# Patient Record
Sex: FEMALE | Race: WHITE | Employment: UNEMPLOYED | URBAN - METROPOLITAN AREA
[De-identification: names, ages, dates, MRNs, and addresses within clinical notes are randomized per-mention and may not be internally consistent; named-entity substitution may affect disease eponyms.]

---

## 2017-08-18 PROBLEM — Z34.83 NORMAL PREGNANCY IN MULTIGRAVIDA IN THIRD TRIMESTER: Status: ACTIVE | Noted: 2017-08-18

## 2021-08-10 ENCOUNTER — HOSPITAL ENCOUNTER (EMERGENCY)
Age: 26
Discharge: OTHER INSTITUTION | End: 2021-08-11
Attending: STUDENT IN AN ORGANIZED HEALTH CARE EDUCATION/TRAINING PROGRAM
Payer: MEDICARE

## 2021-08-10 DIAGNOSIS — R45.851 SUICIDAL IDEATION: ICD-10-CM

## 2021-08-10 DIAGNOSIS — N39.0 URINARY TRACT INFECTION WITHOUT HEMATURIA, SITE UNSPECIFIED: Primary | ICD-10-CM

## 2021-08-10 LAB
ACETAMINOPHEN LEVEL: <5 UG/ML (ref 15–30)
ALBUMIN SERPL-MCNC: 4.5 GM/DL (ref 3.4–5)
ALCOHOL SCREEN SERUM: <0.01 %WT/VOL
ALP BLD-CCNC: 80 IU/L (ref 40–129)
ALT SERPL-CCNC: 17 U/L (ref 10–40)
AMPHETAMINES: NEGATIVE
ANION GAP SERPL CALCULATED.3IONS-SCNC: 9 MMOL/L (ref 4–16)
AST SERPL-CCNC: 12 IU/L (ref 15–37)
BACTERIA: NEGATIVE /HPF
BARBITURATE SCREEN URINE: NEGATIVE
BASOPHILS ABSOLUTE: 0.1 K/CU MM
BASOPHILS RELATIVE PERCENT: 0.5 % (ref 0–1)
BENZODIAZEPINE SCREEN, URINE: NEGATIVE
BILIRUB SERPL-MCNC: 0.2 MG/DL (ref 0–1)
BILIRUBIN URINE: NEGATIVE MG/DL
BLOOD, URINE: NEGATIVE
BUN BLDV-MCNC: 8 MG/DL (ref 6–23)
CALCIUM SERPL-MCNC: 9.1 MG/DL (ref 8.3–10.6)
CANNABINOID SCREEN URINE: NEGATIVE
CHLORIDE BLD-SCNC: 103 MMOL/L (ref 99–110)
CLARITY: ABNORMAL
CO2: 28 MMOL/L (ref 21–32)
COCAINE METABOLITE: NEGATIVE
COLOR: YELLOW
CREAT SERPL-MCNC: 0.6 MG/DL (ref 0.6–1.1)
DIFFERENTIAL TYPE: ABNORMAL
DOSE AMOUNT: ABNORMAL
DOSE AMOUNT: ABNORMAL
DOSE TIME: ABNORMAL
DOSE TIME: ABNORMAL
EOSINOPHILS ABSOLUTE: 0.3 K/CU MM
EOSINOPHILS RELATIVE PERCENT: 3.5 % (ref 0–3)
GFR AFRICAN AMERICAN: >60 ML/MIN/1.73M2
GFR NON-AFRICAN AMERICAN: >60 ML/MIN/1.73M2
GLUCOSE BLD-MCNC: 87 MG/DL (ref 70–99)
GLUCOSE, URINE: NEGATIVE MG/DL
HCG QUALITATIVE: NEGATIVE
HCT VFR BLD CALC: 41.6 % (ref 37–47)
HEMOGLOBIN: 13.3 GM/DL (ref 12.5–16)
IMMATURE NEUTROPHIL %: 0.2 % (ref 0–0.43)
KETONES, URINE: NEGATIVE MG/DL
LEUKOCYTE ESTERASE, URINE: ABNORMAL
LYMPHOCYTES ABSOLUTE: 2.3 K/CU MM
LYMPHOCYTES RELATIVE PERCENT: 24.9 % (ref 24–44)
MCH RBC QN AUTO: 31.1 PG (ref 27–31)
MCHC RBC AUTO-ENTMCNC: 32 % (ref 32–36)
MCV RBC AUTO: 97.2 FL (ref 78–100)
MONOCYTES ABSOLUTE: 0.7 K/CU MM
MONOCYTES RELATIVE PERCENT: 7.3 % (ref 0–4)
MUCUS: ABNORMAL HPF
NITRITE URINE, QUANTITATIVE: NEGATIVE
NUCLEATED RBC %: 0 %
OPIATES, URINE: NEGATIVE
OXYCODONE: NEGATIVE
PDW BLD-RTO: 13 % (ref 11.7–14.9)
PH, URINE: 7 (ref 5–8)
PHENCYCLIDINE, URINE: NEGATIVE
PLATELET # BLD: 256 K/CU MM (ref 140–440)
PMV BLD AUTO: 10.6 FL (ref 7.5–11.1)
POTASSIUM SERPL-SCNC: 4.2 MMOL/L (ref 3.5–5.1)
PROTEIN UA: NEGATIVE MG/DL
RBC # BLD: 4.28 M/CU MM (ref 4.2–5.4)
RBC URINE: 9 /HPF (ref 0–6)
SALICYLATE LEVEL: <0.3 MG/DL (ref 15–30)
SARS-COV-2, NAAT: NOT DETECTED
SEGMENTED NEUTROPHILS ABSOLUTE COUNT: 5.9 K/CU MM
SEGMENTED NEUTROPHILS RELATIVE PERCENT: 63.6 % (ref 36–66)
SODIUM BLD-SCNC: 140 MMOL/L (ref 135–145)
SOURCE: NORMAL
SPECIFIC GRAVITY UA: 1.02 (ref 1–1.03)
SQUAMOUS EPITHELIAL: 10 /HPF
TOTAL IMMATURE NEUTOROPHIL: 0.02 K/CU MM
TOTAL NUCLEATED RBC: 0 K/CU MM
TOTAL PROTEIN: 7.3 GM/DL (ref 6.4–8.2)
TRICHOMONAS: ABNORMAL /HPF
UROBILINOGEN, URINE: 2 MG/DL (ref 0.2–1)
WBC # BLD: 9.3 K/CU MM (ref 4–10.5)
WBC UA: 23 /HPF (ref 0–5)
YEAST: ABNORMAL /HPF

## 2021-08-10 PROCEDURE — 80053 COMPREHEN METABOLIC PANEL: CPT

## 2021-08-10 PROCEDURE — 6370000000 HC RX 637 (ALT 250 FOR IP): Performed by: STUDENT IN AN ORGANIZED HEALTH CARE EDUCATION/TRAINING PROGRAM

## 2021-08-10 PROCEDURE — 84703 CHORIONIC GONADOTROPIN ASSAY: CPT

## 2021-08-10 PROCEDURE — 99285 EMERGENCY DEPT VISIT HI MDM: CPT

## 2021-08-10 PROCEDURE — 84443 ASSAY THYROID STIM HORMONE: CPT

## 2021-08-10 PROCEDURE — 85025 COMPLETE CBC W/AUTO DIFF WBC: CPT

## 2021-08-10 PROCEDURE — 87635 SARS-COV-2 COVID-19 AMP PRB: CPT

## 2021-08-10 PROCEDURE — 81001 URINALYSIS AUTO W/SCOPE: CPT

## 2021-08-10 PROCEDURE — 83721 ASSAY OF BLOOD LIPOPROTEIN: CPT

## 2021-08-10 PROCEDURE — 80061 LIPID PANEL: CPT

## 2021-08-10 PROCEDURE — 80307 DRUG TEST PRSMV CHEM ANLYZR: CPT

## 2021-08-10 PROCEDURE — G0480 DRUG TEST DEF 1-7 CLASSES: HCPCS

## 2021-08-10 PROCEDURE — 6370000000 HC RX 637 (ALT 250 FOR IP): Performed by: PHYSICIAN ASSISTANT

## 2021-08-10 RX ORDER — NICOTINE 21 MG/24HR
1 PATCH, TRANSDERMAL 24 HOURS TRANSDERMAL DAILY
Status: DISCONTINUED | OUTPATIENT
Start: 2021-08-10 | End: 2021-08-11 | Stop reason: HOSPADM

## 2021-08-10 RX ORDER — HYDROXYZINE PAMOATE 25 MG/1
25 CAPSULE ORAL ONCE
Status: COMPLETED | OUTPATIENT
Start: 2021-08-10 | End: 2021-08-10

## 2021-08-10 RX ORDER — NITROFURANTOIN 25; 75 MG/1; MG/1
100 CAPSULE ORAL ONCE
Status: COMPLETED | OUTPATIENT
Start: 2021-08-10 | End: 2021-08-10

## 2021-08-10 RX ADMIN — NITROFURANTOIN MONOHYDRATE/MACROCRYSTALLINE 100 MG: 25; 75 CAPSULE ORAL at 17:00

## 2021-08-10 RX ADMIN — HYDROXYZINE PAMOATE 25 MG: 25 CAPSULE ORAL at 21:59

## 2021-08-10 NOTE — ED PROVIDER NOTES
Patient Identification  Roberta Mccray is a 31 y.o. female    Chief Complaint  Mental Health Problem (SI)      HPI  (History provided by patient)  This is a 31 y.o. female who was brought in by sister for chief complaint of suicidal ideation. Patient reports that she \"broke up promised to a friend\" and is very upset about this. Does not elaborate. States that she \"does not want to be here anymore and wishes to end it all\". She notes that last night with her sister she went outside and wrapped a rope around her neck and was unsuccessful at trying to hang herself. She denies loss of consciousness. She notes multiple suicide attempts in the past.  She is supposed to be on multiple psychiatric medications but has not been taking them. Also notes history of drug abuse but states that she has not used any drugs in some time. Did drink alcohol last night. Also notes some dysuria. She is concerned it may be related to her her herpes infection. REVIEW OF SYSTEMS    Constitutional:  Denies fever, chills  HENT:  Denies sore throat or ear pain   Eyes: Denies vision changes, eye pain  Cardiovascular:  Denies chest pain, syncope  Respiratory:  Denies shortness of breath, cough   GI:  Denies abdominal pain, nausea, vomiting  :  Denies discharge  Musculoskeletal:  Denies back pain, joint pain  Skin:  Denies rash, pruritis  Neurologic:  Denies headache, focal weakness, or sensory changes     See HPI and nursing notes for additional information     I have reviewed the following nursing documentation:  Allergies: Allergies   Allergen Reactions    Latex Anaphylaxis    Bee Venom Anaphylaxis    Pcn [Penicillins] Hives    Amoxil [Amoxicillin] Rash    Sulfa Antibiotics Rash       Past medical history:  has a past medical history of Asthma. Past surgical history:  has no past surgical history on file. Home medications:   Prior to Admission medications    Medication Sig Start Date End Date Taking? Authorizing Provider   ibuprofen (ADVIL;MOTRIN) 800 MG tablet Take 1 tablet by mouth every 8 hours as needed for Pain or Fever 8/21/17   Christian Duque DO   calcium carbonate (TUMS) 500 MG chewable tablet Take 1 tablet by mouth daily    Historical Provider, MD   Prenatal MV-Min-Fe Fum-FA-DHA (PRENATAL 1 PO) Take by mouth    Historical Provider, MD       Social history:  reports that she has been smoking cigarettes. She has been smoking about 0.50 packs per day. She has never used smokeless tobacco. She reports current drug use. Drug: Methamphetamines. She reports that she does not drink alcohol. Family history:    Family History   Problem Relation Age of Onset    Asthma Sister     Diabetes Maternal Grandmother     Heart Disease Maternal Grandmother          Exam  BP (!) 136/94   Pulse 112   Temp 98.4 °F (36.9 °C) (Oral)   Resp 16   Ht 5' 2\" (1.575 m)   Wt 144 lb 4 oz (65.4 kg)   SpO2 100%   BMI 26.38 kg/m²   Nursing note and vitals reviewed. Constitutional: Well developed, well nourished. No acute distress. HENT:      Head: Normocephalic and atraumatic. Ears: External ears normal.      Nose: Nose normal.     Mouth: Membrane mucosa moist and pink. No posterior oropharynx erythema or tonsillar edema  Eyes: Anicteric sclera. No discharge, PERRL  Neck: Supple. Trachea midline. Cardiovascular: RRR, no murmurs, rubs, or gallops, radial pulses 2+ bilaterally. Pulmonary/Chest: Effort normal. No respiratory distress. CTAB. No stridor. No wheezes. No rales. Abdominal: Soft. Nontender to palpation. No distension. No guarding, rebound tenderness, or evidence of ascites. : No CVA tenderness. Musculoskeletal: Moves all extremities. No gross deformity. Neurological: Alert and oriented to person, place, and time. Normal muscle tone. Skin: Warm and dry. No rash.   Psychiatric: Labile mood, pressured speech, actively suicidal.      Radiographs (if obtained):  [] The following radiograph was interpreted by myself in the absence of a radiologist:   [x] Radiologist's Report Reviewed:  No orders to display          Labs  Results for orders placed or performed during the hospital encounter of 08/10/21   COVID-19, Rapid    Specimen: Nasopharyngeal   Result Value Ref Range    Source THROAT     SARS-CoV-2, NAAT NOT DETECTED NOT DETECTED   CBC auto diff   Result Value Ref Range    WBC 9.3 4.0 - 10.5 K/CU MM    RBC 4.28 4.2 - 5.4 M/CU MM    Hemoglobin 13.3 12.5 - 16.0 GM/DL    Hematocrit 41.6 37 - 47 %    MCV 97.2 78 - 100 FL    MCH 31.1 (H) 27 - 31 PG    MCHC 32.0 32.0 - 36.0 %    RDW 13.0 11.7 - 14.9 %    Platelets 640 319 - 943 K/CU MM    MPV 10.6 7.5 - 11.1 FL    Differential Type AUTOMATED DIFFERENTIAL     Segs Relative 63.6 36 - 66 %    Lymphocytes % 24.9 24 - 44 %    Monocytes % 7.3 (H) 0 - 4 %    Eosinophils % 3.5 (H) 0 - 3 %    Basophils % 0.5 0 - 1 %    Segs Absolute 5.9 K/CU MM    Lymphocytes Absolute 2.3 K/CU MM    Monocytes Absolute 0.7 K/CU MM    Eosinophils Absolute 0.3 K/CU MM    Basophils Absolute 0.1 K/CU MM    Nucleated RBC % 0.0 %    Total Nucleated RBC 0.0 K/CU MM    Total Immature Neutrophil 0.02 K/CU MM    Immature Neutrophil % 0.2 0 - 0.43 %   CMP   Result Value Ref Range    Sodium 140 135 - 145 MMOL/L    Potassium 4.2 3.5 - 5.1 MMOL/L    Chloride 103 99 - 110 mMol/L    CO2 28 21 - 32 MMOL/L    BUN 8 6 - 23 MG/DL    CREATININE 0.6 0.6 - 1.1 MG/DL    Glucose 87 70 - 99 MG/DL    Calcium 9.1 8.3 - 10.6 MG/DL    Albumin 4.5 3.4 - 5.0 GM/DL    Total Protein 7.3 6.4 - 8.2 GM/DL    Total Bilirubin 0.2 0.0 - 1.0 MG/DL    ALT 17 10 - 40 U/L    AST 12 (L) 15 - 37 IU/L    Alkaline Phosphatase 80 40 - 129 IU/L    GFR Non-African American >60 >60 mL/min/1.73m2    GFR African American >60 >60 mL/min/1.73m2    Anion Gap 9 4 - 16   Salicylate   Result Value Ref Range    Salicylate Lvl <1.5 (L) 15 - 30 MG/DL    DOSE AMOUNT DOSE AMT.  GIVEN - UNKNOWN     DOSE TIME DOSE TIME GIVEN - UNKNOWN Acetaminophen Level   Result Value Ref Range    Acetaminophen Level <5.0 (L) 15 - 30 ug/ml    DOSE AMOUNT DOSE AMT. GIVEN - UNKNOWN     DOSE TIME DOSE TIME GIVEN - UNKNOWN    HCG Serum, Qualitative   Result Value Ref Range    hCG Qual NEGATIVE    Urinalysis (Lab)   Result Value Ref Range    Color, UA YELLOW YELLOW    Clarity, UA HAZY (A) CLEAR    Glucose, Urine NEGATIVE NEGATIVE MG/DL    Bilirubin Urine NEGATIVE NEGATIVE MG/DL    Ketones, Urine NEGATIVE NEGATIVE MG/DL    Specific Gravity, UA 1.016 1.001 - 1.035    Blood, Urine NEGATIVE NEGATIVE    pH, Urine 7.0 5.0 - 8.0    Protein, UA NEGATIVE NEGATIVE MG/DL    Urobilinogen, Urine 2.0 (H) 0.2 - 1.0 MG/DL    Nitrite Urine, Quantitative NEGATIVE NEGATIVE    Leukocyte Esterase, Urine LARGE (A) NEGATIVE    RBC, UA 9 (H) 0 - 6 /HPF    WBC, UA 23 (H) 0 - 5 /HPF    Bacteria, UA NEGATIVE NEGATIVE /HPF    Yeast, UA OCCASIONAL /HPF    Squam Epithel, UA 10 /HPF    Mucus, UA RARE (A) NEGATIVE HPF    Trichomonas, UA NONE SEEN NONE SEEN /HPF   Urine Drug Screen   Result Value Ref Range    Cannabinoid Scrn, Ur NEGATIVE NEGATIVE    Amphetamines NEGATIVE NEGATIVE    Cocaine Metabolite NEGATIVE NEGATIVE    Benzodiazepine Screen, Urine NEGATIVE NEGATIVE    Barbiturate Screen, Ur NEGATIVE NEGATIVE    Opiates, Urine NEGATIVE NEGATIVE    Phencyclidine, Urine NEGATIVE NEGATIVE    Oxycodone NEGATIVE NEGATIVE   Ethanol   Result Value Ref Range    Alcohol Scrn <0.01 <0.01 %WT/VOL         MDM  Patient presents for suicidal ideation. Also notes some dysuria. Considered medically cleared, started on Macrobid for UTI. Patient seen by mental health, plan is for placement given her active suicidal ideation. This patient was also seen and evaluated by Dr. Elizabeth Guthrie    Final Impression  1. Urinary tract infection without hematuria, site unspecified    2. Suicidal ideation        Blood pressure (!) 136/94, pulse 112, temperature 98.4 °F (36.9 °C), temperature source Oral, resp.  rate 16, height 5' 2\" (1.575 m), weight 144 lb 4 oz (65.4 kg), SpO2 100 %, unknown if currently breastfeeding. Patient was given scripts for the following medications. I counseled patient how to take these medications. New Prescriptions    No medications on file       This chart was generated using the [address] [place] [address] dictation system. I created this record but it may contain dictation errors given the limitations of this technology.        [address] [place], [place]-  08/10/21 0695

## 2021-08-10 NOTE — ED NOTES
The patient is talking to herself and explaining who \"Naldo\" is and how he is related to each person she knows.       Melody Younger  08/10/21 3802

## 2021-08-10 NOTE — ED NOTES
Patient is here for SI. Patient is calm at this time. Patient is changed into green gown and belongings have been taken. Patient has had blood drawn and urine taken. Patient has sitter at bedside and is waiting to be seen by Mental health RN. Will continue to monitor.       Lissette Gimenez RN  08/10/21 2390

## 2021-08-10 NOTE — ED NOTES
Pt states she would like to leave. This tech told the patient that she is pink slipped and cannot leave. The patient is agitated but is not attempting to get out of bed at this time.  The patient denies any needs     Monique Long  08/10/21 6996

## 2021-08-10 NOTE — ED NOTES
Patient is here for SI. Patient is calm at this time. Patient is changed into green gown and belongings have been taken. Patient has had blood drawn and urine taken. Patient has sitter at bedside and is waiting to be seen by Mental health RN. Will continue to monitor.       Mirlande Galo RN  08/10/21 1930

## 2021-08-11 VITALS
DIASTOLIC BLOOD PRESSURE: 90 MMHG | RESPIRATION RATE: 18 BRPM | HEIGHT: 62 IN | BODY MASS INDEX: 26.54 KG/M2 | HEART RATE: 94 BPM | OXYGEN SATURATION: 100 % | WEIGHT: 144.25 LBS | SYSTOLIC BLOOD PRESSURE: 112 MMHG | TEMPERATURE: 98.4 F

## 2021-08-11 NOTE — ED PROVIDER NOTES
10:52 PM EDT  Key Howierosia was checked out to me by Dr. Jennie Burnett for [facility] [address] placement . Please see his/her initial documentation for details of the patient's ED presentation, physical exam and completed studies. Covid is negative. CBC is within normal limits. CMP is within normal limits. Lites and Tylenol negative. Pregnancy negative. Urinalysis does show some white cells but does have squamous. Drug screen is negative. Alcohol level is negative. Patient has been medically cleared. Has been seen by psych. They are seeking inpatient placement. 1:17am-accepted at [facility] mental health, transport setup.         Michael Krishnan MD  08/11/21 5848

## 2021-08-11 NOTE — ED NOTES
Pt on [address] [place] [address] and transferred put with crew at this time. Belongings returned and given to [facility] crew.       Radha Doherty RN  08/11/21 6806

## 2021-08-11 NOTE — ED PROVIDER NOTES
10:54 PM EDT  Angelita Coley was checked out to me by Dr. Jaun Roger for [address] placement. Please see his/her initial documentation for details of the patient's ED presentation, physical exam and completed studies.     Patient accepted at [facility] and being transferred          Radhika Jones MD  08/11/21 2307

## 2021-08-11 NOTE — ED NOTES
..Provisional Diagnosis:     Suicidal with attempt  Hx of Bipolar per pt  Disturbed sleep pattern  Grief and loss issues    Psychosocial and Contextual Factors:       Per MACKENZIE Leiva PA-C  \"This is a 31 y.o. female who was brought in by sister for chief complaint of suicidal ideation. Patient reports that she \"broke up promised to a friend\" and is very upset about this. Does not elaborate. States that she \"does not want to be here anymore and wishes to end it all\". She notes that last night with her sister she went outside and wrapped a rope around her neck and was unsuccessful at trying to hang herself. She denies loss of consciousness. She notes multiple suicide attempts in the past.  She is supposed to be on multiple psychiatric medications but has not been taking them. Also notes history of drug abuse but states that she has not used any drugs in some time. Did drink alcohol last night. Also notes some dysuria. She is concerned it may be related to her her herpes infection. \"    Pt presents tearful, psychomotor agitation, pacing, difficult time with focus, depressed    Pt reported SI denies plan but noted that pt attempted by hanging prior to arrival to ED, pt stated she just wants to \"go to sleep and not wake up\", reports constant thoughts of death, pt stated she is haunted by guilt, reported that she has dealt with depression for years and was on medication for bipolar but has not been on medication for unknown amt of time, pt stated that she has made so many bad decisions in her life, stated that 2 years ago she and her cousin were living together and doing drugs, stated they had got into argument and later that night cousin  of overdose, stated she has never forgiven herself and feels \"guilty\", stated the last thing she said to her cousin was in a text arguing,     Pt denies HI intent or plan    Pt denies AVH    Pt denies issues with appetite    Pt stated poor sleep, stated she has racing thoughts,     Pt stated she has been clean from drug use for 2 months, stated the guilt of everything her choices have caused is overwhelming, stated she lost custody of all 3 of her children, stated she needs to get her life back together and that means she needs to get back on her medications, stated her mood cycles and if she doesn't get it under control she feels she will try to harm herself again    Pt demonstrates fair insight, poor judgement    Pt unable to assure safety of self, high risk to harm self    Discussed all above with MACKENZIE Leiva PA-C who recommends psychiatric care for observation, evaluation and safety    Will seek placement     C-SSRS Summary:      Patient:as above  Family: no hx shared  Agency: none      Present Suicidal Behavior:      Verbal: as above    Attempt: as above    Past Suicidal Behavior:     Verbal:hx of per pt    Attempt:hx of per pt      Self-Injurious/Self-Mutilation:unknown    Trauma Identified:  unknown      Protective Factors:    None identified at this time    Risk Factors:    Suicidal with attempt  Hx of Bipolar per pt  Disturbed sleep pattern  Grief and loss issues    Clinical Summary:    As above   Will seek placement     Electronically signed by Marianne Melendez RN on 8/10/2021 at 8:08 PM      Marianne Melendez RN  08/10/21 2029

## 2021-08-11 NOTE — ED NOTES
Spoke to [address] [place] [address] Dr GARCIA stated beds available, chart faxed for review      Lisa Abdalla RN  08/10/21 2036

## 2021-08-11 NOTE — ED PROVIDER NOTES
Emergency Department Encounter    Patient: Ana Jordan  MRN: [id number]  : 1995  Date of Evaluation: 8/10/2021  ED Provider:  Iona Hayes MD    Triage Chief Complaint:   Mental Health Problem (SI)    [place]:  Ana Jordan is a 31 y.o. female presenting with complaint of suicidal ideation. Patient states she has been having suicidal thoughts and both patient and sister at bedside believe patient would benefit from inpatient psychiatric care. To the PA patient states she \" does not want to be here anymore and wishes to end it all\". Per report patient briefly wrapped a cord around her neck saying she was going to hang herself. Patient states she did not hang herself from anything denies any pain over her neck. Patient states she has used drugs previously but has not used in several weeks. Patient drink alcohol last night. Patient also does describe some mild dysuria. ROS - see HPI, below listed is current ROS at time of my eval:  General:  No fevers, no chills, no weakness  Eyes:  No recent vison changes, no discharge  ENT:  No sore throat, no nasal congestion, no hearing changes  Cardiovascular:  No chest pain, no palpitations  Respiratory:  No shortness of breath, no cough, no wheezing  Gastrointestinal:  No pain, no nausea, no vomiting, no diarrhea  Musculoskeletal:  No muscle pain, no joint pain  Skin:  No rash, no pruritis, no easy bruising  Neurologic:  No speech problems, no headache, no extremity numbness, no extremity tingling, no extremity weakness  Psychiatric:  + depression/SI  Genitourinary:  + dysuria, no hematuria  Endocrine:  No unexpected weight gain, no unexpected weight loss  Extremities:  no edema, no pain    Past Medical History:   Diagnosis Date    Asthma      History reviewed. No pertinent surgical history.   Family History   Problem Relation Age of Onset    Asthma Sister     Diabetes Maternal Grandmother     Heart Disease Maternal Grandmother      Social History     Socioeconomic History    Marital status: Single     Spouse name: Not on file    Number of children: Not on file    Years of education: Not on file    Highest education level: Not on file   Occupational History    Not on file   Tobacco Use    Smoking status: Current Every Day Smoker     Packs/day: 0.50     Types: Cigarettes    Smokeless tobacco: Never Used   Substance and Sexual Activity    Alcohol use: No    Drug use: Yes     Types: Methamphetamines    Sexual activity: Yes     Partners: Male   Other Topics Concern    Not on file   Social History Narrative    Not on file     Social Determinants of Health     Financial Resource Strain:     Difficulty of Paying Living Expenses:    Food Insecurity:     Worried About Running Out of Food in the Last Year:     [address] in the Last Year:    Transportation Needs:     Lack of Transportation (Medical):      Lack of Transportation (Non-Medical):    Physical Activity:     Days of Exercise per Week:     Minutes of Exercise per Session:    Stress:     Feeling of Stress :    Social Connections:     Frequency of Communication with Friends and Family:     Frequency of Social Gatherings with Friends and Family:     Attends [employer] Services:     Active Member of Clubs or Organizations:     Attends Club or Organization Meetings:     Marital Status:    Intimate Partner Violence:     Fear of Current or Ex-Partner:     Emotionally Abused:     Physically Abused:     Sexually Abused:      Current Facility-Administered Medications   Medication Dose Route Frequency Provider Last Rate Last Admin    nicotine (NICODERM CQ) 21 MG/24HR 1 patch  1 patch Transdermal Daily Tamra Alonso MD   1 patch at 08/10/21 1957     Current Outpatient Medications   Medication Sig Dispense Refill    ibuprofen (ADVIL;MOTRIN) 800 MG tablet Take 1 tablet by mouth every 8 hours as needed for Pain or Fever 30 tablet 2    calcium carbonate (TUMS) 500 MG chewable 4.2 - 5.4 M/CU MM    Hemoglobin 13.3 12.5 - 16.0 GM/DL    Hematocrit 41.6 37 - 47 %    MCV 97.2 78 - 100 FL    MCH 31.1 (H) 27 - 31 PG    MCHC 32.0 32.0 - 36.0 %    RDW 13.0 11.7 - 14.9 %    Platelets 595 471 - 481 K/CU MM    MPV 10.6 7.5 - 11.1 FL    Differential Type AUTOMATED DIFFERENTIAL     Segs Relative 63.6 36 - 66 %    Lymphocytes % 24.9 24 - 44 %    Monocytes % 7.3 (H) 0 - 4 %    Eosinophils % 3.5 (H) 0 - 3 %    Basophils % 0.5 0 - 1 %    Segs Absolute 5.9 K/CU MM    Lymphocytes Absolute 2.3 K/CU MM    Monocytes Absolute 0.7 K/CU MM    Eosinophils Absolute 0.3 K/CU MM    Basophils Absolute 0.1 K/CU MM    Nucleated RBC % 0.0 %    Total Nucleated RBC 0.0 K/CU MM    Total Immature Neutrophil 0.02 K/CU MM    Immature Neutrophil % 0.2 0 - 0.43 %   CMP   Result Value Ref Range    Sodium 140 135 - 145 MMOL/L    Potassium 4.2 3.5 - 5.1 MMOL/L    Chloride 103 99 - 110 mMol/L    CO2 28 21 - 32 MMOL/L    BUN 8 6 - 23 MG/DL    CREATININE 0.6 0.6 - 1.1 MG/DL    Glucose 87 70 - 99 MG/DL    Calcium 9.1 8.3 - 10.6 MG/DL    Albumin 4.5 3.4 - 5.0 GM/DL    Total Protein 7.3 6.4 - 8.2 GM/DL    Total Bilirubin 0.2 0.0 - 1.0 MG/DL    ALT 17 10 - 40 U/L    AST 12 (L) 15 - 37 IU/L    Alkaline Phosphatase 80 40 - 129 IU/L    GFR Non-African American >60 >60 mL/min/1.73m2    GFR African American >60 >60 mL/min/1.73m2    Anion Gap 9 4 - 16   Salicylate   Result Value Ref Range    Salicylate Lvl <3.0 (L) 15 - 30 MG/DL    DOSE AMOUNT DOSE AMT. GIVEN - UNKNOWN     DOSE TIME DOSE TIME GIVEN - UNKNOWN    Acetaminophen Level   Result Value Ref Range    Acetaminophen Level <5.0 (L) 15 - 30 ug/ml    DOSE AMOUNT DOSE AMT.  GIVEN - UNKNOWN     DOSE TIME DOSE TIME GIVEN - UNKNOWN    HCG Serum, Qualitative   Result Value Ref Range    hCG Qual NEGATIVE    Urinalysis (Lab)   Result Value Ref Range    Color, UA YELLOW YELLOW    Clarity, UA HAZY (A) CLEAR    Glucose, Urine NEGATIVE NEGATIVE MG/DL    Bilirubin Urine NEGATIVE NEGATIVE MG/DL    Ketones, Urine NEGATIVE NEGATIVE MG/DL    Specific Gravity, UA 1.016 1.001 - 1.035    Blood, Urine NEGATIVE NEGATIVE    pH, Urine 7.0 5.0 - 8.0    Protein, UA NEGATIVE NEGATIVE MG/DL    Urobilinogen, Urine 2.0 (H) 0.2 - 1.0 MG/DL    Nitrite Urine, Quantitative NEGATIVE NEGATIVE    Leukocyte Esterase, Urine LARGE (A) NEGATIVE    RBC, UA 9 (H) 0 - 6 /HPF    WBC, UA 23 (H) 0 - 5 /HPF    Bacteria, UA NEGATIVE NEGATIVE /HPF    Yeast, UA OCCASIONAL /HPF    Squam Epithel, UA 10 /HPF    Mucus, UA RARE (A) NEGATIVE HPF    Trichomonas, UA NONE SEEN NONE SEEN /HPF   Urine Drug Screen   Result Value Ref Range    Cannabinoid Scrn, Ur NEGATIVE NEGATIVE    Amphetamines NEGATIVE NEGATIVE    Cocaine Metabolite NEGATIVE NEGATIVE    Benzodiazepine Screen, Urine NEGATIVE NEGATIVE    Barbiturate Screen, Ur NEGATIVE NEGATIVE    Opiates, Urine NEGATIVE NEGATIVE    Phencyclidine, Urine NEGATIVE NEGATIVE    Oxycodone NEGATIVE NEGATIVE   Ethanol   Result Value Ref Range    Alcohol Scrn <0.01 <0.01 %WT/VOL      Radiographs (if obtained):  Radiologist's Report Reviewed:  No results found. MDM:    50-year-old female presenting with suicidal ideation. History can be seen above. Vitals on presentation patient is mildly tachycardic at 112 but otherwise vitals are reassuring and patient afebrile satting well on room air. Physical exam is grossly unremarkable and patient states she did wrap a cord around her neck but states she did not hang herself or pull hard and there is no ligature marks, ecchymosis or pain with palpation along the neck. Overall other than depressed mood patient exam is reassuring. Psychiatric laboratory evaluation was obtained. CBC, CMP are reassuring. UDS is negative. UA shows elevated whites. With patient's dysuria will treat with Macrobid. Ethanol, salicylates, Tylenol levels were negative. Serum hCG is negative. Rapid Covid is negative. Patient is medically clear at this time.   Behavioral health saw patient believe patient would benefit from inpatient psychiatric evaluation and treatment. Currently pending placement. Patient was signed out to oncoming physician. Please see their note for final disposition. Clinical Impression:  1. Urinary tract infection without hematuria, site unspecified    2. Suicidal ideation          Comment: Please note this report has been produced using speech recognition software and may contain errors related to that system including errors in grammar, punctuation, and spelling, as well as words and phrases that may be inappropriate. Efforts were made to edit the dictations.         Ninoska Malone MD  08/11/21 0004

## 2021-08-11 NOTE — ED NOTES
Pt accepted, [facility] ETA 0500, nurse to nurse to [employer] RN updated with MAMTA Godwin, RN  08/11/21 8888

## 2021-08-12 LAB
CHOLESTEROL: 143 MG/DL
HDLC SERPL-MCNC: 38 MG/DL
LDL CHOLESTEROL DIRECT: 81 MG/DL
TRIGL SERPL-MCNC: 166 MG/DL
TSH HIGH SENSITIVITY: 0.73 UIU/ML (ref 0.27–4.2)

## 2021-08-17 ENCOUNTER — HOSPITAL ENCOUNTER (OUTPATIENT)
Age: 26
Setting detail: SPECIMEN
Discharge: HOME | End: 2021-08-17

## 2021-08-17 LAB
DOSE AMOUNT: NORMAL
DOSE TIME: NORMAL
VALPROIC ACID LEVEL: 66.2 UG/ML (ref 50–100)

## 2021-08-17 PROCEDURE — 80164 ASSAY DIPROPYLACETIC ACD TOT: CPT

## 2021-08-17 PROCEDURE — 36415 COLL VENOUS BLD VENIPUNCTURE: CPT

## 2023-02-03 ENCOUNTER — HOSPITAL ENCOUNTER (INPATIENT)
Age: 28
LOS: 13 days | Discharge: HOME | DRG: 871 | End: 2023-02-17
Attending: EMERGENCY MEDICINE | Admitting: INTERNAL MEDICINE
Payer: COMMERCIAL

## 2023-02-03 ENCOUNTER — APPOINTMENT (OUTPATIENT)
Dept: CT IMAGING | Age: 28
DRG: 871 | End: 2023-02-03
Payer: COMMERCIAL

## 2023-02-03 ENCOUNTER — APPOINTMENT (OUTPATIENT)
Dept: GENERAL RADIOLOGY | Age: 28
DRG: 871 | End: 2023-02-03
Payer: COMMERCIAL

## 2023-02-03 DIAGNOSIS — A41.9 SEPTICEMIA (HCC): ICD-10-CM

## 2023-02-03 DIAGNOSIS — N10 ACUTE PYELONEPHRITIS: Primary | ICD-10-CM

## 2023-02-03 DIAGNOSIS — I50.21 ACUTE HFREF (HEART FAILURE WITH REDUCED EJECTION FRACTION) (HCC): ICD-10-CM

## 2023-02-03 DIAGNOSIS — J90 BILATERAL PLEURAL EFFUSION: ICD-10-CM

## 2023-02-03 LAB
A/G RATIO: 1.3 (ref 1.1–2.2)
ALBUMIN SERPL-MCNC: 3.8 G/DL (ref 3.4–5)
ALP BLD-CCNC: 128 U/L (ref 40–129)
ALT SERPL-CCNC: 107 U/L (ref 10–40)
AMPHETAMINE SCREEN, URINE: POSITIVE
ANION GAP SERPL CALCULATED.3IONS-SCNC: 12 MMOL/L (ref 3–16)
AST SERPL-CCNC: 55 U/L (ref 15–37)
BACTERIA: ABNORMAL /HPF
BARBITURATE SCREEN URINE: ABNORMAL
BASOPHILS ABSOLUTE: 0.1 K/UL (ref 0–0.2)
BASOPHILS RELATIVE PERCENT: 0.6 %
BENZODIAZEPINE SCREEN, URINE: ABNORMAL
BILIRUB SERPL-MCNC: 0.9 MG/DL (ref 0–1)
BILIRUBIN URINE: NEGATIVE
BLOOD, URINE: ABNORMAL
BUN BLDV-MCNC: 15 MG/DL (ref 7–20)
CALCIUM SERPL-MCNC: 9 MG/DL (ref 8.3–10.6)
CANNABINOID SCREEN URINE: ABNORMAL
CHLORIDE BLD-SCNC: 102 MMOL/L (ref 99–110)
CLARITY: ABNORMAL
CO2: 24 MMOL/L (ref 21–32)
COCAINE METABOLITE SCREEN URINE: ABNORMAL
COLOR: YELLOW
CREAT SERPL-MCNC: 0.6 MG/DL (ref 0.6–1.1)
EOSINOPHILS ABSOLUTE: 0.1 K/UL (ref 0–0.6)
EOSINOPHILS RELATIVE PERCENT: 0.5 %
EPITHELIAL CELLS, UA: ABNORMAL /HPF (ref 0–5)
FENTANYL SCREEN, URINE: ABNORMAL
GFR SERPL CREATININE-BSD FRML MDRD: >60 ML/MIN/{1.73_M2}
GLUCOSE BLD-MCNC: 107 MG/DL (ref 70–99)
GLUCOSE URINE: NEGATIVE MG/DL
HCG(URINE) PREGNANCY TEST: NEGATIVE
HCT VFR BLD CALC: 36.4 % (ref 36–48)
HEMOGLOBIN: 11.7 G/DL (ref 12–16)
HYALINE CASTS: ABNORMAL /LPF (ref 0–2)
KETONES, URINE: NEGATIVE MG/DL
LACTIC ACID: 3 MMOL/L (ref 0.4–2)
LACTIC ACID: 3.6 MMOL/L (ref 0.4–2)
LEUKOCYTE ESTERASE, URINE: ABNORMAL
LIPASE: 54 U/L (ref 13–60)
LYMPHOCYTES ABSOLUTE: 4.1 K/UL (ref 1–5.1)
LYMPHOCYTES RELATIVE PERCENT: 34.3 %
Lab: ABNORMAL
MCH RBC QN AUTO: 26.4 PG (ref 26–34)
MCHC RBC AUTO-ENTMCNC: 32.1 G/DL (ref 31–36)
MCV RBC AUTO: 82.4 FL (ref 80–100)
METHADONE SCREEN, URINE: ABNORMAL
MICROSCOPIC EXAMINATION: YES
MONOCYTES ABSOLUTE: 0.8 K/UL (ref 0–1.3)
MONOCYTES RELATIVE PERCENT: 6.6 %
MUCUS: ABNORMAL /LPF
NEUTROPHILS ABSOLUTE: 7 K/UL (ref 1.7–7.7)
NEUTROPHILS RELATIVE PERCENT: 58 %
NITRITE, URINE: POSITIVE
OPIATE SCREEN URINE: ABNORMAL
OXYCODONE URINE: ABNORMAL
PDW BLD-RTO: 17.7 % (ref 12.4–15.4)
PH UA: 5
PH UA: 5 (ref 5–8)
PHENCYCLIDINE SCREEN URINE: ABNORMAL
PLATELET # BLD: 373 K/UL (ref 135–450)
PMV BLD AUTO: 8.7 FL (ref 5–10.5)
POTASSIUM SERPL-SCNC: 4.8 MMOL/L (ref 3.5–5.1)
PROTEIN UA: 100 MG/DL
RAPID INFLUENZA  B AGN: NEGATIVE
RAPID INFLUENZA A AGN: NEGATIVE
RBC # BLD: 4.41 M/UL (ref 4–5.2)
RBC UA: ABNORMAL /HPF (ref 0–4)
SARS-COV-2, NAAT: NOT DETECTED
SODIUM BLD-SCNC: 138 MMOL/L (ref 136–145)
SPECIFIC GRAVITY UA: >=1.03 (ref 1–1.03)
TOTAL PROTEIN: 6.7 G/DL (ref 6.4–8.2)
URINE REFLEX TO CULTURE: YES
URINE TYPE: ABNORMAL
UROBILINOGEN, URINE: 1 E.U./DL
WBC # BLD: 12.1 K/UL (ref 4–11)
WBC UA: ABNORMAL /HPF (ref 0–5)

## 2023-02-03 PROCEDURE — 84703 CHORIONIC GONADOTROPIN ASSAY: CPT

## 2023-02-03 PROCEDURE — 85025 COMPLETE CBC W/AUTO DIFF WBC: CPT

## 2023-02-03 PROCEDURE — 80074 ACUTE HEPATITIS PANEL: CPT

## 2023-02-03 PROCEDURE — 80053 COMPREHEN METABOLIC PANEL: CPT

## 2023-02-03 PROCEDURE — 87635 SARS-COV-2 COVID-19 AMP PRB: CPT

## 2023-02-03 PROCEDURE — 96365 THER/PROPH/DIAG IV INF INIT: CPT

## 2023-02-03 PROCEDURE — 87341 HEP B SURFACE AG NEUTRLZJ IA: CPT

## 2023-02-03 PROCEDURE — 87804 INFLUENZA ASSAY W/OPTIC: CPT

## 2023-02-03 PROCEDURE — 6360000002 HC RX W HCPCS: Performed by: EMERGENCY MEDICINE

## 2023-02-03 PROCEDURE — 96375 TX/PRO/DX INJ NEW DRUG ADDON: CPT

## 2023-02-03 PROCEDURE — 81001 URINALYSIS AUTO W/SCOPE: CPT

## 2023-02-03 PROCEDURE — 80307 DRUG TEST PRSMV CHEM ANLYZR: CPT

## 2023-02-03 PROCEDURE — 87086 URINE CULTURE/COLONY COUNT: CPT

## 2023-02-03 PROCEDURE — 87077 CULTURE AEROBIC IDENTIFY: CPT

## 2023-02-03 PROCEDURE — 87040 BLOOD CULTURE FOR BACTERIA: CPT

## 2023-02-03 PROCEDURE — 83690 ASSAY OF LIPASE: CPT

## 2023-02-03 PROCEDURE — 2580000003 HC RX 258: Performed by: EMERGENCY MEDICINE

## 2023-02-03 PROCEDURE — 83605 ASSAY OF LACTIC ACID: CPT

## 2023-02-03 PROCEDURE — 71046 X-RAY EXAM CHEST 2 VIEWS: CPT

## 2023-02-03 PROCEDURE — 96361 HYDRATE IV INFUSION ADD-ON: CPT

## 2023-02-03 PROCEDURE — 74176 CT ABD & PELVIS W/O CONTRAST: CPT

## 2023-02-03 PROCEDURE — 99285 EMERGENCY DEPT VISIT HI MDM: CPT

## 2023-02-03 RX ORDER — ONDANSETRON 2 MG/ML
4 INJECTION INTRAMUSCULAR; INTRAVENOUS ONCE
Status: COMPLETED | OUTPATIENT
Start: 2023-02-03 | End: 2023-02-03

## 2023-02-03 RX ORDER — SODIUM CHLORIDE 9 MG/ML
1000 INJECTION, SOLUTION INTRAVENOUS CONTINUOUS
Status: DISCONTINUED | OUTPATIENT
Start: 2023-02-04 | End: 2023-02-04

## 2023-02-03 RX ORDER — 0.9 % SODIUM CHLORIDE 0.9 %
1000 INTRAVENOUS SOLUTION INTRAVENOUS ONCE
Status: COMPLETED | OUTPATIENT
Start: 2023-02-03 | End: 2023-02-03

## 2023-02-03 RX ORDER — HYDROCODONE BITARTRATE AND ACETAMINOPHEN 5; 325 MG/1; MG/1
1 TABLET ORAL ONCE
Status: COMPLETED | OUTPATIENT
Start: 2023-02-04 | End: 2023-02-04

## 2023-02-03 RX ORDER — KETOROLAC TROMETHAMINE 30 MG/ML
30 INJECTION, SOLUTION INTRAMUSCULAR; INTRAVENOUS ONCE
Status: COMPLETED | OUTPATIENT
Start: 2023-02-03 | End: 2023-02-03

## 2023-02-03 RX ADMIN — SODIUM CHLORIDE 1000 ML: 9 INJECTION, SOLUTION INTRAVENOUS at 20:08

## 2023-02-03 RX ADMIN — CEFTRIAXONE 1000 MG: 1 INJECTION, POWDER, FOR SOLUTION INTRAMUSCULAR; INTRAVENOUS at 22:52

## 2023-02-03 RX ADMIN — KETOROLAC TROMETHAMINE 30 MG: 30 INJECTION, SOLUTION INTRAMUSCULAR; INTRAVENOUS at 20:09

## 2023-02-03 RX ADMIN — ONDANSETRON 4 MG: 2 INJECTION INTRAMUSCULAR; INTRAVENOUS at 20:09

## 2023-02-03 RX ADMIN — SODIUM CHLORIDE 1000 ML: 9 INJECTION, SOLUTION INTRAVENOUS at 21:52

## 2023-02-03 ASSESSMENT — PAIN SCALES - GENERAL
PAINLEVEL_OUTOF10: 5
PAINLEVEL_OUTOF10: 5

## 2023-02-03 ASSESSMENT — PAIN DESCRIPTION - LOCATION
LOCATION: ABDOMEN;BACK
LOCATION: ABDOMEN;BACK

## 2023-02-03 ASSESSMENT — PAIN - FUNCTIONAL ASSESSMENT: PAIN_FUNCTIONAL_ASSESSMENT: 0-10

## 2023-02-03 ASSESSMENT — PAIN DESCRIPTION - DESCRIPTORS: DESCRIPTORS: SHOOTING;STABBING

## 2023-02-03 NOTE — ED PROVIDER NOTES
[facility] EMERGENCY DEPT VISIT      Patient Identification  Karen Taylor is a 28 y.o. female. Chief Complaint   Abdominal Pain and Back Pain      History of Present Illness:    History was obtained from patient. This is a  28 y.o. female who presents ambulatory  to the ED with complaints of one week h/o chills, sinus congestion, cough, shortness of breath, abdominal pain and low back pain. Patient developed nausea and vomiting today. No diarrhea. She denies any chest pain. She is not sure if she has been running fevers. She has been living in a tent outside for the last month. She denies dysuria, frequency, urgency, hematuria. No headache or neck stiffness. She has taken no medications for symptoms. She denies IV drug use. . she has been taking no medications for her symptoms.      Past Medical History:   Diagnosis Date    Asthma     Bipolar 1 disorder ([address]     Hypertension        Past Surgical History:   Procedure Laterality Date     SECTION           Current Facility-Administered Medications:     0.9 % sodium chloride infusion, 1,000 mL, IntraVENous, Continuous, Rocio Joe MD    Current Outpatient Medications:     ibuprofen (ADVIL;MOTRIN) 800 MG tablet, Take 1 tablet by mouth every 8 hours as needed for Pain or Fever, Disp: 30 tablet, Rfl: 2    calcium carbonate (TUMS) 500 MG chewable tablet, Take 1 tablet by mouth daily, Disp: , Rfl:     Prenatal MV-Min-Fe Fum-FA-DHA (PRENATAL 1 PO), Take by mouth, Disp: , Rfl:     Allergies   Allergen Reactions    Latex Anaphylaxis    Bee Venom Anaphylaxis    Pcn [Penicillins] Hives    Amoxil [Amoxicillin] Rash    Sulfa Antibiotics Rash       Social History     Socioeconomic History    Marital status: Single     Spouse name: Not on file    Number of children: Not on file    Years of education: Not on file    Highest education level: Not on file   Occupational History    Not on file   Tobacco Use    Smoking status: Every Day     Packs/day: 0.50 Types: Cigarettes    Smokeless tobacco: Never   Substance and Sexual Activity    Alcohol use: No    Drug use: Yes     Types: Methamphetamines (Crystal Meth)    Sexual activity: Yes     Partners: Male   Other Topics Concern    Not on file   Social History Narrative    Not on file     Social Determinants of Health     Financial Resource Strain: Not on file   Food Insecurity: Not on file   Transportation Needs: Not on file   Physical Activity: Not on file   Stress: Not on file   Social Connections: Not on file   Intimate Partner Violence: Not on file   Housing Stability: Not on file       Nursing Notes Reviewed        PHYSICAL EXAM:  GENERAL APPEARANCE: Johanna Curry is in no acute respiratory distress. Awake and alert. VITAL SIGNS:   ED Triage Vitals [02/03/23 1847]   Enc Vitals Group      BP (!) 144/110      Heart Rate (!) 117      Resp 20      Temp 97.2 °F (36.2 °C)      Temp Source Axillary      SpO2 100 %      Weight 130 lb (59 kg)      Height 5' 3\" (1.6 m)      Head Circumference       Peak Flow       Pain Score       Pain Loc       Pain Edu? Excl. in [address]? HEAD: Normocephalic, atraumatic. EYES:  Extraocular muscles are intact. Conjunctivas are pink. Negative scleral icterus. ENT:  Mucous membranes are moist.  Pharynx without erythema or exudates. NECK: Nontender and supple. CHEST: Clear to auscultation bilaterally. No rales, rhonchi, or wheezing. HEART:  tachycardic rate and rhythm. No murmurs. Strong and equal pulses in the upper and lower extremities. ABDOMEN: Soft,  nondistended, positive bowel sounds. abdomen is diffusely tender. No guarding. bilateral flank tenderness  MUSCULOSKELETAL:  Active range of motion of the upper and lower extremities. No edema. NEUROLOGICAL: Awake, alert and oriented x 3. Power intact in the upper and lower extremities. DERMATOLOGIC: No petechiae, rashes, or ecchymoses.       ED COURSE AND MEDICAL DECISION MAKING:      Radiology:  All plain films have been evaluated by myself. They may have been overread by radiologist as noted in chart. Other radiologic studies (i.e. CT, MRI, ultrasounds, etc ) have been interpreted by radiologist.     CT ABDOMEN PELVIS WO CONTRAST Additional Contrast? None   Final Result      1. Small to moderate right pleural effusion and small left pleural effusion with subsegmental atelectasis. No visible pneumonia. 2. Mild mesenteric and retroperitoneal edema, trace ascites and periportal edema. Hepatocellular disease is a possibility. Correlate with liver function tests. XR CHEST (2 VW)   Final Result      No evidence for acute cardiopulmonary disease. CT ABDOMEN PELVIS WO CONTRAST Additional Contrast? None    Result Date: 2/3/2023  EXAM: CT ABDOMEN AND PELVIS WITHOUT CONTRAST INDICATION: Flank pain COMPARISON: None. TECHNIQUE: Axial CT imaging obtained from lung bases through pelvis without contrast. Axial images and multiplanar reformatted images are provided for review. CT radiation dose optimization techniques (automated exposure control, and use of iterative reconstruction techniques, or adjustment of the mA and/or kV according to patient size) were used to limit patient radiation dose. FINDINGS: LUNG BASES: Small to moderate right and small left pleural effusions with basilar dependent atelectasis LIVER: Periportal edema, but otherwise normal liver density. GALLBLADDER AND BILIARY TREE: Gallbladder is contracted. No intra- or extrahepatic biliary dilatation. PANCREAS: Normal. SPLEEN: Normal. ADRENAL GLANDS: Normal. KIDNEYS AND URETERS: No hydronephrosis. Symmetrical normal enhancement. URINARY BLADDER: Normal. REPRODUCTIVE ORGANS: Right adnexal cyst 3.7 cm BOWEL: Normal diameter, nonobstructed. Appendix without thickening or enlargement. LYMPH NODES: There is mild edema involving mesenteric and retroperitoneal fat. There is only trace ascites. No loculated fluid collection or pneumoperitoneum. PERITONEUM/RETROPERITONEUM: No ascites or free air. VESSELS: Aorta is normal diameter ABDOMINAL WALL: Small umbilical hernia contains fat. There is mild subcutaneous edema of the abdominal wall BONES: No destructive process     1. Small to moderate right pleural effusion and small left pleural effusion with subsegmental atelectasis. No visible pneumonia. 2. Mild mesenteric and retroperitoneal edema, trace ascites and periportal edema. Hepatocellular disease is a possibility. Correlate with liver function tests. XR CHEST (2 VW)    Result Date: 2/3/2023  EXAM: CHEST 2 VIEWS X-RAY INDICATION: cough COMPARISON: None FINDINGS: HEART / MEDIASTINUM: Cardiomegaly LUNGS/PLEURA: No dense focal consolidation, pulmonary venous congestion, pleural effusion or pneumothorax. BONES / SOFT TISSUES: No acute abnormality. OTHER: None. No evidence for acute cardiopulmonary disease.       Labs:  Results for orders placed or performed during the hospital encounter of 02/03/23   Rapid influenza A/B antigens    Specimen: Nasopharyngeal   Result Value Ref Range    Rapid Influenza A Ag Negative Negative    Rapid Influenza B Ag Negative Negative   COVID-19, Rapid    Specimen: Nasopharyngeal Swab   Result Value Ref Range    SARS-CoV-2, NAAT Not Detected Not Detected   CBC with Auto Differential   Result Value Ref Range    WBC 12.1 (H) 4.0 - 11.0 K/uL    RBC 4.41 4.00 - 5.20 M/uL    Hemoglobin 11.7 (L) 12.0 - 16.0 g/dL    Hematocrit 36.4 36.0 - 48.0 %    MCV 82.4 80.0 - 100.0 fL    MCH 26.4 26.0 - 34.0 pg    MCHC 32.1 31.0 - 36.0 g/dL    RDW 17.7 (H) 12.4 - 15.4 %    Platelets 924 069 - 987 K/uL    MPV 8.7 5.0 - 10.5 fL    Neutrophils % 58.0 %    Lymphocytes % 34.3 %    Monocytes % 6.6 %    Eosinophils % 0.5 %    Basophils % 0.6 %    Neutrophils Absolute 7.0 1.7 - 7.7 K/uL    Lymphocytes Absolute 4.1 1.0 - 5.1 K/uL    Monocytes Absolute 0.8 0.0 - 1.3 K/uL    Eosinophils Absolute 0.1 0.0 - 0.6 K/uL    Basophils Absolute 0.1 0.0 - 0.2 K/uL Comprehensive Metabolic Panel   Result Value Ref Range    Sodium 138 136 - 145 mmol/L    Potassium 4.8 3.5 - 5.1 mmol/L    Chloride 102 99 - 110 mmol/L    CO2 24 21 - 32 mmol/L    Anion Gap 12 3 - 16    Glucose 107 (H) 70 - 99 mg/dL    BUN 15 7 - 20 mg/dL    Creatinine 0.6 0.6 - 1.1 mg/dL    Est, Glom Filt Rate >60 >60    Calcium 9.0 8.3 - 10.6 mg/dL    Total Protein 6.7 6.4 - 8.2 g/dL    Albumin 3.8 3.4 - 5.0 g/dL    Albumin/Globulin Ratio 1.3 1.1 - 2.2    Total Bilirubin 0.9 0.0 - 1.0 mg/dL    Alkaline Phosphatase 128 40 - 129 U/L     (H) 10 - 40 U/L    AST 55 (H) 15 - 37 U/L   Urinalysis with Reflex to Culture    Specimen: Urine   Result Value Ref Range    Color, UA Yellow Straw/Yellow    Clarity, UA SL CLOUDY (A) Clear    Glucose, Ur Negative Negative mg/dL    Bilirubin Urine Negative Negative    Ketones, Urine Negative Negative mg/dL    Specific Gravity, UA >=1.030 1.005 - 1.030    Blood, Urine LARGE (A) Negative    pH, UA 5.0 5.0 - 8.0    Protein,  (A) Negative mg/dL    Urobilinogen, Urine 1.0 <2.0 E.U./dL    Nitrite, Urine POSITIVE (A) Negative    Leukocyte Esterase, Urine TRACE (A) Negative    Microscopic Examination YES     Urine Type NotGiven     Urine Reflex to Culture Yes    Pregnancy, Urine   Result Value Ref Range    HCG(Urine) Pregnancy Test Negative Detects HCG level >20 MIU/mL   Lipase   Result Value Ref Range    Lipase 54.0 13.0 - 60.0 U/L   Lactic Acid   Result Value Ref Range    Lactic Acid 3.6 (H) 0.4 - 2.0 mmol/L   Microscopic Urinalysis   Result Value Ref Range    Hyaline Casts, UA 11-20 (A) 0 - 2 /LPF    Mucus, UA 1+ (A) None Seen /LPF    WBC, UA 21-50 (A) 0 - 5 /HPF    RBC, UA 11-20 (A) 0 - 4 /HPF    Epithelial Cells, UA 2-5 0 - 5 /HPF    Bacteria, UA 4+ (A) None Seen /HPF   Lactic Acid   Result Value Ref Range    Lactic Acid 3.0 (H) 0.4 - 2.0 mmol/L   Urine Drug Screen   Result Value Ref Range    Amphetamine Screen, Urine POSITIVE (A) Negative <1000ng/mL    Barbiturate Screen, Ur Neg Negative <200 ng/mL    Benzodiazepine Screen, Urine Neg Negative <200 ng/mL    Cannabinoid Scrn, Ur Neg Negative <50 ng/mL    Cocaine Metabolite Screen, Urine Neg Negative <300 ng/mL    Opiate Scrn, Ur Neg Negative <300 ng/mL    PCP Screen, Urine Neg Negative <25 ng/mL    Methadone Screen, Urine Neg Negative <300 ng/mL    Oxycodone Urine Neg Negative <100 ng/ml    FENTANYL SCREEN, URINE Neg Negative <5 ng/mL    pH, UA 5.0     Drug Screen Comment: see below        Treatment in the department:  Patient received the following while in the ED:  Medications   0.9 % sodium chloride infusion (has no administration in time range)   0.9 % sodium chloride bolus (0 mLs IntraVENous Stopped 2/3/23 2152)   ketorolac (TORADOL) injection 30 mg (30 mg IntraVENous Given 2/3/23 2009)   ondansetron (ZOFRAN) injection 4 mg (4 mg IntraVENous Given 2/3/23 2009)   0.9 % sodium chloride bolus (0 mLs IntraVENous Stopped 2/3/23 2341)   cefTRIAXone (ROCEPHIN) 1,000 mg in sodium chloride 0.9 % 50 mL IVPB (mini-bag) (0 mg IntraVENous Stopped 2/3/23 2341)   HYDROcodone-acetaminophen (NORCO) 5-325 MG per tablet 1 tablet (1 tablet Oral Given 2/4/23 0108)       Repeat evaluation  shows patient to be persistent tachycardic but feeling better. Medical decision making and differential diagnosis:  Social determinants affecting care: homeless  Chronic medical conditions affecting care: none    Presents emergency department with subjective fevers for 1 week associated with upper respiratory symptoms, shortness of breath, abdominal pain, back pain and finally vomiting. Patient was afebrile on arrival to the emergency department however was tachycardic but not hypotensive. She had no bronchospasm on exam and no hypoxia satting 100% on room air. Her abdominal exam was benign with diffuse generalized tenderness no localized tenderness or peritoneal signs. Patient had an IV established and she was given IV fluids.   She was treated with Toradol and Zofran for pain and nausea followed by hydrocodone tablet. Patient had mild leukocytosis. No ALYSON or significant electrolyte disturbance. Urinalysis was positive for infection with positive nitrate, leukocyte Estrace, pyuria and bacteriuria. She awas given IV rocephin for this. COVID and flu test were negative. Lactic acid was elevated but again never hypotensive. She was given 30ml/kg IVF bolus and repeat lactic after 1500ml was still high but improving. Additional 250ml/hr IVFs given. Chest x-ray was clear. She was noted to have some elevated LFTs on her labs which is new for her. CT imaging of her abdomen was done to look for hydronephrosis from a stone or potentially gallbladder causes of her LFT elevation and abdominal pain. Patient had no hydronephrosis and no ureteral stones. No CT evidence of cholecystitis although there was some periportal edema which could be suggestive of liver disease. She was noted to have moderate right pleural effusion and small left pleural effusion which was not seen on chest x-ray imaging today. The etiology of this is unclear. There was no definite pneumonia seen. I spoke with Dr. Surjit Lauren. We thoroughly discussed the history, physical exam, laboratory and imaging studies, as well as, emergency department course. Based upon that discussion, we've decided to admit Carol Maza for further observation and evaluation of Barbie Carroll's UTI/urosepsis with pleural effusions. As I have deemed necessary from their history, physical, and studies, I have considered and evaluated Carol Maza for the following diagnoses: covid, influenza, pneumonia, sepsis, pyelonephritis, cholecystitis, pancreatitis         Clinical Impression:  1. Acute pyelonephritis    2. Bilateral pleural effusion    3. Septicemia [facility])        Dispo:  Patient will be admitted at this time. Patient was informed of this decision and agrees with plan.  I have discussed lab and xray findings with patient and they understand. Questions were answered to the best of my ability. Followup:  No follow-up provider specified. Discharge vitals:  Blood pressure (!) 130/93, pulse (!) 114, temperature 97.2 °F (36.2 °C), temperature source Axillary, resp. rate 16, height 5' 3\" (1.6 m), weight 130 lb (59 kg), SpO2 100 %, unknown if currently breastfeeding. Prescriptions given:   New Prescriptions    No medications on file         This chart was created using dragon voice recognition software.        Giselle Ayon MD  02/04/23 0706

## 2023-02-04 ENCOUNTER — APPOINTMENT (OUTPATIENT)
Dept: GENERAL RADIOLOGY | Age: 28
DRG: 871 | End: 2023-02-04
Payer: COMMERCIAL

## 2023-02-04 PROBLEM — N10 ACUTE PYELONEPHRITIS: Status: ACTIVE | Noted: 2023-02-04

## 2023-02-04 LAB
HAV IGM SER IA-ACNC: ABNORMAL
HEPATITIS B CORE IGM ANTIBODY: ABNORMAL
HEPATITIS B SURFACE ANTIGEN INTERPRETATION: REACTIVE
HEPATITIS C ANTIBODY INTERPRETATION: REACTIVE

## 2023-02-04 PROCEDURE — 2580000003 HC RX 258: Performed by: EMERGENCY MEDICINE

## 2023-02-04 PROCEDURE — 2580000003 HC RX 258: Performed by: INTERNAL MEDICINE

## 2023-02-04 PROCEDURE — 6370000000 HC RX 637 (ALT 250 FOR IP): Performed by: INTERNAL MEDICINE

## 2023-02-04 PROCEDURE — 1200000000 HC SEMI PRIVATE

## 2023-02-04 PROCEDURE — 6370000000 HC RX 637 (ALT 250 FOR IP): Performed by: EMERGENCY MEDICINE

## 2023-02-04 PROCEDURE — 6360000002 HC RX W HCPCS: Performed by: INTERNAL MEDICINE

## 2023-02-04 RX ORDER — ONDANSETRON 2 MG/ML
4 INJECTION INTRAMUSCULAR; INTRAVENOUS EVERY 6 HOURS PRN
Status: DISCONTINUED | OUTPATIENT
Start: 2023-02-04 | End: 2023-02-17 | Stop reason: HOSPADM

## 2023-02-04 RX ORDER — MAGNESIUM SULFATE IN WATER 40 MG/ML
2000 INJECTION, SOLUTION INTRAVENOUS PRN
Status: DISCONTINUED | OUTPATIENT
Start: 2023-02-04 | End: 2023-02-17 | Stop reason: HOSPADM

## 2023-02-04 RX ORDER — PROMETHAZINE HYDROCHLORIDE 25 MG/1
12.5 TABLET ORAL EVERY 6 HOURS PRN
Status: DISCONTINUED | OUTPATIENT
Start: 2023-02-04 | End: 2023-02-17 | Stop reason: HOSPADM

## 2023-02-04 RX ORDER — BUSPIRONE HYDROCHLORIDE 10 MG/1
10 TABLET ORAL DAILY
Status: ON HOLD | COMMUNITY
Start: 2021-06-04 | End: 2023-02-10 | Stop reason: CLARIF

## 2023-02-04 RX ORDER — SODIUM CHLORIDE 0.9 % (FLUSH) 0.9 %
10 SYRINGE (ML) INJECTION EVERY 12 HOURS SCHEDULED
Status: DISCONTINUED | OUTPATIENT
Start: 2023-02-04 | End: 2023-02-17 | Stop reason: HOSPADM

## 2023-02-04 RX ORDER — ENOXAPARIN SODIUM 100 MG/ML
40 INJECTION SUBCUTANEOUS DAILY
Status: DISCONTINUED | OUTPATIENT
Start: 2023-02-04 | End: 2023-02-08

## 2023-02-04 RX ORDER — ACETAMINOPHEN 650 MG/1
650 SUPPOSITORY RECTAL EVERY 6 HOURS PRN
Status: DISCONTINUED | OUTPATIENT
Start: 2023-02-04 | End: 2023-02-08

## 2023-02-04 RX ORDER — NICOTINE 21 MG/24HR
1 PATCH, TRANSDERMAL 24 HOURS TRANSDERMAL DAILY
Status: DISCONTINUED | OUTPATIENT
Start: 2023-02-04 | End: 2023-02-17 | Stop reason: HOSPADM

## 2023-02-04 RX ORDER — POTASSIUM CHLORIDE 7.45 MG/ML
10 INJECTION INTRAVENOUS PRN
Status: DISCONTINUED | OUTPATIENT
Start: 2023-02-04 | End: 2023-02-17 | Stop reason: HOSPADM

## 2023-02-04 RX ORDER — SODIUM CHLORIDE 9 MG/ML
INJECTION, SOLUTION INTRAVENOUS CONTINUOUS
Status: DISCONTINUED | OUTPATIENT
Start: 2023-02-04 | End: 2023-02-08

## 2023-02-04 RX ORDER — SODIUM CHLORIDE 0.9 % (FLUSH) 0.9 %
10 SYRINGE (ML) INJECTION PRN
Status: DISCONTINUED | OUTPATIENT
Start: 2023-02-04 | End: 2023-02-17 | Stop reason: HOSPADM

## 2023-02-04 RX ORDER — OXYCODONE HYDROCHLORIDE AND ACETAMINOPHEN 5; 325 MG/1; MG/1
2 TABLET ORAL EVERY 4 HOURS PRN
Status: DISCONTINUED | OUTPATIENT
Start: 2023-02-04 | End: 2023-02-08

## 2023-02-04 RX ORDER — SODIUM CHLORIDE 9 MG/ML
INJECTION, SOLUTION INTRAVENOUS PRN
Status: DISCONTINUED | OUTPATIENT
Start: 2023-02-04 | End: 2023-02-17 | Stop reason: HOSPADM

## 2023-02-04 RX ORDER — ACETAMINOPHEN 325 MG/1
650 TABLET ORAL EVERY 6 HOURS PRN
Status: DISCONTINUED | OUTPATIENT
Start: 2023-02-04 | End: 2023-02-08

## 2023-02-04 RX ORDER — ALBUTEROL SULFATE 90 UG/1
AEROSOL, METERED RESPIRATORY (INHALATION)
Status: ON HOLD | COMMUNITY
Start: 2020-02-13 | End: 2023-02-10 | Stop reason: CLARIF

## 2023-02-04 RX ORDER — OXYCODONE HYDROCHLORIDE 5 MG/1
10 TABLET ORAL EVERY 4 HOURS PRN
Status: DISCONTINUED | OUTPATIENT
Start: 2023-02-04 | End: 2023-02-04

## 2023-02-04 RX ORDER — NIFEDIPINE 90 MG/1
90 TABLET, EXTENDED RELEASE ORAL DAILY
Status: ON HOLD | COMMUNITY
Start: 2022-10-01 | End: 2023-02-10 | Stop reason: CLARIF

## 2023-02-04 RX ORDER — LORATADINE 10 MG/1
10 TABLET ORAL DAILY
Status: ON HOLD | COMMUNITY
Start: 2022-01-10 | End: 2023-02-10 | Stop reason: CLARIF

## 2023-02-04 RX ORDER — OXYCODONE HYDROCHLORIDE AND ACETAMINOPHEN 5; 325 MG/1; MG/1
1 TABLET ORAL EVERY 4 HOURS PRN
Status: DISCONTINUED | OUTPATIENT
Start: 2023-02-04 | End: 2023-02-08

## 2023-02-04 RX ORDER — OXYCODONE HYDROCHLORIDE 5 MG/1
5 TABLET ORAL EVERY 4 HOURS PRN
Status: DISCONTINUED | OUTPATIENT
Start: 2023-02-04 | End: 2023-02-04

## 2023-02-04 RX ORDER — LABETALOL 200 MG/1
200 TABLET, FILM COATED ORAL DAILY
Status: ON HOLD | COMMUNITY
Start: 2022-10-01 | End: 2023-02-10 | Stop reason: CLARIF

## 2023-02-04 RX ADMIN — SODIUM CHLORIDE 1000 ML: 9 INJECTION, SOLUTION INTRAVENOUS at 05:33

## 2023-02-04 RX ADMIN — SODIUM CHLORIDE: 9 INJECTION, SOLUTION INTRAVENOUS at 09:48

## 2023-02-04 RX ADMIN — SODIUM CHLORIDE, PRESERVATIVE FREE 10 ML: 5 INJECTION INTRAVENOUS at 08:24

## 2023-02-04 RX ADMIN — ACETAMINOPHEN 650 MG: 325 TABLET ORAL at 08:23

## 2023-02-04 RX ADMIN — CEFTRIAXONE 1000 MG: 1 INJECTION, POWDER, FOR SOLUTION INTRAMUSCULAR; INTRAVENOUS at 16:44

## 2023-02-04 RX ADMIN — HYDROCODONE BITARTRATE AND ACETAMINOPHEN 1 TABLET: 5; 325 TABLET ORAL at 01:08

## 2023-02-04 RX ADMIN — OXYCODONE AND ACETAMINOPHEN 1 TABLET: 5; 325 TABLET ORAL at 20:15

## 2023-02-04 RX ADMIN — OXYCODONE AND ACETAMINOPHEN 2 TABLET: 5; 325 TABLET ORAL at 12:13

## 2023-02-04 ASSESSMENT — PAIN SCALES - GENERAL
PAINLEVEL_OUTOF10: 0
PAINLEVEL_OUTOF10: 6
PAINLEVEL_OUTOF10: 7
PAINLEVEL_OUTOF10: 5
PAINLEVEL_OUTOF10: 5
PAINLEVEL_OUTOF10: 9

## 2023-02-04 ASSESSMENT — PAIN DESCRIPTION - LOCATION
LOCATION: ABDOMEN
LOCATION: ABDOMEN
LOCATION: HEAD

## 2023-02-04 ASSESSMENT — PAIN DESCRIPTION - DESCRIPTORS
DESCRIPTORS: ACHING
DESCRIPTORS: ACHING;DISCOMFORT
DESCRIPTORS: DISCOMFORT

## 2023-02-04 ASSESSMENT — PAIN SCALES - WONG BAKER
WONGBAKER_NUMERICALRESPONSE: 0
WONGBAKER_NUMERICALRESPONSE: 0

## 2023-02-04 NOTE — PROGRESS NOTES
Pt. Is currently refusing blood draws, Stating that the only way she would like her blood drawn is through her IV. This is unable to be done as the IV is not newly placed.      Electronically signed by Cici Hill RN on 2/4/2023 at 5:25 AM

## 2023-02-04 NOTE — ED NOTES
One set of blood cultures where drawn pt refused to have second set done     Juan Shoemaker [facility]  02/03/23 3876

## 2023-02-04 NOTE — H&P
Hospital Medicine History & Physical      PCP: No primary care provider on file. Date of Admission: 2/3/2023    Date of Service: Pt seen/examined on 23 and Admitted to Inpatient with expected LOS greater than two midnights due to medical therapy. Chief Complaint:  Abdominal pain and back pain     History Of Present Illness:      Coleen Lira is a 28 y.o. female with past medical history as below, including hx of HCV, asthma, bipolar disorder, HTN who presents with abdominal pain and back pain as well as nausea and vomiting. Endorsed fevers, cough, and shortness of breath in ER also. Currently patient complains only of not being able to get enough sleep. Her pain is much less. No further nausea or vomiting. She is not concerned for pregnancy and she just got her period. She has not followed up for her hepatitis diagnosis. ED: Leukocytosis 12.1, HR elevated 110s, lactic acid 3.6. Lipase 54. Electrolytes ok. Mild elevation transaminases. UA indicative of infection. CT scan of the abdomen was notable for small to  moderate R pleural effusion and small left pleural effusion, mild mesenteric and retroperitoneal edema, trace ascites and periportal edema. Past Medical History:          Diagnosis Date    Asthma     Bipolar 1 disorder ([facility] [address]     Hypertension        Past Surgical History:          Procedure Laterality Date     SECTION         Medications Prior to Admission:      Prior to Admission medications    Medication Sig Start Date End Date Taking?  Authorizing Provider   lurasidone (LATUDA) 40 MG TABS tablet Take 40 mg by mouth daily (with breakfast) 22  Yes Historical Provider, MD   loratadine (CLARITIN) 10 MG tablet Take 10 mg by mouth daily 1/10/22  Yes Historical Provider, MD   busPIRone (BUSPAR) 10 MG tablet Take 10 mg by mouth daily 21  Yes Historical Provider, MD   albuterol sulfate HFA (PROVENTIL;VENTOLIN;PROAIR) 108 (90 Base) MCG/ACT inhaler 2 puffs q4h prn 2/13/20  Yes Historical Provider, MD   labetalol (NORMODYNE) 200 MG tablet Take 200 mg by mouth daily 10/1/22  Yes Historical Provider, MD   NIFEdipine (PROCARDIA XL) 90 MG extended release tablet Take 90 mg by mouth daily 10/1/22  Yes Historical Provider, MD   VORTIoxetine (TRINTELLIX) 10 MG TABS tablet Take 10 mg by mouth daily 1/11/22  Yes Historical Provider, MD   ibuprofen (ADVIL;MOTRIN) 800 MG tablet Take 1 tablet by mouth every 8 hours as needed for Pain or Fever 8/21/17   Mark Jauregui,    calcium carbonate (TUMS) 500 MG chewable tablet Take 1 tablet by mouth daily    Historical Provider, MD   Prenatal MV-Min-Fe Fum-FA-DHA (PRENATAL 1 PO) Take by mouth  Patient not taking: Reported on 2/4/2023    Historical Provider, MD       Allergies:  Latex, Bee venom, Pcn [penicillins], Amoxil [amoxicillin], and Sulfa antibiotics    Social History:      The patient currently lives in private residence. TOBACCO:   reports that she has been smoking cigarettes. She has been smoking an average of .5 packs per day. She has never used smokeless tobacco.  ETOH:   reports no history of alcohol use. Family History:      Reviewed in detail and positive as follows:        Problem Relation Age of Onset    Asthma Sister     Diabetes Maternal Grandmother     Heart Disease Maternal Grandmother        REVIEW OF SYSTEMS:   Pertinent positives as noted in the HPI. All other systems reviewed and negative. PHYSICAL EXAM:    BP (!) 136/105   Pulse (!) 106   Temp 97.4 °F (36.3 °C) (Oral)   Resp 18   Ht 5' 3\" (1.6 m)   Wt 153 lb (69.4 kg)   SpO2 98%   BMI 27.10 kg/m²     General appearance: No apparent distress, appears stated age and cooperative. HEENT: Normal cephalic, atraumatic without obvious deformity. Pupils equal, round, and reactive to light. Extra ocular muscles intact. Conjunctivae/corneas clear. Neck: Supple, with full range of motion. No jugular venous distention. Trachea midline.   Respiratory:  Normal respiratory effort. Clear to auscultation, bilaterally without Rales/Wheezes/Rhonchi. Cardiovascular: Regular rate and rhythm with normal S1/S2 without murmurs, rubs or gallops. Abdomen: Soft, non-tender, non-distended with normal bowel sounds. Musculoskelatal: No clubbing, cyanosis or edema bilaterally. Full range of motion without deformity. Skin: Skin color, texture, turgor normal.  No rashes or lesions. Neurologic:  Neurovascularly intact without any focal sensory/motor deficits. Cranial nerves: II-XII intact, grossly non-focal.  Psychiatric: Alert and oriented, thought content appropriate, normal insight    Labs:     Recent Labs     02/03/23 1949   WBC 12.1*   HGB 11.7*   HCT 36.4        Recent Labs     02/03/23 1949      K 4.8      CO2 24   BUN 15   CREATININE 0.6   CALCIUM 9.0     Recent Labs     02/03/23 1949   AST 55*   *   BILITOT 0.9   ALKPHOS 128     No results for input(s): INR in the last 72 hours. No results for input(s): Sowmya Height in the last 72 hours. Urinalysis:      Lab Results   Component Value Date/Time    NITRU POSITIVE 02/03/2023 07:49 PM    WBCUA 21-50 02/03/2023 07:49 PM    BACTERIA 4+ 02/03/2023 07:49 PM    RBCUA 11-20 02/03/2023 07:49 PM    RBCUA 9 08/10/2021 01:51 PM    BLOODU LARGE 02/03/2023 07:49 PM    SPECGRAV >=1.030 02/03/2023 07:49 PM    GLUCOSEU Negative 02/03/2023 07:49 PM       Studies:  CT ABDOMEN PELVIS WO CONTRAST Additional Contrast? None   Final Result      1. Small to moderate right pleural effusion and small left pleural effusion with subsegmental atelectasis. No visible pneumonia. 2. Mild mesenteric and retroperitoneal edema, trace ascites and periportal edema. Hepatocellular disease is a possibility. Correlate with liver function tests. XR CHEST (2 VW)   Final Result      No evidence for acute cardiopulmonary disease.                 ASSESSMENT:    Active Hospital Problems    Diagnosis Date Noted    Acute pyelonephritis [N10] 02/04/2023     Priority: Medium       PLAN:    Abdominal pain, nausea and vomiting due to suspected UTI, with back pain suspected pyelonephritis  Pregnancy test negative  Blood Cx pending however patient refused to allow phlebotomy to complete collection  will follow up available Cx data  Follow-up urine cx  IV fluids  Advance diet as tolerated  Continue Rocephin for UTI    Sm to moderate b/l pleural effusions  CT concering for hepatocellular disease  Abnormal LFTs  History of hepatitis C  Acute hepatitis panel is pending    Amphetamine abuse  Trend LFTs    DVT Prophylaxis: Lovenox  Diet: ADULT DIET; Regular; 3 carb choices (45 gm/meal); Low Fat/Low Chol/High Fiber/2 gm Na;  Low Sodium (2 gm)  Code Status: Full Code    PT/OT Eval Status: Not ordered    Dispo - Inpatient pending Cx data, ability to take PO    Davina Rivas DO

## 2023-02-04 NOTE — PROGRESS NOTES
Pt is reporting pain in her lower right leg, stating she \"fell down some stairs\". MD made aware.     Electronically signed by Terry Brink RN on 2/4/2023 at 5:26 AM

## 2023-02-04 NOTE — PROGRESS NOTES
4 Eyes Admission Assessment     I agree as the admission nurse that 2 RN's have performed a thorough Head to Toe Skin Assessment on the patient. ALL assessment sites listed below have been assessed on admission. Areas assessed by both nurses:   [x]   Head, Face, and Ears   [x]   Shoulders, Back, and Chest  [x]   Arms, Elbows, and Hands   [x]   Coccyx, Sacrum, and Ischium  [x]   Legs, Feet, and Heels    - small scattered bruises noted on patients left and right arm.  -Right foot appears to be swollen, pt stating that she fell down stairs, MD notified        Does the Patient have Skin Breakdown?   No         Kevin Prevention initiated:  No   Wound Care Orders initiated:  No      [facility] nurse consulted for Pressure Injury (Stage 3,4, Unstageable, DTI, NWPT, and Complex wounds) or Kevin score 18 or lower:  No      Nurse 1 eSignature: Electronically signed by Farhad George RN on 2/4/23 at 4:58 AM EST    **SHARE this note so that the co-signing nurse is able to place an eSignature**    Nurse 2 eSignature: Electronically signed by Garcia Garcia RN on 2/4/23 at 5:52 AM EST

## 2023-02-04 NOTE — PLAN OF CARE
Problem: Discharge Planning  Goal: Discharge to home or other facility with appropriate resources  Outcome: Progressing  Flowsheets  Taken 2/4/2023 0549  Discharge to home or other facility with appropriate resources:   Identify barriers to discharge with patient and caregiver   Arrange for needed discharge resources and transportation as appropriate   Identify discharge learning needs (meds, wound care, etc)  Taken 2/4/2023 0457  Discharge to home or other facility with appropriate resources: Identify barriers to discharge with patient and caregiver     Problem: Safety - Adult  Goal: Free from fall injury  Outcome: Progressing  Flowsheets (Taken 2/4/2023 0549)  Free From Fall Injury: Instruct family/caregiver on patient safety     Problem: ABCDS Injury Assessment  Goal: Absence of physical injury  Outcome: Progressing  Flowsheets (Taken 2/4/2023 0549)  Absence of Physical Injury: Implement safety measures based on patient assessment

## 2023-02-04 NOTE — PROGRESS NOTES
Pt is refusing Right leg/ankle Xray's this morning. MD Made aware.   Electronically signed by Harper Naik RN on 2/4/2023 at 6:48 AM

## 2023-02-05 LAB
ALBUMIN SERPL-MCNC: 3.2 G/DL (ref 3.4–5)
ALP BLD-CCNC: 113 U/L (ref 40–129)
ALT SERPL-CCNC: 147 U/L (ref 10–40)
ANION GAP SERPL CALCULATED.3IONS-SCNC: 9 MMOL/L (ref 3–16)
AST SERPL-CCNC: 130 U/L (ref 15–37)
BASOPHILS ABSOLUTE: 0.1 K/UL (ref 0–0.2)
BASOPHILS RELATIVE PERCENT: 0.9 %
BILIRUB SERPL-MCNC: 0.7 MG/DL (ref 0–1)
BILIRUBIN DIRECT: <0.2 MG/DL (ref 0–0.3)
BILIRUBIN, INDIRECT: ABNORMAL MG/DL (ref 0–1)
BUN BLDV-MCNC: 23 MG/DL (ref 7–20)
CALCIUM SERPL-MCNC: 8.3 MG/DL (ref 8.3–10.6)
CHLORIDE BLD-SCNC: 104 MMOL/L (ref 99–110)
CO2: 19 MMOL/L (ref 21–32)
CREAT SERPL-MCNC: 0.8 MG/DL (ref 0.6–1.1)
EOSINOPHILS ABSOLUTE: 0.1 K/UL (ref 0–0.6)
EOSINOPHILS RELATIVE PERCENT: 0.7 %
GFR SERPL CREATININE-BSD FRML MDRD: >60 ML/MIN/{1.73_M2}
GLUCOSE BLD-MCNC: 124 MG/DL (ref 70–99)
HCG QUALITATIVE: NEGATIVE
HCT VFR BLD CALC: 32.8 % (ref 36–48)
HEMOGLOBIN: 10.5 G/DL (ref 12–16)
LYMPHOCYTES ABSOLUTE: 3.3 K/UL (ref 1–5.1)
LYMPHOCYTES RELATIVE PERCENT: 35.2 %
MCH RBC QN AUTO: 26.9 PG (ref 26–34)
MCHC RBC AUTO-ENTMCNC: 32.1 G/DL (ref 31–36)
MCV RBC AUTO: 83.9 FL (ref 80–100)
MONOCYTES ABSOLUTE: 0.8 K/UL (ref 0–1.3)
MONOCYTES RELATIVE PERCENT: 8.3 %
NEUTROPHILS ABSOLUTE: 5.2 K/UL (ref 1.7–7.7)
NEUTROPHILS RELATIVE PERCENT: 54.9 %
ORGANISM: ABNORMAL
PDW BLD-RTO: 17.6 % (ref 12.4–15.4)
PLATELET # BLD: 344 K/UL (ref 135–450)
PMV BLD AUTO: 9 FL (ref 5–10.5)
POTASSIUM REFLEX MAGNESIUM: 5.2 MMOL/L (ref 3.5–5.1)
RBC # BLD: 3.91 M/UL (ref 4–5.2)
SODIUM BLD-SCNC: 132 MMOL/L (ref 136–145)
TOTAL CK: 52 U/L (ref 26–192)
TOTAL PROTEIN: 5.3 G/DL (ref 6.4–8.2)
URINE CULTURE, ROUTINE: ABNORMAL
URINE CULTURE, ROUTINE: ABNORMAL
WBC # BLD: 9.4 K/UL (ref 4–11)

## 2023-02-05 PROCEDURE — 2580000003 HC RX 258: Performed by: INTERNAL MEDICINE

## 2023-02-05 PROCEDURE — 80076 HEPATIC FUNCTION PANEL: CPT

## 2023-02-05 PROCEDURE — 6360000002 HC RX W HCPCS: Performed by: INTERNAL MEDICINE

## 2023-02-05 PROCEDURE — 6370000000 HC RX 637 (ALT 250 FOR IP): Performed by: INTERNAL MEDICINE

## 2023-02-05 PROCEDURE — 82550 ASSAY OF CK (CPK): CPT

## 2023-02-05 PROCEDURE — 85025 COMPLETE CBC W/AUTO DIFF WBC: CPT

## 2023-02-05 PROCEDURE — 1200000000 HC SEMI PRIVATE

## 2023-02-05 PROCEDURE — 80048 BASIC METABOLIC PNL TOTAL CA: CPT

## 2023-02-05 PROCEDURE — 84703 CHORIONIC GONADOTROPIN ASSAY: CPT

## 2023-02-05 RX ORDER — POLYETHYLENE GLYCOL 3350 17 G/17G
17 POWDER, FOR SOLUTION ORAL DAILY
Status: DISCONTINUED | OUTPATIENT
Start: 2023-02-05 | End: 2023-02-17 | Stop reason: HOSPADM

## 2023-02-05 RX ORDER — LORAZEPAM 2 MG/ML
1 INJECTION INTRAMUSCULAR EVERY 6 HOURS PRN
Status: DISCONTINUED | OUTPATIENT
Start: 2023-02-05 | End: 2023-02-17 | Stop reason: HOSPADM

## 2023-02-05 RX ORDER — HYDROXYZINE HYDROCHLORIDE 10 MG/1
10 TABLET, FILM COATED ORAL 3 TIMES DAILY PRN
Status: DISCONTINUED | OUTPATIENT
Start: 2023-02-05 | End: 2023-02-17 | Stop reason: HOSPADM

## 2023-02-05 RX ADMIN — LORAZEPAM 1 MG: 2 INJECTION INTRAMUSCULAR; INTRAVENOUS at 11:23

## 2023-02-05 RX ADMIN — SODIUM CHLORIDE, PRESERVATIVE FREE 10 ML: 5 INJECTION INTRAVENOUS at 20:15

## 2023-02-05 RX ADMIN — ONDANSETRON 4 MG: 2 INJECTION INTRAMUSCULAR; INTRAVENOUS at 18:08

## 2023-02-05 RX ADMIN — OXYCODONE AND ACETAMINOPHEN 2 TABLET: 5; 325 TABLET ORAL at 07:56

## 2023-02-05 RX ADMIN — CEFTRIAXONE 1000 MG: 1 INJECTION, POWDER, FOR SOLUTION INTRAMUSCULAR; INTRAVENOUS at 16:33

## 2023-02-05 RX ADMIN — LORAZEPAM 1 MG: 2 INJECTION INTRAMUSCULAR; INTRAVENOUS at 21:53

## 2023-02-05 RX ADMIN — SODIUM CHLORIDE: 9 INJECTION, SOLUTION INTRAVENOUS at 01:05

## 2023-02-05 RX ADMIN — SODIUM CHLORIDE: 9 INJECTION, SOLUTION INTRAVENOUS at 11:24

## 2023-02-05 RX ADMIN — SODIUM CHLORIDE: 9 INJECTION, SOLUTION INTRAVENOUS at 20:21

## 2023-02-05 RX ADMIN — ONDANSETRON 4 MG: 2 INJECTION INTRAMUSCULAR; INTRAVENOUS at 20:15

## 2023-02-05 RX ADMIN — POLYETHYLENE GLYCOL 3350 17 G: 17 POWDER, FOR SOLUTION ORAL at 20:14

## 2023-02-05 ASSESSMENT — PAIN DESCRIPTION - ORIENTATION: ORIENTATION: MID;RIGHT;LEFT

## 2023-02-05 ASSESSMENT — PAIN DESCRIPTION - PAIN TYPE: TYPE: ACUTE PAIN

## 2023-02-05 ASSESSMENT — PAIN SCALES - GENERAL
PAINLEVEL_OUTOF10: 2
PAINLEVEL_OUTOF10: 7
PAINLEVEL_OUTOF10: 0
PAINLEVEL_OUTOF10: 0

## 2023-02-05 ASSESSMENT — PAIN DESCRIPTION - DESCRIPTORS: DESCRIPTORS: ACHING

## 2023-02-05 ASSESSMENT — PAIN DESCRIPTION - LOCATION: LOCATION: FLANK;ABDOMEN

## 2023-02-05 ASSESSMENT — PAIN DESCRIPTION - ONSET: ONSET: AWAKENED FROM SLEEP

## 2023-02-05 ASSESSMENT — PAIN DESCRIPTION - FREQUENCY: FREQUENCY: CONTINUOUS

## 2023-02-05 ASSESSMENT — PAIN - FUNCTIONAL ASSESSMENT: PAIN_FUNCTIONAL_ASSESSMENT: ACTIVITIES ARE NOT PREVENTED

## 2023-02-05 NOTE — PROGRESS NOTES
Hospitalist Progress Note      PCP: No primary care provider on file. Date of Admission: 2/3/2023    Chief Complaint: Abdominal/back pain    Subjective:     Feels better, had some nausea and itching. Agitated earlier this am. Denies alcohol abuse. Not agreeable to labs earlier today. Medications:  Reviewed    Infusion Medications    sodium chloride      sodium chloride 100 mL/hr at 02/05/23 1124     Scheduled Medications    sodium chloride flush  10 mL IntraVENous 2 times per day    enoxaparin  40 mg SubCUTAneous Daily    nicotine  1 patch TransDERmal Daily    cefTRIAXone (ROCEPHIN) IV  1,000 mg IntraVENous Q24H     PRN Meds: LORazepam, hydrOXYzine HCl, sodium chloride flush, sodium chloride, potassium chloride, magnesium sulfate, promethazine **OR** ondansetron, acetaminophen **OR** acetaminophen, oxyCODONE-acetaminophen **OR** oxyCODONE-acetaminophen      Intake/Output Summary (Last 24 hours) at 2/5/2023 1722  Last data filed at 2/5/2023 1500  Gross per 24 hour   Intake 600 ml   Output 0 ml   Net 600 ml       Exam:    BP (!) 129/92   Pulse (!) 106   Temp 98.4 °F (36.9 °C) (Oral)   Resp 20   Ht 5' 3\" (1.6 m)   Wt 157 lb 3 oz (71.3 kg)   SpO2 96%   BMI 27.84 kg/m²     General appearance: No apparent distress, appears stated age and cooperative. HEENT: Pupils equal, round, and reactive to light. Conjunctivae/corneas clear. Neck: Supple, with full range of motion. No jugular venous distention. Trachea midline. Respiratory:  Normal respiratory effort. Clear to auscultation, bilaterally without Rales/Wheezes/Rhonchi. Cardiovascular: Regular rate and rhythm with normal S1/S2 without murmurs, rubs or gallops. Abdomen: Soft, non-tender, non-distended with normal bowel sounds. Musculoskelatal: No clubbing, cyanosis or edema bilaterally. Skin: Skin color, texture, turgor normal.  No rashes or lesions.   Neurologic:  Cranial nerves: II-XII intact, grossly non-focal.  Psychiatric: Alert and oriented, thought content appropriate, normal insight    Labs:   Recent Labs     02/03/23 1949 02/05/23  1242   WBC 12.1* 9.4   HGB 11.7* 10.5*   HCT 36.4 32.8*    344     Recent Labs     02/03/23 1949 02/05/23  1242    132*   K 4.8 5.2*    104   CO2 24 19*   BUN 15 23*   CREATININE 0.6 0.8   CALCIUM 9.0 8.3     Recent Labs     02/03/23 1949   AST 55*   *   BILITOT 0.9   ALKPHOS 128     No results for input(s): INR in the last 72 hours. Recent Labs     02/05/23  1242   CKTOTAL 52       Studies:  CT ABDOMEN PELVIS WO CONTRAST Additional Contrast? None   Final Result      1. Small to moderate right pleural effusion and small left pleural effusion with subsegmental atelectasis. No visible pneumonia. 2. Mild mesenteric and retroperitoneal edema, trace ascites and periportal edema. Hepatocellular disease is a possibility. Correlate with liver function tests. XR CHEST (2 VW)   Final Result      No evidence for acute cardiopulmonary disease. Assessment/Plan:    Active Hospital Problems    Diagnosis Date Noted    Acute pyelonephritis [N10] 02/04/2023     Priority: Medium       PLAN:     Abdominal pain, nausea and vomiting due to suspected UTI, with back pain suspected pyelonephritis  Pregnancy test negative  Blood Cx pending however patient refused to allow phlebotomy to complete collection  will follow up available Cx data  Follow-up urine cx  IV fluids  Advance diet as tolerated  Continue Rocephin for UTI     Sm to moderate b/l pleural effusions  CT concering for hepatocellular disease  Abnormal LFTs  History of hepatitis C previously on treatment however relapsed drug abuse  Acute hepatitis panel in process     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

## 2023-02-05 NOTE — PLAN OF CARE
Problem: Discharge Planning  Goal: Discharge to home or other facility with appropriate resources  Outcome: Progressing     Problem: Safety - Adult  Goal: Free from fall injury  Outcome: Progressing     Problem: ABCDS Injury Assessment  Goal: Absence of physical injury  Outcome: Progressing     Problem: Pain  Goal: Verbalizes/displays adequate comfort level or baseline comfort level  2/4/2023 2011 by Alea Garcia RN  Outcome: Progressing  2/4/2023 0833 by Rosangela Pepe RN  Outcome: Progressing

## 2023-02-05 NOTE — PLAN OF CARE
Problem: Discharge Planning  Goal: Discharge to home or other facility with appropriate resources  Outcome: Progressing  Flowsheets (Taken 2/5/2023 1034)  Discharge to home or other facility with appropriate resources:   Identify barriers to discharge with patient and caregiver   Arrange for needed discharge resources and transportation as appropriate   Identify discharge learning needs (meds, wound care, etc)  Note: Pt has elevated HR and blood pressures. Pt also reporting significant pain this am.  Provider notified. NS running continuous at 100 mL/hr. Pt refusing labs this am.  Provider aware. Problem: Safety - Adult  Goal: Free from fall injury  Outcome: Progressing  Flowsheets (Taken 2/5/2023 1034)  Free From Fall Injury: Instruct family/caregiver on patient safety  Note:  Pt is a Fall Risk. See Raimundo Carrionon Fall Risk Score. Pt bed in low position and side rails up. Call light and belongings in reach. Pt encouraged to call for assistance. Will continue with hourly rounds for PO intake, pain needs, toileting, and repositioning as needed. Problem: Pain  Goal: Verbalizes/displays adequate comfort level or baseline comfort level  Outcome: Progressing  Flowsheets (Taken 2/5/2023 1034)  Verbalizes/displays adequate comfort level or baseline comfort level:   Encourage patient to monitor pain and request assistance   Assess pain using appropriate pain scale   Administer analgesics based on type and severity of pain and evaluate response   Implement non-pharmacological measures as appropriate and evaluate response  Note: Pt able to utilize numeric pain scale. Repositioning and environmental changes utilized to help manage pain.   Pt reports some relief with pharm and nonpharm interventions but not to goal.

## 2023-02-06 LAB — HEPATITIS B SURFACE ANTIGEN CONFIRMATION: POSITIVE

## 2023-02-06 PROCEDURE — 2580000003 HC RX 258: Performed by: INTERNAL MEDICINE

## 2023-02-06 PROCEDURE — 6370000000 HC RX 637 (ALT 250 FOR IP): Performed by: INTERNAL MEDICINE

## 2023-02-06 PROCEDURE — 6360000002 HC RX W HCPCS: Performed by: INTERNAL MEDICINE

## 2023-02-06 PROCEDURE — 1200000000 HC SEMI PRIVATE

## 2023-02-06 RX ADMIN — SODIUM CHLORIDE: 9 INJECTION, SOLUTION INTRAVENOUS at 10:50

## 2023-02-06 RX ADMIN — SODIUM CHLORIDE, PRESERVATIVE FREE 10 ML: 5 INJECTION INTRAVENOUS at 08:18

## 2023-02-06 RX ADMIN — OXYCODONE AND ACETAMINOPHEN 2 TABLET: 5; 325 TABLET ORAL at 01:57

## 2023-02-06 RX ADMIN — CEFTRIAXONE 1000 MG: 1 INJECTION, POWDER, FOR SOLUTION INTRAMUSCULAR; INTRAVENOUS at 16:11

## 2023-02-06 RX ADMIN — ONDANSETRON 4 MG: 2 INJECTION INTRAMUSCULAR; INTRAVENOUS at 19:58

## 2023-02-06 RX ADMIN — SODIUM CHLORIDE, PRESERVATIVE FREE 10 ML: 5 INJECTION INTRAVENOUS at 19:58

## 2023-02-06 RX ADMIN — ACETAMINOPHEN 650 MG: 325 TABLET ORAL at 17:28

## 2023-02-06 RX ADMIN — OXYCODONE AND ACETAMINOPHEN 1 TABLET: 5; 325 TABLET ORAL at 22:30

## 2023-02-06 ASSESSMENT — PAIN DESCRIPTION - ORIENTATION: ORIENTATION: MID

## 2023-02-06 ASSESSMENT — PAIN SCALES - WONG BAKER: WONGBAKER_NUMERICALRESPONSE: 0

## 2023-02-06 ASSESSMENT — PAIN SCALES - GENERAL
PAINLEVEL_OUTOF10: 0
PAINLEVEL_OUTOF10: 5
PAINLEVEL_OUTOF10: 10
PAINLEVEL_OUTOF10: 3
PAINLEVEL_OUTOF10: 0
PAINLEVEL_OUTOF10: 3
PAINLEVEL_OUTOF10: 0

## 2023-02-06 ASSESSMENT — PAIN DESCRIPTION - ONSET: ONSET: ON-GOING

## 2023-02-06 ASSESSMENT — PAIN DESCRIPTION - LOCATION
LOCATION: HEAD;BACK
LOCATION: BACK

## 2023-02-06 ASSESSMENT — PAIN DESCRIPTION - FREQUENCY: FREQUENCY: INTERMITTENT

## 2023-02-06 ASSESSMENT — PAIN DESCRIPTION - DESCRIPTORS: DESCRIPTORS: ACHING

## 2023-02-06 ASSESSMENT — PAIN - FUNCTIONAL ASSESSMENT: PAIN_FUNCTIONAL_ASSESSMENT: ACTIVITIES ARE NOT PREVENTED

## 2023-02-06 ASSESSMENT — PAIN DESCRIPTION - PAIN TYPE: TYPE: ACUTE PAIN

## 2023-02-06 NOTE — PROGRESS NOTES
Hospitalist Progress Note      PCP: No primary care provider on file. Date of Admission: 2/3/2023    Chief Complaint: Abdominal pain, UTI      Subjective: Chart reviewed. Patient admitted with sepsis from UTI, drug abuse and she is homeless. This morning she is agitated and refusing blood draw. She reports a history of bipolar disorder and bulimia nervosa. Medications:  Reviewed    Infusion Medications    sodium chloride      sodium chloride 100 mL/hr at 02/06/23 1050     Scheduled Medications    polyethylene glycol  17 g Oral Daily    sodium chloride flush  10 mL IntraVENous 2 times per day    enoxaparin  40 mg SubCUTAneous Daily    nicotine  1 patch TransDERmal Daily    cefTRIAXone (ROCEPHIN) IV  1,000 mg IntraVENous Q24H     PRN Meds: LORazepam, hydrOXYzine HCl, sodium chloride flush, sodium chloride, potassium chloride, magnesium sulfate, promethazine **OR** ondansetron, acetaminophen **OR** acetaminophen, oxyCODONE-acetaminophen **OR** oxyCODONE-acetaminophen      Intake/Output Summary (Last 24 hours) at 2/6/2023 1301  Last data filed at 2/6/2023 1014  Gross per 24 hour   Intake 360 ml   Output 0 ml   Net 360 ml       Physical Exam Performed:    BP (!) 145/110   Pulse (!) 110   Temp 98 °F (36.7 °C) (Oral)   Resp 18   Ht 5' 3\" (1.6 m)   Wt 157 lb 3 oz (71.3 kg)   SpO2 96%   BMI 27.84 kg/m²     General appearance: Agitated  HEENT: Pupils equal, round, and reactive to light. Conjunctivae/corneas clear. Neck: Supple, with full range of motion. No jugular venous distention. Trachea midline. Respiratory:  Normal respiratory effort. Clear to auscultation, bilaterally without Rales/Wheezes/Rhonchi. Cardiovascular: Regular rate and rhythm with normal S1/S2 without murmurs, rubs or gallops. Abdomen: Soft, non-tender, non-distended with normal bowel sounds. Musculoskeletal: No clubbing, cyanosis or edema bilaterally. Full range of motion without deformity.   Skin: Skin color, texture, turgor normal.  No rashes or lesions. Neurologic: Awake, oriented x3 and moves all 4 extremities  Capillary Refill: Brisk, 3 seconds, normal   Peripheral Pulses: +2 palpable, equal bilaterally       Labs:   Recent Labs     02/03/23 1949 02/05/23  1242   WBC 12.1* 9.4   HGB 11.7* 10.5*   HCT 36.4 32.8*    344     Recent Labs     02/03/23 1949 02/05/23  1242    132*   K 4.8 5.2*    104   CO2 24 19*   BUN 15 23*   CREATININE 0.6 0.8   CALCIUM 9.0 8.3     Recent Labs     02/03/23 1949 02/05/23  1242   AST 55* 130*   * 147*   BILIDIR  --  <0.2   BILITOT 0.9 0.7   ALKPHOS 128 113     No results for input(s): INR in the last 72 hours. Recent Labs     02/05/23  1242   CKTOTAL 52       Urinalysis:      Lab Results   Component Value Date/Time    NITRU POSITIVE 02/03/2023 07:49 PM    WBCUA 21-50 02/03/2023 07:49 PM    BACTERIA 4+ 02/03/2023 07:49 PM    RBCUA 11-20 02/03/2023 07:49 PM    RBCUA 9 08/10/2021 01:51 PM    BLOODU LARGE 02/03/2023 07:49 PM    SPECGRAV >=1.030 02/03/2023 07:49 PM    GLUCOSEU Negative 02/03/2023 07:49 PM       Radiology:  CT ABDOMEN PELVIS WO CONTRAST Additional Contrast? None   Final Result      1. Small to moderate right pleural effusion and small left pleural effusion with subsegmental atelectasis. No visible pneumonia. 2. Mild mesenteric and retroperitoneal edema, trace ascites and periportal edema. Hepatocellular disease is a possibility. Correlate with liver function tests. XR CHEST (2 VW)   Final Result      No evidence for acute cardiopulmonary disease. IP CONSULT TO SOCIAL WORK  IP CONSULT TO PSYCHOLOGY    Assessment/Plan:    Active Hospital Problems    Diagnosis     Acute pyelonephritis [N10]      Priority: Medium     1. Sepsis due to UTI, present on admission  -Urine culture grew mixed cruzito.  -Continue IV antibiotic  -Continue IV fluid  -Blood culture negative thus far.     2.  Amphetamine abuse  -She is likely withdrawing from amphetamine with agitation, elevated heart rate and blood pressure  -Continue to monitor    3. Active viral hepatitis with concern for chronic liver disease  -Hepatitis C is positive. She has been treated in the past.  -Hepatitis B surface antigen is also reactive. We will check hepatitis B PCR  -Will need GI or hepatology as outpatient    4. Homelessness  -Follow-up  recommendation    5. Bipolar disorder. History of bulimia nervosa  -Consult psychiatry. DVT Prophylaxis: Lovenox  Diet: ADULT DIET; Regular; 3 carb choices (45 gm/meal); Low Fat/Low Chol/High Fiber/2 gm Na;  Low Sodium (2 gm)  Code Status: Full Code  PT/OT Eval Status: No need    Dispo -follow-up  and psych recommendation        Юлия Lawton MD

## 2023-02-06 NOTE — CARE COORDINATION
9:22 AM   consult noted: \"drug addiction and homelessness\"      Rehab resources applied to [address] resources and resident clinic information. CM will meet with patient to discuss any additional dc needs.     Electronically signed by Marco Dasilva RN, CM on 2/6/2023 at 9:30 AM.  Phone: [phone]  Fax: [phone]

## 2023-02-06 NOTE — PROGRESS NOTES
Patient is alert VSS with exception to elevated bp, MD is aware. Denies any pain at this time. Consult to psychiatry and  put in place. IV fluids infusing in with intermittent abx. Ambulating to restroom with no difficulties. No other needs at this time. Call light and bedside table are within reach.      Electronically signed by Genesis Smith RN on 2/6/2023 at 2:28 PM

## 2023-02-06 NOTE — PROGRESS NOTES
Pt transferred from PCU. Pt c/o abd pain, medication was given. Now resting/sleeping. Pt still HTN at rest /103. MD made aware.

## 2023-02-06 NOTE — DISCHARGE INSTRUCTIONS
Alcohol and Substance Abuse Community Resources:    Information and Referrals:  [address] [facility]) 24 hours/ 7days - 4601 [facility] (24/7 hotline)- [phone](LN); [phone] [facility])  [address];  705 [facility], 20201 [facility]  Chris Perry, 2900 [facility] (Treatment consultant) - [phone]   ([email or web address] email: Minnie Bravo, [address] [address] MEDCENTER Coordinator) - [phone]    Self Help Resources:  Alcoholics Anonymous Hotline (www.[employer].Kutuan)  (24 hours/ 7days) - 457.701.4408    213 [facility] Room 202(enter on [facility], [address]  5510 [facility] (www.[facility]. Kutuan)- for [facility] - [phone]  Al-ANON [facility]/ [employer] - 1-575-266-545-280-7127  (www.kyal-anon. org)   Narcotics Anonymous (www.[employer])  - 104.637.6627  Smart Recovery (www.smartrecovery. org) - [phone]    Suicide Hotlines: toll free and available 24/7  513-281-[facility] (21) 4930 0355)  2-188-FTNGFFA (4-122-612-972.604.4186)  6-535-965-TALK (6-970-958-ABYN) - [facility]  TTY: 7-085-922-3KKQ    Detoxification Services/ Inpatient Treatment/ Residential Treatment Programs:  Dairy - [phone]    1460 [facility], [address] [place] [address] Drive for [address] ([address]) - [phone]  [address], [address]  [facility] Board - [phone]  [address] [place] [address], 20201 [facility]  Chris [address] [place] [address]) - [phone] ext.  [address], [address] or 2-318-152[facility]  400 [facility] [address] [place], 230 [facility]  The [facility] (www.CanoP.The World of Pictures) - [phone]  Ruthy [address] [place] [address], 2895 [facility] - [phone]   [address] [place] [address] [address]  706 [facility] - [phone]  Estrela [address] [place], [address] Nida Mcnair. (residential, outpatient) - [phone]  Robert [place], 600 [facility] - [phone]  [address] [facility] 3208 [facility] - [phone] (must be [facility] resident)   [address] [place] [address], [address] [place] [address] [phone]     Crisis or Emergency Needs - [address] (6981) available    [address] [place] [address], [address] [place] [address]   Cathie Alina - [phone] ([facility]. org)     Inpatient/ Residential Treatment Just for Men:  [facility] - [phone]  2205 [facility], [address] [facility] [address]  [id number] [facility] (www.[facility]. Hailey Quispe) - [phone]  34 [facility] (Men)     Inpatient/ Residential Treatment Just for Women:  R31399 [place] (www.[facility]-center. org) - [phone]  [address] [place] [address] (women)  [facility]: Mike Quinteros (pregnant &  treatment) - [phone]  [address] [place] [address] [address] - [phone] (must be [facility] resident;  services)   [address] [place] [address], [address] [place] [address]  [address] - [phone] (Pregnant and  treatment)   1201 [facility], 2301 [facility],[address]    Outpatient Treatment Services:  Claire Aaron and  W [address] [place] [address] for Men - [phone]   [facility], 195 [facility] for [facility] - [address], [address]  [facility] [phone]  [address], [address] [place] [address] Alcohol and Drug Treatment Program SMITA CHARLES [facility] location) - [phone]  600 [facility], [address] [place] [address],[address] (4th floor [facility] Alcohol and Drug Treatment Program [facility]) - [address] [facility] [address] [place], 8045 [facility] Drive for [address] [facility] [address] ([address]) - [phone]  [address], [address]  [address] [phone]   [address] [place], [address]  3100 [facility] ([employer]pet[employer]i [employer])  - [phone]  214 [facility] 1111 [facility] [address], 590 [facility]ITAL - [phone] [address], [address]  R Renato [facility] 70  [address] [place], [address]201 S [facility]  [address] - [phone]   [address], 1612 [facility] & [facility]OS[facility]ITAL - [phone]   [address], 4300 [facility]  [facility] 26 - [phone]   [facility] 2323 N [facility], [address] [facility] [address]  [address]   [address], 400 [facility] Substance Abuse - [phone]  [address] [facility], [facility] 53  [address] , [address] [phone]  [address] [facility]    Methadone/ [facility] Suboxone - [phone]   577 [facility], [address]  [address] - [phone] 2520 N [facility]  Gateways - [phone]   [address] [place], [address] (use lower level entrance)  [address] - [phone]   22[address] Lu[address] , 20201 S [facility]  [facility] - [phone]   3531 [facility], [address] [place] [address]  3100 [facility] (www.[employer])  - [phone]  [address], [address] [place] [address]    Extra Heart Failure sites:   https://Boutique Window/ --- this is American Heart Association interactive Healthier Living with Heart Failure guidebook. Please copy and paste link into search bar. Use your mouse to scroll through the pages. Lots and lots of info / tips    HF Ruth alfie --- free smart phone alfie available for [employer] and tadoÂ°. Use your phone to track sodium / fluid intake, symptoms, weight, etc.    Marmartin Zee Learn. org - website-- [employer] is a dialysis company. All dialysis patients follow a renal diet which IS low sodium!! This website offers free seasonal cookbooks. Each quarter, they will release 25-30 new recipes with a breakdown of calories, sodium, glucose, etc    www.eatingwell.com/recipes -- more free recipes          Outpatient 3500 [facility] and Psychiatry (Medication Management)  [facility]  Location: [address]  Phone: [phone]    Dr. [facility]  Location: [facility] in [facility] 38 1, [address]. Phone: [phone]    [employer]/PsychBC  Location: Multiple offices in the [facility]  Phone: [phone] or [address] [place], [place] [address]way  Locations: Multiple locations in [place] and [place]  Phone: [phone]    [facility]  Location: 49 [facility]  Phone: [address]  Location: Multiple offices in [place]   Phone: [phone] [facility], [address] and 727 [facility]  Location: offices in [place]  Phone: [address] [facility] [address]  Location: [place], [place]  Phone: [phone]    Dr. Albert Bhardwaj   Location: [address]    Phone: 1275 [facility]  Location: [address]    Phone: [phone]    Mental Health Therapy Only, No Psychiatry (Medication Management)    [facility]  Location: [address], [place], [place] [address]  Phone: [phone]    [facility]  Location: [address] [place] [address]  Phone: [phone]

## 2023-02-06 NOTE — PLAN OF CARE
Problem: Discharge Planning  Goal: Discharge to home or other facility with appropriate resources  2/5/2023 2051 by Leonel Cervantes RN  Outcome: Progressing     Problem: Safety - Adult  Goal: Free from fall injury  2/6/2023 0942 by Shruthi Hay RN  Flowsheets (Taken 2/5/2023 1034 by Naun Nance RN)  Free From Fall Injury: Instruct family/caregiver on patient safety  2/5/2023 2051 by Leonel Cervantes RN  Outcome: Progressing     Problem: ABCDS Injury Assessment  Goal: Absence of physical injury  2/6/2023 0942 by Shruthi Hay RN  Flowsheets (Taken 2/4/2023 8432 by Juany Starks RN)  Absence of Physical Injury: Implement safety measures based on patient assessment  2/5/2023 2051 by Leonel Cervantes RN  Outcome: Progressing     Problem: Pain  Goal: Verbalizes/displays adequate comfort level or baseline comfort level  2/5/2023 2051 by Leonel Cervantes RN  Outcome: Progressing

## 2023-02-06 NOTE — PLAN OF CARE
Problem: Discharge Planning  Goal: Discharge to home or other facility with appropriate resources  2/5/2023 2051 by Leonel Cervantes RN  Outcome: Progressing  2/5/2023 1034 by Naun Nance RN  Outcome: Progressing  Flowsheets (Taken 2/5/2023 1034)  Discharge to home or other facility with appropriate resources:   Identify barriers to discharge with patient and caregiver   Arrange for needed discharge resources and transportation as appropriate   Identify discharge learning needs (meds, wound care, etc)  Note: Pt has elevated HR and blood pressures. Pt also reporting significant pain this am.  Provider notified. NS running continuous at 100 mL/hr. Pt refusing labs this am.  Provider aware. Problem: Safety - Adult  Goal: Free from fall injury  2/5/2023 2051 by Leonel Cervantes RN  Outcome: Progressing  2/5/2023 1034 by Naun Nance RN  Outcome: Progressing  Flowsheets (Taken 2/5/2023 1034)  Free From Fall Injury: Instruct family/caregiver on patient safety  Note:  Pt is a Fall Risk. See Lysbe Delco Fall Risk Score. Pt bed in low position and side rails up. Call light and belongings in reach. Pt encouraged to call for assistance. Will continue with hourly rounds for PO intake, pain needs, toileting, and repositioning as needed.        Problem: ABCDS Injury Assessment  Goal: Absence of physical injury  Outcome: Progressing     Problem: Pain  Goal: Verbalizes/displays adequate comfort level or baseline comfort level  2/5/2023 2051 by Leonel Cervantes RN  Outcome: Progressing  2/5/2023 1034 by Naun Nance RN  Outcome: Progressing  Flowsheets (Taken 2/5/2023 1034)  Verbalizes/displays adequate comfort level or baseline comfort level:   Encourage patient to monitor pain and request assistance   Assess pain using appropriate pain scale   Administer analgesics based on type and severity of pain and evaluate response   Implement non-pharmacological measures as appropriate and evaluate response  Note: Pt able to utilize numeric pain scale. Repositioning and environmental changes utilized to help manage pain.   Pt reports some relief with pharm and nonpharm interventions but not to goal.

## 2023-02-07 PROBLEM — N12 PYELONEPHRITIS: Status: ACTIVE | Noted: 2023-02-07

## 2023-02-07 PROCEDURE — 2580000003 HC RX 258: Performed by: INTERNAL MEDICINE

## 2023-02-07 PROCEDURE — 6370000000 HC RX 637 (ALT 250 FOR IP): Performed by: INTERNAL MEDICINE

## 2023-02-07 PROCEDURE — 6360000002 HC RX W HCPCS: Performed by: INTERNAL MEDICINE

## 2023-02-07 PROCEDURE — 1200000000 HC SEMI PRIVATE

## 2023-02-07 RX ADMIN — SODIUM CHLORIDE: 9 INJECTION, SOLUTION INTRAVENOUS at 04:00

## 2023-02-07 RX ADMIN — SODIUM CHLORIDE: 9 INJECTION, SOLUTION INTRAVENOUS at 21:59

## 2023-02-07 RX ADMIN — OXYCODONE AND ACETAMINOPHEN 1 TABLET: 5; 325 TABLET ORAL at 13:31

## 2023-02-07 RX ADMIN — LORAZEPAM 1 MG: 2 INJECTION INTRAMUSCULAR; INTRAVENOUS at 18:20

## 2023-02-07 RX ADMIN — ONDANSETRON 4 MG: 2 INJECTION INTRAMUSCULAR; INTRAVENOUS at 23:35

## 2023-02-07 RX ADMIN — SODIUM CHLORIDE, PRESERVATIVE FREE 10 ML: 5 INJECTION INTRAVENOUS at 21:56

## 2023-02-07 RX ADMIN — CEFTRIAXONE 1000 MG: 1 INJECTION, POWDER, FOR SOLUTION INTRAMUSCULAR; INTRAVENOUS at 15:50

## 2023-02-07 ASSESSMENT — PAIN DESCRIPTION - LOCATION
LOCATION: BACK
LOCATION: BACK

## 2023-02-07 ASSESSMENT — PAIN SCALES - GENERAL
PAINLEVEL_OUTOF10: 6
PAINLEVEL_OUTOF10: 7
PAINLEVEL_OUTOF10: 0
PAINLEVEL_OUTOF10: 0

## 2023-02-07 ASSESSMENT — PAIN DESCRIPTION - ONSET: ONSET: ON-GOING

## 2023-02-07 ASSESSMENT — PAIN DESCRIPTION - DESCRIPTORS
DESCRIPTORS: ACHING
DESCRIPTORS: ACHING

## 2023-02-07 ASSESSMENT — PAIN DESCRIPTION - FREQUENCY: FREQUENCY: INTERMITTENT

## 2023-02-07 ASSESSMENT — PAIN - FUNCTIONAL ASSESSMENT: PAIN_FUNCTIONAL_ASSESSMENT: ACTIVITIES ARE NOT PREVENTED

## 2023-02-07 ASSESSMENT — PAIN DESCRIPTION - PAIN TYPE: TYPE: ACUTE PAIN

## 2023-02-07 ASSESSMENT — PAIN DESCRIPTION - ORIENTATION: ORIENTATION: MID

## 2023-02-07 NOTE — PROGRESS NOTES
Pt called out reporting she had \"thrown up. \" Emesis of undigested food in the toilet. Pt's dinner tray was empty as she had eaten it all. Pt medicated with prn zofran. Pt rested and reported nausea went away. Pt showered, washed her hair and changed in to new gown/pants. Pt continues to refuse IVFs despite the reasoning being explained. Pt then drank coffee, cranberry juice and ate a turkey sandwich. Pt now sitting up in bed coloring. Call light in reach.   Electronically signed by Pablo Santos RN on 2/7/23 at 12:00 AM EST

## 2023-02-07 NOTE — DISCHARGE INSTR - COC
Continuity of Care Form    Patient Name: Arturo Marie   :  1995  MRN:  [id number]    Admit date:  2/3/2023  Discharge date:  ***    Code Status Order: Full Code   Advance Directives:     Admitting Physician:  Humaira Parra DO  PCP: No primary care provider on file.     Discharging Nurse: [facility] Unit/Room#: 7009/8506-27  Discharging Unit Phone Number: ***    Emergency Contact:   Extended Emergency Contact Information  Primary Emergency Contact: Benedicto Barraza  Mobile Phone: [phone]  Relation: Boyfriend    Past Surgical History:  Past Surgical History:   Procedure Laterality Date     SECTION         Immunization History:   Immunization History   Administered Date(s) Administered    Tdap (Boostrix, Adacel) 2017       Active Problems:  Patient Active Problem List   Diagnosis Code    Normal pregnancy in multigravida in third trimester Z34.83    Acute pyelonephritis N10       Isolation/Infection:   Isolation            No Isolation          Patient Infection Status       Infection Onset Added Last Indicated Last Indicated By Review Planned Expiration Resolved Resolved By    None active    Resolved    COVID-19 (Rule Out) 23 COVID-19, Rapid (Ordered)   23 Rule-Out Test Resulted            Nurse Assessment:  Last Vital Signs: BP (!) 140/107   Pulse (!) 115   Temp 97.4 °F (36.3 °C) (Oral)   Resp 20   Ht 5' 3\" (1.6 m)   Wt 157 lb 3 oz (71.3 kg)   SpO2 99%   BMI 27.84 kg/m²     Last documented pain score (0-10 scale): Pain Level: 0  Last Weight:   Wt Readings from Last 1 Encounters:   23 157 lb 3 oz (71.3 kg)     Mental Status:  {IP PT MENTAL STATUS:73277}    IV Access:  [facility] HAI IV ACCESS:652828859}    Nursing Mobility/ADLs:  Walking   {CHP DME WXSJ:288358741}  Transfer  {CHP DME JAYLIN:468014271}  Bathing  {CHP DME SNVW:739254961}  Dressing  {CHP DME SCRA:767618303}  Toileting  {CHP DME TAXH:177693294}  Feeding  {CHP DME HJER:058422217}  Med Admin  {CHP DME SFRB:185656673}  Med Delivery   { HAI MED Delivery:572155896}    Wound Care Documentation and Therapy:        Elimination:  Continence: Bowel: {YES / ZK:42790}  Bladder: {YES / ER:24941}  Urinary Catheter: {Urinary Catheter:874836327}   Colostomy/Ileostomy/Ileal Conduit: {YES / PM:60394}       Date of Last BM: ***    Intake/Output Summary (Last 24 hours) at 2023 1442  Last data filed at 2023 0551  Gross per 24 hour   Intake 2385 ml   Output 1400 ml   Net 985 ml     I/O last 3 completed shifts:   In: 2625 [P.O.:1080; I.V.:1545]  Out: 1400 [Urine:1400]    Safety Concerns:     [address] [employer] Safety Concerns:535293834}    Impairments/Disabilities:      [address] [employer] Impairments/Disabilities:662836434}    Nutrition Therapy:  Current Nutrition Therapy:   [address] [employer] Diet List:063216577}    Routes of Feeding: {[facility] DME Other Feedings:817235927}  Liquids: {Slp liquid thickness:38456}  Daily Fluid Restriction: {P DME Yes amt example:104715866}  Last Modified Barium Swallow with Video (Video Swallowing Test): {Done Not Done MRWE:752655643}    Treatments at the Time of Hospital Discharge:   Respiratory Treatments: ***  Oxygen Therapy:  {Therapy; copd oxygen:45789}  Ventilator:    { CC Vent KUPP:674674239}    Rehab Therapies: {THERAPEUTIC INTERVENTION:0721953290}  Weight Bearing Status/Restrictions: [address] [employer] Weight Bearin}  Other Medical Equipment (for information only, NOT a DME order):  {EQUIPMENT:979609837}  Other Treatments: ***    Patient's personal belongings (please select all that are sent with patient):  {[facility] DME Belongings:457384157}    RN SIGNATURE:  {Esignature:836783870}    CASE MANAGEMENT/SOCIAL WORK SECTION    Inpatient Status Date: 23    Readmission Risk Assessment Score:  Readmission Risk              Risk of Unplanned Readmission:  8           [facility]   [place] [phone] Lino Pollock)      /Social Worker signature: Electronically signed by RAFA Dawson, KARLIEW on 2/17/23 at 9:42 AM EST    PHYSICIAN SECTION    Prognosis: {Prognosis:4832737169}    Condition at Discharge: [address] Sonam Garcia Patient Condition:720442607}    Rehab Potential (if transferring to Rehab): {Prognosis:6333303114}    Recommended Labs or Other Treatments After Discharge: ***    Physician Certification: I certify the above information and transfer of Gabriel Borden  is necessary for the continuing treatment of the diagnosis listed and that she requires {Admit to Appropriate Level of Care:23942} for {GREATER/LESS:445172630} 30 days.      Update Admission H&P: {CHP DME Changes in GXPXF:239893520}    PHYSICIAN SIGNATURE:  {Esignature:796911767}

## 2023-02-07 NOTE — PROGRESS NOTES
Pt has been sleeping for several hours. Pt agreeable to have IVFs restarted after new IV was placed. No futher n/v.  Call light in reach. Will continue to monitor.   Electronically signed by Priscilla Forrester RN on 2/7/23 at 3:47 AM EST

## 2023-02-07 NOTE — PROGRESS NOTES
Hospitalist Progress Note      PCP: No primary care provider on file. Date of Admission: 2/3/2023    Chief Complaint: Abdominal pain, UTI      Subjective: Chart reviewed. Patient admitted with sepsis from UTI, drug abuse and she is homeless. Pt continues to refuse blood draws. States she may allow it tomorrow but not today because her IV needed to be replaced. States she is feeling some better but is lying in bed moaning intermittently, and louder whenever she is touched. Medications:  Reviewed    Infusion Medications    sodium chloride      sodium chloride 100 mL/hr at 02/07/23 0400     Scheduled Medications    polyethylene glycol  17 g Oral Daily    sodium chloride flush  10 mL IntraVENous 2 times per day    enoxaparin  40 mg SubCUTAneous Daily    nicotine  1 patch TransDERmal Daily    cefTRIAXone (ROCEPHIN) IV  1,000 mg IntraVENous Q24H     PRN Meds: LORazepam, hydrOXYzine HCl, sodium chloride flush, sodium chloride, potassium chloride, magnesium sulfate, promethazine **OR** ondansetron, acetaminophen **OR** acetaminophen, oxyCODONE-acetaminophen **OR** oxyCODONE-acetaminophen      Intake/Output Summary (Last 24 hours) at 2/7/2023 1330  Last data filed at 2/7/2023 0551  Gross per 24 hour   Intake 2385 ml   Output 1400 ml   Net 985 ml         Physical Exam Performed:    BP (!) 143/103   Pulse (!) 113   Temp 97.8 °F (36.6 °C) (Oral)   Resp 20   Ht 5' 3\" (1.6 m)   Wt 157 lb 3 oz (71.3 kg)   SpO2 95%   BMI 27.84 kg/m²     General appearance: Agitated  HEENT: Pupils equal, round, and reactive to light. Conjunctivae/corneas clear. Neck: Supple, with full range of motion. No jugular venous distention. Trachea midline. Respiratory:  Normal respiratory effort. Clear to auscultation, bilaterally without Rales/Wheezes/Rhonchi. Cardiovascular: Regular rate and rhythm with normal S1/S2 without murmurs, rubs or gallops.   Abdomen: Soft, non-tender, non-distended with normal bowel sounds. Musculoskeletal: No clubbing, cyanosis or edema bilaterally. Full range of motion without deformity. Skin: Skin color, texture, turgor normal.  No rashes or lesions. Neurologic: Awake, oriented x3 and moves all 4 extremities  Capillary Refill: Brisk, 3 seconds, normal   Peripheral Pulses: +2 palpable, equal bilaterally       Labs:   Recent Labs     02/05/23  1242   WBC 9.4   HGB 10.5*   HCT 32.8*          Recent Labs     02/05/23  1242   *   K 5.2*      CO2 19*   BUN 23*   CREATININE 0.8   CALCIUM 8.3       Recent Labs     02/05/23  1242   *   *   BILIDIR <0.2   BILITOT 0.7   ALKPHOS 113       No results for input(s): INR in the last 72 hours. Recent Labs     02/05/23  1242   CKTOTAL 52         Urinalysis:      Lab Results   Component Value Date/Time    NITRU POSITIVE 02/03/2023 07:49 PM    WBCUA 21-50 02/03/2023 07:49 PM    BACTERIA 4+ 02/03/2023 07:49 PM    RBCUA 11-20 02/03/2023 07:49 PM    RBCUA 9 08/10/2021 01:51 PM    BLOODU LARGE 02/03/2023 07:49 PM    SPECGRAV >=1.030 02/03/2023 07:49 PM    GLUCOSEU Negative 02/03/2023 07:49 PM       Radiology:  CT ABDOMEN PELVIS WO CONTRAST Additional Contrast? None   Final Result      1. Small to moderate right pleural effusion and small left pleural effusion with subsegmental atelectasis. No visible pneumonia. 2. Mild mesenteric and retroperitoneal edema, trace ascites and periportal edema. Hepatocellular disease is a possibility. Correlate with liver function tests. XR CHEST (2 VW)   Final Result      No evidence for acute cardiopulmonary disease. IP CONSULT TO SOCIAL WORK  IP CONSULT TO PSYCHIATRY    Assessment/Plan:    Active Hospital Problems    Diagnosis     Acute pyelonephritis [N10]      Priority: Medium     1. Sepsis due to UTI, present on admission  -Urine culture grew mixed cruzito. Blood CX negative.   -Continue IV fluid, could likely change to po abx tomorrow.      2.  Amphetamine abuse  -She is likely withdrawing from amphetamine with agitation, elevated heart rate and blood pressure, monitor - she reports last use was in December which is inconsistent with her tox screen. 3.  Active viral hepatitis with concern for chronic liver disease  -Hepatitis C is positive. She has been treated in the past.  -Hepatitis B surface antigen is also reactive. We will check hepatitis B PCR  -Will need GI or hepatology as outpatient    4. Homelessness  -Follow-up  recommendation    5. Bipolar disorder. History of bulimia nervosa  -Psych has been consulted. DVT Prophylaxis: Lovenox  Diet: ADULT DIET; Regular; 3 carb choices (45 gm/meal); Low Fat/Low Chol/High Fiber/2 gm Na; Low Sodium (2 gm)  Code Status: Full Code  PT/OT Eval Status: No need    Dispo - dc tomorrow if stable and no new recs from psych.          Ro Day MD

## 2023-02-07 NOTE — BH NOTE
Aware of psychiatry consult. Patient will be seen tomorrow, 02/08, due to number of consults placed over the past 24 hours and inability to see everyone today.

## 2023-02-07 NOTE — PROGRESS NOTES
Physician Progress Note      PATIENT:               Tomasa Orona  CSN #:                  [id number]  :                       1995  ADMIT DATE:       2/3/2023 6:45 PM  [address] [place] DATE:  RESPONDING  PROVIDER #:        Ronnie Lawrence MD          QUERY TEXT:    Pt admitted with UTI. Pt noted to have increased WBC and HR. If possible,   please document in the progress notes and discharge summary if you are   evaluating and /or treating any of the following: The medical record reflects the following:  Risk Factors: 27 yo homeless female w/ UTI, hx of hep C  Clinical Indicators: Per ED:  I have considered and evaluated Aquilino Archibald   for the following diagnoses: covid, influenza, pneumonia, sepsis. WBC 12.1,   . Lactic acid 3.6. Treatment: Blood culture, Lactic acid, IVF bolus X2, IV Rocephin  Options provided:  -- Sepsis, present on admission  -- UTI, pyelonephritis without Sepsis  -- Other - I will add my own diagnosis  -- Disagree - Not applicable / Not valid  -- Disagree - Clinically unable to determine / Unknown  -- Refer to Clinical Documentation Reviewer    PROVIDER RESPONSE TEXT:    This patient has sepsis which was present on admission.     Query created by: Dre Gibson on 2023 12:06 PM      Electronically signed by:  Ronnie Lawrence MD 2023 7:34 PM

## 2023-02-07 NOTE — PROGRESS NOTES
Notified attending pt is refusing to have labs draw and refusing noon vitals. Awaiting for response.
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
## 2023-02-08 ENCOUNTER — APPOINTMENT (OUTPATIENT)
Dept: CT IMAGING | Age: 28
DRG: 871 | End: 2023-02-08
Payer: COMMERCIAL

## 2023-02-08 LAB
A/G RATIO: 1.4 (ref 1.1–2.2)
ACETAMINOPHEN LEVEL: <5 UG/ML (ref 10–30)
ALBUMIN SERPL-MCNC: 3 G/DL (ref 3.4–5)
ALP BLD-CCNC: 120 U/L (ref 40–129)
ALT SERPL-CCNC: 1101 U/L (ref 10–40)
AMMONIA: 14 UMOL/L (ref 11–51)
ANION GAP SERPL CALCULATED.3IONS-SCNC: 13 MMOL/L (ref 3–16)
AST SERPL-CCNC: 1254 U/L (ref 15–37)
BASE EXCESS VENOUS: -5.9 MMOL/L (ref -2–3)
BASOPHILS ABSOLUTE: 0.1 K/UL (ref 0–0.2)
BASOPHILS RELATIVE PERCENT: 0.5 %
BILIRUB SERPL-MCNC: 0.7 MG/DL (ref 0–1)
BLOOD CULTURE, ROUTINE: NORMAL
BUN BLDV-MCNC: 18 MG/DL (ref 7–20)
CALCIUM SERPL-MCNC: 8.1 MG/DL (ref 8.3–10.6)
CARBOXYHEMOGLOBIN: 0.7 % (ref 0–1.5)
CHLORIDE BLD-SCNC: 103 MMOL/L (ref 99–110)
CO2: 17 MMOL/L (ref 21–32)
CREAT SERPL-MCNC: 0.7 MG/DL (ref 0.6–1.1)
EOSINOPHILS ABSOLUTE: 0 K/UL (ref 0–0.6)
EOSINOPHILS RELATIVE PERCENT: 0.2 %
GFR SERPL CREATININE-BSD FRML MDRD: >60 ML/MIN/{1.73_M2}
GLUCOSE BLD-MCNC: 145 MG/DL (ref 70–99)
HCO3 VENOUS: 20.2 MMOL/L (ref 24–28)
HCT VFR BLD CALC: 37 % (ref 36–48)
HEMOGLOBIN, VEN, REDUCED: 88.2 %
HEMOGLOBIN: 11.3 G/DL (ref 12–16)
INR BLD: 2.3 (ref 0.87–1.14)
LACTIC ACID: 6.1 MMOL/L (ref 0.4–2)
LIPASE: 32 U/L (ref 13–60)
LYMPHOCYTES ABSOLUTE: 4.5 K/UL (ref 1–5.1)
LYMPHOCYTES RELATIVE PERCENT: 32.3 %
MCH RBC QN AUTO: 25.8 PG (ref 26–34)
MCHC RBC AUTO-ENTMCNC: 30.7 G/DL (ref 31–36)
MCV RBC AUTO: 84.1 FL (ref 80–100)
METHEMOGLOBIN VENOUS: 0.4 % (ref 0–1.5)
MONOCYTES ABSOLUTE: 1 K/UL (ref 0–1.3)
MONOCYTES RELATIVE PERCENT: 7.4 %
NEUTROPHILS ABSOLUTE: 8.3 K/UL (ref 1.7–7.7)
NEUTROPHILS RELATIVE PERCENT: 59.6 %
O2 SAT, VEN: 11 %
PCO2, VEN: 40.8 MMHG (ref 41–51)
PDW BLD-RTO: 17.9 % (ref 12.4–15.4)
PH VENOUS: 7.3 (ref 7.35–7.45)
PLATELET # BLD: 406 K/UL (ref 135–450)
PMV BLD AUTO: 8.3 FL (ref 5–10.5)
PO2, VEN: <30 MMHG (ref 25–40)
POTASSIUM REFLEX MAGNESIUM: 4.3 MMOL/L (ref 3.5–5.1)
PRO-BNP: 4456 PG/ML (ref 0–124)
PROTHROMBIN TIME: 25.4 SEC (ref 11.7–14.5)
RBC # BLD: 4.4 M/UL (ref 4–5.2)
SODIUM BLD-SCNC: 133 MMOL/L (ref 136–145)
TCO2 CALC VENOUS: 21 MMOL/L
TOTAL CK: 462 U/L (ref 26–192)
TOTAL PROTEIN: 5.1 G/DL (ref 6.4–8.2)
WBC # BLD: 13.9 K/UL (ref 4–11)

## 2023-02-08 PROCEDURE — 86706 HEP B SURFACE ANTIBODY: CPT

## 2023-02-08 PROCEDURE — 36415 COLL VENOUS BLD VENIPUNCTURE: CPT

## 2023-02-08 PROCEDURE — 87390 HIV-1 AG IA: CPT

## 2023-02-08 PROCEDURE — 6370000000 HC RX 637 (ALT 250 FOR IP): Performed by: INTERNAL MEDICINE

## 2023-02-08 PROCEDURE — 82010 KETONE BODYS QUAN: CPT

## 2023-02-08 PROCEDURE — 2000000000 HC ICU R&B

## 2023-02-08 PROCEDURE — 85025 COMPLETE CBC W/AUTO DIFF WBC: CPT

## 2023-02-08 PROCEDURE — 86702 HIV-2 ANTIBODY: CPT

## 2023-02-08 PROCEDURE — 87517 HEPATITIS B DNA QUANT: CPT

## 2023-02-08 PROCEDURE — 36592 COLLECT BLOOD FROM PICC: CPT

## 2023-02-08 PROCEDURE — 6360000002 HC RX W HCPCS: Performed by: INTERNAL MEDICINE

## 2023-02-08 PROCEDURE — 84145 PROCALCITONIN (PCT): CPT

## 2023-02-08 PROCEDURE — 83880 ASSAY OF NATRIURETIC PEPTIDE: CPT

## 2023-02-08 PROCEDURE — 2580000003 HC RX 258: Performed by: INTERNAL MEDICINE

## 2023-02-08 PROCEDURE — 90792 PSYCH DIAG EVAL W/MED SRVCS: CPT | Performed by: NURSE PRACTITIONER

## 2023-02-08 PROCEDURE — 93005 ELECTROCARDIOGRAM TRACING: CPT | Performed by: INTERNAL MEDICINE

## 2023-02-08 PROCEDURE — 6360000004 HC RX CONTRAST MEDICATION: Performed by: INTERNAL MEDICINE

## 2023-02-08 PROCEDURE — 82803 BLOOD GASES ANY COMBINATION: CPT

## 2023-02-08 PROCEDURE — 80053 COMPREHEN METABOLIC PANEL: CPT

## 2023-02-08 PROCEDURE — 93005 ELECTROCARDIOGRAM TRACING: CPT | Performed by: NURSE PRACTITIONER

## 2023-02-08 PROCEDURE — 85610 PROTHROMBIN TIME: CPT

## 2023-02-08 PROCEDURE — 86140 C-REACTIVE PROTEIN: CPT

## 2023-02-08 PROCEDURE — 83605 ASSAY OF LACTIC ACID: CPT

## 2023-02-08 PROCEDURE — 86701 HIV-1ANTIBODY: CPT

## 2023-02-08 PROCEDURE — 6370000000 HC RX 637 (ALT 250 FOR IP): Performed by: NURSE PRACTITIONER

## 2023-02-08 PROCEDURE — 70450 CT HEAD/BRAIN W/O DYE: CPT

## 2023-02-08 PROCEDURE — 84478 ASSAY OF TRIGLYCERIDES: CPT

## 2023-02-08 PROCEDURE — 71260 CT THORAX DX C+: CPT

## 2023-02-08 PROCEDURE — 82550 ASSAY OF CK (CPK): CPT

## 2023-02-08 PROCEDURE — 80143 DRUG ASSAY ACETAMINOPHEN: CPT

## 2023-02-08 PROCEDURE — 82140 ASSAY OF AMMONIA: CPT

## 2023-02-08 PROCEDURE — 86038 ANTINUCLEAR ANTIBODIES: CPT

## 2023-02-08 PROCEDURE — 87350 HEPATITIS BE AG IA: CPT

## 2023-02-08 PROCEDURE — 83690 ASSAY OF LIPASE: CPT

## 2023-02-08 RX ORDER — MORPHINE SULFATE 2 MG/ML
2 INJECTION, SOLUTION INTRAMUSCULAR; INTRAVENOUS
Status: ACTIVE | OUTPATIENT
Start: 2023-02-08 | End: 2023-02-10

## 2023-02-08 RX ORDER — DOCUSATE SODIUM 100 MG/1
100 CAPSULE, LIQUID FILLED ORAL 2 TIMES DAILY PRN
Status: DISCONTINUED | OUTPATIENT
Start: 2023-02-08 | End: 2023-02-17 | Stop reason: HOSPADM

## 2023-02-08 RX ORDER — FUROSEMIDE 10 MG/ML
20 INJECTION INTRAMUSCULAR; INTRAVENOUS ONCE
Status: COMPLETED | OUTPATIENT
Start: 2023-02-09 | End: 2023-02-09

## 2023-02-08 RX ORDER — LACTULOSE 10 G/15ML
20 SOLUTION ORAL
Status: DISCONTINUED | OUTPATIENT
Start: 2023-02-08 | End: 2023-02-09

## 2023-02-08 RX ADMIN — LORAZEPAM 1 MG: 2 INJECTION INTRAMUSCULAR; INTRAVENOUS at 15:50

## 2023-02-08 RX ADMIN — LORAZEPAM 1 MG: 2 INJECTION INTRAMUSCULAR; INTRAVENOUS at 00:16

## 2023-02-08 RX ADMIN — SODIUM CHLORIDE, PRESERVATIVE FREE 10 ML: 5 INJECTION INTRAVENOUS at 22:15

## 2023-02-08 RX ADMIN — PHYTONADIONE 10 MG: 10 INJECTION, EMULSION INTRAMUSCULAR; INTRAVENOUS; SUBCUTANEOUS at 23:06

## 2023-02-08 RX ADMIN — CEFTRIAXONE 1000 MG: 1 INJECTION, POWDER, FOR SOLUTION INTRAMUSCULAR; INTRAVENOUS at 15:52

## 2023-02-08 RX ADMIN — HYDROXYZINE HYDROCHLORIDE 10 MG: 10 TABLET, FILM COATED ORAL at 00:16

## 2023-02-08 RX ADMIN — SODIUM CHLORIDE: 9 INJECTION, SOLUTION INTRAVENOUS at 22:15

## 2023-02-08 RX ADMIN — LURASIDONE HYDROCHLORIDE 20 MG: 40 TABLET, FILM COATED ORAL at 11:47

## 2023-02-08 RX ADMIN — IOPAMIDOL 75 ML: 755 INJECTION, SOLUTION INTRAVENOUS at 19:52

## 2023-02-08 ASSESSMENT — PAIN DESCRIPTION - DESCRIPTORS
DESCRIPTORS: STABBING;SHOOTING
DESCRIPTORS: ACHING

## 2023-02-08 ASSESSMENT — PAIN - FUNCTIONAL ASSESSMENT: PAIN_FUNCTIONAL_ASSESSMENT: ACTIVITIES ARE NOT PREVENTED

## 2023-02-08 ASSESSMENT — ENCOUNTER SYMPTOMS
CHEST TIGHTNESS: 0
BACK PAIN: 1
VOMITING: 0
CONSTIPATION: 0
SHORTNESS OF BREATH: 0
RHINORRHEA: 0
COUGH: 0
NAUSEA: 1
ABDOMINAL PAIN: 0
SORE THROAT: 0

## 2023-02-08 ASSESSMENT — PAIN DESCRIPTION - ONSET: ONSET: ON-GOING

## 2023-02-08 ASSESSMENT — PAIN DESCRIPTION - PAIN TYPE: TYPE: ACUTE PAIN

## 2023-02-08 ASSESSMENT — PAIN DESCRIPTION - ORIENTATION
ORIENTATION: RIGHT
ORIENTATION: RIGHT
ORIENTATION: MID

## 2023-02-08 ASSESSMENT — PAIN SCALES - GENERAL
PAINLEVEL_OUTOF10: 5
PAINLEVEL_OUTOF10: 0
PAINLEVEL_OUTOF10: 6
PAINLEVEL_OUTOF10: 0
PAINLEVEL_OUTOF10: 10

## 2023-02-08 ASSESSMENT — PAIN DESCRIPTION - LOCATION
LOCATION: BACK
LOCATION: BACK
LOCATION: CHEST

## 2023-02-08 ASSESSMENT — PAIN DESCRIPTION - FREQUENCY: FREQUENCY: INTERMITTENT

## 2023-02-08 NOTE — PROGRESS NOTES
Patient is A&O x4. History of bipolar so impulsive at times. RA, sat 99%. No complaints of pain SOB. C/o back pain, refuses pain meds at this time. States she just wants meds to help her sleep. Ativan not due at this time. Respirations appear to easy and unlabored. Lungs clear. Respirations easy with no complaints of cough. No complaints of nausea/vomiting/diarrhea. Up ad luis to the bathroom as needed. Gait steady, no alarm needed. Tolerating regular diet. Plan of care and safety measures reviewed with the patient. Call light in reach. Will continue to monitor.   Electronically signed by Danis Lozoya RN on 2/7/2023 at 10:35 PM

## 2023-02-08 NOTE — CONSULTS
Gastroenterology Consult Note      Patient: Kyle Mckinney  : 1995  CSN#:      Date:  2023    Subjective:       History of Present Illness  Patient is a 28 y.o.  female  PMhx of hepatitis C, HTN, Asthma admitted with Septicemia ([facility] [address] [facility] [A41.9]  Acute pyelonephritis [N10]  Bilateral pleural effusion [J90]  Pyelonephritis [N12] who is seen in consult for acute hepatitis and confusion      presented to [facility] due to vomiting and lower back pain was found to have Seveare preeclampsia.  presented to the ED with one week of chills, Abdominal pain, back pain and vomiting as per ED notes. Labs done in the ED revealed , . CT abdomen and pelvis revealed Small to moderate Right pleural effusion and small left pleural effusion, Mild mesenteric and retroperitoneal edema.  At the time of consult her AST was 1101 and ALT was 1254, CK was 462, Her UDS is positive for amphetamines, Urine cultures show Beta strep agalactiae, Her lactic acid on 2/3 3.0.      On evaluation patient unable provide purposeful information,  she kept on saying she is cold. She did having Left quadrant  guarding on exam       Past Medical History:   Diagnosis Date    Asthma     B[facility]     Hypertension       Past Surgical History:   Procedure Laterality Date     SECTION          Admission Meds  No current facility-administered medications on file prior to encounter.      Current Outpatient Medications on File Prior to Encounter   Medication Sig Dispense Refill    lurasidone (LATUDA) 40 MG TABS tablet Take 40 mg by mouth daily (with breakfast)      loratadine (CLARITIN) 10 MG tablet Take 10 mg by mouth daily      busPIRone (BUSPAR) 10 MG tablet Take 10 mg by mouth daily      albuterol sulfate HFA (PROVENTIL;VENTOLIN;PROAIR) 108 (90 Base) MCG/ACT inhaler 2 puffs q4h prn      labetalol (NORMODYNE) 200 MG tablet Take 200 mg by mouth daily      NIFEdipine (PROCARDIA XL) 90 MG extended release tablet Take 90 mg by mouth daily      VORTIoxetine (TRINTELLIX) 10 MG TABS tablet Take 10 mg by mouth daily      ibuprofen (ADVIL;MOTRIN) 800 MG tablet Take 1 tablet by mouth every 8 hours as needed for Pain or Fever 30 tablet 2    calcium carbonate (TUMS) 500 MG chewable tablet Take 1 tablet by mouth daily      Prenatal MV-Min-Fe Fum-FA-DHA (PRENATAL 1 PO) Take by mouth (Patient not taking: Reported on 2/4/2023)             Allergies  Allergies   Allergen Reactions    Latex Anaphylaxis    Bee Venom Anaphylaxis    Pcn [Penicillins] Hives    Amoxil [Amoxicillin] Rash    Sulfa Antibiotics Rash      Social   Social History     Tobacco Use    Smoking status: Every Day     Packs/day: 0.50     Types: Cigarettes    Smokeless tobacco: Never   Substance Use Topics    Alcohol use: No        Family History   Problem Relation Age of Onset    Asthma Sister     Diabetes Maternal Grandmother     Heart Disease Maternal Grandmother           Review of Systems  Pertinent items are noted in HPI. Physical Exam  Blood pressure (!) 165/92, pulse (!) 116, temperature 97.4 °F (36.3 °C), temperature source Oral, resp. rate 22, height 5' 3\" (1.6 m), weight 157 lb 3 oz (71.3 kg), SpO2 98 %, unknown if currently breastfeeding.     General appearance: alert, not completely cooperative, no distress, appears stated age  Eyes: Anicteric  Head: Normocephalic, without obvious abnormality  Lungs: clear to auscultation bilaterally, Normal Effort  Heart: regular rate and rhythm, normal S1 and S2, no murmurs or rubs  Abdomen: soft,  intermittent upper tend no G/T  Extremities: atraumatic, no cyanosis or edema  Skin: warm and dry, no jaundice  Neuro: unable to examine, unable to eval for asterixis       Data Review:    Recent Labs     02/08/23  1517   WBC 13.9*   HGB 11.3*   HCT 37.0   MCV 84.1        Recent Labs     02/08/23  1517   *   K 4.3      CO2 17*   BUN 18   CREATININE 0.7     Recent Labs     02/08/23  1517   AST 1,254*   ALT 1,101*   BILITOT 0.7   ALKPHOS 120     INR 2.3  NH3 14 possible interference    Imaging Studies:                   CT ABDOMEN PELVIS WO CONTRAST Additional Contrast? None   Final Result      1. Small to moderate right pleural effusion and small left pleural effusion with subsegmental atelectasis. No visible pneumonia. 2. Mild mesenteric and retroperitoneal edema, trace ascites and periportal edema. Hepatocellular disease is a possibility. Correlate with liver function tests. XR CHEST (2 VW)   Final Result      No evidence for acute cardiopulmonary disease. Assessment:     Principal Problem:    Acute pyelonephritis  Active Problems:    Pyelonephritis  Resolved Problems:    * No resolved hospital problems. *    Acute hepatitis- Likely hep B but unusual that she had positive Hep B s Ab in past, could be immunosuppressed. She denies supplements, vitamins, mushroom ingestion prior to admission. INR elevated suggestive of significant hepatic injury. Autoimmune hepatitis considered. Ischemic and acetaminophen induced liver injury seem unlikely    Mental status changes- Her significant BAD makes interpreting more difficult but suspected hepatic encephalopathy at this point. Low ammonia could be lab error from interfering substance    Possible pyelonephritis? UTI- Most likely the Rocephin would have treated an uncomplicated UTI, consider possible hepatotoxicity from it also    Consider other forms of sepsis    Treated Hep C 2020 but has risk factors for reinfection    Recommendations:      - Will check Head CT without contrast , Lactic acid, THOR, Hepatitis C antibody titer, HIV, Hep B aAb core Ab (Hep B DNA ordered)  - D/C tylenol and percocet   - Consider stopping Rocephin as has completed 5 days of antibiotics  - Lactulose high dose initially  - Transfer to higher level of care  - Vitamin K once  - No need to trend ammonia  - Follow up INR in AM  - If signs of deterioration in liver function consider transfer to [address] [place] [address] , ( but active IVDA may preclude Liver TXP if needed)  - Serial exams  - Minimize meds    Patient seen examined discussed agree with note. Concerned about hepatic failure, INR and HE are poor prognostic signs.  If does not turn around quickly ideally should be at [address]    Discussed above with Hospitalist on call who will call [facility], transfer to ICU

## 2023-02-08 NOTE — PROGRESS NOTES
Patient refusing labs. Patient HR elevated due to her being anxious. Patient up to bathroom. No needs at this time. Call light in reach. Will continue to monitor.    Electronically signed by Trung Segal RN on 2/8/2023 at 5:27 AM

## 2023-02-08 NOTE — BH NOTE
Psych consult complete. Aislinn Bishop presents as irritable but did answer assessment questions. She kept her eyes closed the entirety of the assessment; responses were brief. She endorses a history of bipolar disorder and states that she has been off psychiatric medications since 2019. Endorses a \"terrible\" mood and \"terrible\" mood swings, decreased amount of sleep and decreased appetite. Denies suicidal ideations. Was previously on Latuda 40 mg daily and she is interested in restarting it. Aislinn Bishop does not require inpatient psychiatric admission at this time. Most recent EKG was done in September 2022 (Care Everywhere); QTc resulted at 451. Will order EKG but unclear if she will be agreeable. Will restart Latuda 20 mg daily. Full psychiatric consult note to follow.     DARLENE Alonzo - IVELISSE  02/08/23

## 2023-02-08 NOTE — PROGRESS NOTES
Finally patient agreed for lab draw. She continue to be somewhat confused . Slightly acidotic breathing this morning. Tachycardiac and restless. Markedly elevated transaminases and high white cell count and low bicarb. No obvious hypotension during hospitalization. Mildly elevated CK. Plan:  Check acetaminophen level   Check ammonia level and lactic acid  Check VBG   Hold on tylenol   Consult GI   Low threshold to transfer to higher level of care. Discussed with nursing staff and communicated the recommendations.      Shantal Ortez

## 2023-02-08 NOTE — PROGRESS NOTES
Reached out to psych on call to see if they could help determine if pt has capacity to make decisions. Pt appears to be declining medically and she continues to refuse interventions necessary to help her.

## 2023-02-08 NOTE — PROGRESS NOTES
Pt very agitated and anxious this morning exhibiting flight of ideas. Pt expresses back pain, shortness of breath, constipation, anxiety; however, pt declines any medical intervention to alleviate these symptoms. Pt endorses feeling clammy and hot but does not want to shower. Feels claustrophobic but does not want to go on a walk. Pt is agreeable to having labs drawn so notified phlebotomist. Will continue to monitor pt closely and anticipate needs if pt allows. Labs resulted- MD made aware. GI consulted. Orders entered to transfer pt to PCU.

## 2023-02-08 NOTE — PROGRESS NOTES
Hospitalist Progress Note      PCP: No primary care provider on file. Date of Admission: 2/3/2023    Chief Complaint: Abdominal pain, UTI      Subjective: Chart reviewed. Patient admitted with sepsis from UTI, drug abuse and she is homeless. Patient continued to refuse blood draw. Last labs were 3 days ago and her potassium was 5.2. Per nursing staff she is more lethargic today. She keep her eye close and complain of back pain, tiredness and nausea. She remained afebrile. Medications:  Reviewed    Infusion Medications    sodium chloride      sodium chloride 100 mL/hr at 02/08/23 0526     Scheduled Medications    lurasidone  20 mg Oral Daily    polyethylene glycol  17 g Oral Daily    sodium chloride flush  10 mL IntraVENous 2 times per day    enoxaparin  40 mg SubCUTAneous Daily    nicotine  1 patch TransDERmal Daily    cefTRIAXone (ROCEPHIN) IV  1,000 mg IntraVENous Q24H     PRN Meds: LORazepam, hydrOXYzine HCl, sodium chloride flush, sodium chloride, potassium chloride, magnesium sulfate, promethazine **OR** ondansetron, acetaminophen **OR** acetaminophen, oxyCODONE-acetaminophen **OR** oxyCODONE-acetaminophen      Intake/Output Summary (Last 24 hours) at 2/8/2023 1126  Last data filed at 2/8/2023 1014  Gross per 24 hour   Intake 3053.21 ml   Output --   Net 3053.21 ml         Physical Exam Performed:    BP (!) 132/99   Pulse (!) 104   Temp 98.3 °F (36.8 °C) (Oral)   Resp 28   Ht 5' 3\" (1.6 m)   Wt 157 lb 3 oz (71.3 kg)   SpO2 99%   BMI 27.84 kg/m²     General appearance: Agitated  HEENT: Pupils equal, round, and reactive to light. Conjunctivae/corneas clear. Neck: Supple, with full range of motion. No jugular venous distention. Trachea midline. Respiratory:  Normal respiratory effort. Clear to auscultation, bilaterally without Rales/Wheezes/Rhonchi. Cardiovascular: Regular rate and rhythm with normal S1/S2 without murmurs, rubs or gallops.   Abdomen: Soft, non-tender, non-distended with normal bowel sounds. Musculoskeletal: No clubbing, cyanosis or edema bilaterally. Full range of motion without deformity. Skin: Skin color, texture, turgor normal.  No rashes or lesions. Neurologic: Awake and moves all 4 extremities  Capillary Refill: Brisk, 3 seconds, normal   Peripheral Pulses: +2 palpable, equal bilaterally       Labs:   Recent Labs     02/05/23  1242   WBC 9.4   HGB 10.5*   HCT 32.8*          Recent Labs     02/05/23  1242   *   K 5.2*      CO2 19*   BUN 23*   CREATININE 0.8   CALCIUM 8.3       Recent Labs     02/05/23  1242   *   *   BILIDIR <0.2   BILITOT 0.7   ALKPHOS 113       No results for input(s): INR in the last 72 hours. Recent Labs     02/05/23  1242   CKTOTAL 52         Urinalysis:      Lab Results   Component Value Date/Time    NITRU POSITIVE 02/03/2023 07:49 PM    WBCUA 21-50 02/03/2023 07:49 PM    BACTERIA 4+ 02/03/2023 07:49 PM    RBCUA 11-20 02/03/2023 07:49 PM    RBCUA 9 08/10/2021 01:51 PM    BLOODU LARGE 02/03/2023 07:49 PM    SPECGRAV >=1.030 02/03/2023 07:49 PM    GLUCOSEU Negative 02/03/2023 07:49 PM       Radiology:  CT ABDOMEN PELVIS WO CONTRAST Additional Contrast? None   Final Result      1. Small to moderate right pleural effusion and small left pleural effusion with subsegmental atelectasis. No visible pneumonia. 2. Mild mesenteric and retroperitoneal edema, trace ascites and periportal edema. Hepatocellular disease is a possibility. Correlate with liver function tests. XR CHEST (2 VW)   Final Result      No evidence for acute cardiopulmonary disease. IP CONSULT TO SOCIAL WORK  IP CONSULT TO PSYCHIATRY    Assessment/Plan:    Active Hospital Problems    Diagnosis     Pyelonephritis [N12]      Priority: Medium    Acute pyelonephritis [N10]      Priority: Medium     1. Sepsis due to UTI, present on admission  -Urine culture grew mixed cruzito.  Blood CX negative.   -Discontinue IV fluid and change from IV to oral antibiotic to complete total 7 to 10 days. Talked to her labs draw but she still refusing. 2.  Amphetamine abuse  -She is likely withdrawing from amphetamine with agitation, elevated heart rate and blood pressure, monitor - she reports last use was in December which is inconsistent with her tox screen. She intermittently gets lethargic with unclear etiology. 3.  Active viral hepatitis with concern for chronic liver disease  -Hepatitis C is positive. She has been treated in the past.  -Hepatitis B surface antigen is also reactive. Follow-up hepatitis B PCR  -Will need GI or hepatology as outpatient    4. Homelessness  -Follow-up  recommendation    5. Bipolar disorder. History of bulimia nervosa  -Psych has been consulted. DVT Prophylaxis: Lovenox  Diet: ADULT DIET; Regular; 3 carb choices (45 gm/meal); Low Fat/Low Chol/High Fiber/2 gm Na; Low Sodium (2 gm)  Code Status: Full Code  PT/OT Eval Status: No need    Dispo -follow-up psych and  recommendation. She is homeless and might need to go to shelter if do not need inpatient psych.         Stef Moreno MD

## 2023-02-08 NOTE — PROGRESS NOTES
Meredith Ernesto 28 y.o.  female  - hx of Hep C s/p rx @ [facility] in 2019, bipolar disorder, hx of methamphetamine use, presented to the hospital on 02/03/23  abdominal pain and back pain as well as nausea and vomiting. In the ED noted to be tachycardiac, LA 3.6, UA concern for infection, AST 55, . CT abdomen pelvis showed bilateral effusion right greater than left, mesenteric and retroperitoneal edema trace edema ascites, periportal edema  Admitted for sepsis due to concern for UTI  During hospitalization continued to be tachycardiac, in bed moaning, Urine culture grew mixed cruzito. Blood CX negative. Tachycardia was thought to be due to amphetamine w/d, she denied current IVDU, UDS was +ve which she reported as she was taking addreall from off the street    With noted confusion, persistent tachycardia she got repeat labs done which showed sodium 133 bicarb of 17 lactic at 6.1 significant elevation in transaminitis with 1101 ALT 1254 AST, INR 2.3  With her Abdominal tenderness and guarding on exam, CT Chest abd pelvis was done which showed concerning finding    Subcutaneous edema present in the neck, superior mediastinum anasarca concerning for third spacing, large layering right-sided pleural effusion, small layering left-sided pleural effusion, bilateral atelectasis. Venous collaterals were noted SVC, left innominate vein left subclavian vein and left axillary veins were noted to be patent  Cardiac chambers including left ventricular right ventricle right atrium enlargement concerning for cardiomyopathy  Free fluid seen in the paracolic gutters, Morison pouch  Liver attenuation is decreased, gallbladder wall edema present no bowel wall thickening was noted    Severe transaminitis with ALT & AST greater than 1000, on admission  AST 55 concerning for fulminant hepatic failure.   Hep B surface antigen is positive, she has a history of surface antibody positive but concerned that with her immunosuppressed state in the setting of drug use she could have acquired a fulminant hep B infection. Per chart review and per patient report she has been treated for hep C in 2019 at [facility]. Further acute hepatic failure  GI was consulted, Hepatitis panel was sent, further hepatitis labs were ordered. INR is 2.3 suggests worsening of the liver synthetic function, MELD is 19  Trend INR  Watch liver function test daily  ? ??  Passive congestion leading to this  Discussed with Dr. Gris Blackburn who recommended that best care for this patient would be a liver transplant facility    Biventricular and right atrial enlargement, heart appears globular on CT imaging as well as with moderate pericardial effusion bilateral pleural effusions.   Patient is tachycardic but blood pressure stable  We did a formal echo tomorrow morning  I briefly discussed with Dr. Georgina Josue  I will stop IV fluids for now  Monitor hemodynamics closely in the intensive care unit  With history of IV drug use also need to look for endocarditis  Blood cultures on admission were negative  Repeat blood cultures ordered hopefully patient will allow us to get this done    Lactic acidosis likely due to decreased clearance  Monitor lactic acid levels    Mental health problem/bipolar disorder  We will hold Latuda for now    Metabolic acidosis likely lactic acidosis, continue close monitoring, patient is tachypneic likely due to respiratory compensation  She is high risk and may need mechanical ventilation support overnight if she tires down    Concern for UTI sepsis on admission, she received 4 days of Rocephin, will hold further antibiotics, urine culture did results were not significant    Pt has a high probability of imminent or life-threatening deterioration requiring close monitoring, and highly complex decision-making and/or interventions of high intensity to assess, manipulate, and support his critical organ systems to prevent the his inevitable decline which would occur if left untreated. A total critical care time 160+ minutes spent, this includes but not limited to examining patient, collaborating with other physicians, monitoring vital signs, telemetry, and clinical response to IV medications; documentation time, review and interpretation of laboratory and radiological data, review of nursing notes and old record review. This time excludes any time that may have been spent performing procedures.      Rory Eli MD, [facility], [facility]  Hospitalist  Attending Physician

## 2023-02-09 PROBLEM — F31.10 BIPOLAR AFFECTIVE DISORDER, CURRENT EPISODE MANIC (HCC): Status: ACTIVE | Noted: 2023-02-09

## 2023-02-09 PROBLEM — I10 PRIMARY HYPERTENSION: Status: ACTIVE | Noted: 2023-02-09

## 2023-02-09 PROBLEM — I50.21 ACUTE SYSTOLIC CONGESTIVE HEART FAILURE (HCC): Status: ACTIVE | Noted: 2023-02-09

## 2023-02-09 PROBLEM — B16.9 ACUTE HEPATITIS B: Status: ACTIVE | Noted: 2023-02-09

## 2023-02-09 LAB
A/G RATIO: 1.2 (ref 1.1–2.2)
ALBUMIN SERPL-MCNC: 2.2 G/DL (ref 3.4–5)
ALBUMIN SERPL-MCNC: 2.6 G/DL (ref 3.4–5)
ALP BLD-CCNC: 119 U/L (ref 40–129)
ALT SERPL-CCNC: 1043 U/L (ref 10–40)
ANION GAP SERPL CALCULATED.3IONS-SCNC: 12 MMOL/L (ref 3–16)
ANION GAP SERPL CALCULATED.3IONS-SCNC: 14 MMOL/L (ref 3–16)
ANTI-NUCLEAR ANTIBODY (ANA): NEGATIVE
AST SERPL-CCNC: 989 U/L (ref 15–37)
ATYPICAL LYMPHOCYTE RELATIVE PERCENT: 1 % (ref 0–6)
BANDED NEUTROPHILS RELATIVE PERCENT: 2 % (ref 0–7)
BASOPHILS ABSOLUTE: 0 K/UL (ref 0–0.2)
BASOPHILS RELATIVE PERCENT: 0 %
BETA-HYDROXYBUTYRATE: 0.2 MMOL/L (ref 0–0.27)
BILIRUB SERPL-MCNC: 0.8 MG/DL (ref 0–1)
BUN BLDV-MCNC: 17 MG/DL (ref 7–20)
BUN BLDV-MCNC: 17 MG/DL (ref 7–20)
C-REACTIVE PROTEIN: 17.7 MG/L (ref 0–5.1)
CALCIUM SERPL-MCNC: 6.7 MG/DL (ref 8.3–10.6)
CALCIUM SERPL-MCNC: 7.9 MG/DL (ref 8.3–10.6)
CHLORIDE BLD-SCNC: 101 MMOL/L (ref 99–110)
CHLORIDE BLD-SCNC: 107 MMOL/L (ref 99–110)
CO2: 16 MMOL/L (ref 21–32)
CO2: 17 MMOL/L (ref 21–32)
CREAT SERPL-MCNC: 0.5 MG/DL (ref 0.6–1.1)
CREAT SERPL-MCNC: 0.6 MG/DL (ref 0.6–1.1)
EKG ATRIAL RATE: 116 BPM
EKG ATRIAL RATE: 127 BPM
EKG DIAGNOSIS: NORMAL
EKG DIAGNOSIS: NORMAL
EKG P AXIS: 52 DEGREES
EKG P AXIS: 71 DEGREES
EKG P-R INTERVAL: 114 MS
EKG P-R INTERVAL: 120 MS
EKG Q-T INTERVAL: 288 MS
EKG Q-T INTERVAL: 306 MS
EKG QRS DURATION: 78 MS
EKG QRS DURATION: 82 MS
EKG QTC CALCULATION (BAZETT): 400 MS
EKG QTC CALCULATION (BAZETT): 444 MS
EKG R AXIS: 111 DEGREES
EKG R AXIS: 112 DEGREES
EKG T AXIS: -20 DEGREES
EKG T AXIS: 35 DEGREES
EKG VENTRICULAR RATE: 116 BPM
EKG VENTRICULAR RATE: 127 BPM
EOSINOPHILS ABSOLUTE: 0 K/UL (ref 0–0.6)
EOSINOPHILS RELATIVE PERCENT: 0 %
GFR SERPL CREATININE-BSD FRML MDRD: >60 ML/MIN/{1.73_M2}
GFR SERPL CREATININE-BSD FRML MDRD: >60 ML/MIN/{1.73_M2}
GLUCOSE BLD-MCNC: 121 MG/DL (ref 70–99)
GLUCOSE BLD-MCNC: 133 MG/DL (ref 70–99)
HBV SURFACE AB TITR SER: >1000 MIU/ML
HCT VFR BLD CALC: 38.6 % (ref 36–48)
HEMOGLOBIN: 11.9 G/DL (ref 12–16)
HIV AG/AB: NORMAL
HIV ANTIGEN: NORMAL
HIV-1 ANTIBODY: NORMAL
HIV-2 AB: NORMAL
IGA: 288 MG/DL (ref 70–400)
IGG: 843 MG/DL (ref 700–1600)
IGM: 211 MG/DL (ref 40–230)
INR BLD: 2.13 (ref 0.87–1.14)
INR BLD: 2.45 (ref 0.87–1.14)
INR BLD: 2.71 (ref 0.87–1.14)
LACTIC ACID: 4.1 MMOL/L (ref 0.4–2)
LV EF: 13 %
LVEF MODALITY: NORMAL
LYMPHOCYTES ABSOLUTE: 5.3 K/UL (ref 1–5.1)
LYMPHOCYTES RELATIVE PERCENT: 34 %
MAGNESIUM: 1.4 MG/DL (ref 1.8–2.4)
MAGNESIUM: 1.7 MG/DL (ref 1.8–2.4)
MCH RBC QN AUTO: 26.3 PG (ref 26–34)
MCHC RBC AUTO-ENTMCNC: 30.9 G/DL (ref 31–36)
MCV RBC AUTO: 84.9 FL (ref 80–100)
METAMYELOCYTES RELATIVE PERCENT: 1 %
MONOCYTES ABSOLUTE: 0.8 K/UL (ref 0–1.3)
MONOCYTES RELATIVE PERCENT: 5 %
NEUTROPHILS ABSOLUTE: 9 K/UL (ref 1.7–7.7)
NEUTROPHILS RELATIVE PERCENT: 57 %
PDW BLD-RTO: 18.3 % (ref 12.4–15.4)
PHOSPHORUS: 2.3 MG/DL (ref 2.5–4.9)
PLATELET # BLD: 406 K/UL (ref 135–450)
PLATELET SLIDE REVIEW: ADEQUATE
PMV BLD AUTO: 8.5 FL (ref 5–10.5)
POTASSIUM REFLEX MAGNESIUM: 3.8 MMOL/L (ref 3.5–5.1)
POTASSIUM SERPL-SCNC: 3.7 MMOL/L (ref 3.5–5.1)
PROCALCITONIN: 0.11 NG/ML (ref 0–0.15)
PROTHROMBIN TIME: 23.9 SEC (ref 11.7–14.5)
PROTHROMBIN TIME: 26.7 SEC (ref 11.7–14.5)
PROTHROMBIN TIME: 28.9 SEC (ref 11.7–14.5)
RBC # BLD: 4.54 M/UL (ref 4–5.2)
SLIDE REVIEW: ABNORMAL
SODIUM BLD-SCNC: 132 MMOL/L (ref 136–145)
SODIUM BLD-SCNC: 135 MMOL/L (ref 136–145)
TOTAL PROTEIN: 4.8 G/DL (ref 6.4–8.2)
TRIGL SERPL-MCNC: 82 MG/DL (ref 0–150)
WBC # BLD: 15 K/UL (ref 4–11)

## 2023-02-09 PROCEDURE — 99223 1ST HOSP IP/OBS HIGH 75: CPT | Performed by: INTERNAL MEDICINE

## 2023-02-09 PROCEDURE — 6360000002 HC RX W HCPCS: Performed by: STUDENT IN AN ORGANIZED HEALTH CARE EDUCATION/TRAINING PROGRAM

## 2023-02-09 PROCEDURE — 36592 COLLECT BLOOD FROM PICC: CPT

## 2023-02-09 PROCEDURE — 83735 ASSAY OF MAGNESIUM: CPT

## 2023-02-09 PROCEDURE — 84443 ASSAY THYROID STIM HORMONE: CPT

## 2023-02-09 PROCEDURE — 87522 HEPATITIS C REVRS TRNSCRPJ: CPT

## 2023-02-09 PROCEDURE — 85025 COMPLETE CBC W/AUTO DIFF WBC: CPT

## 2023-02-09 PROCEDURE — 2000000000 HC ICU R&B

## 2023-02-09 PROCEDURE — C1751 CATH, INF, PER/CENT/MIDLINE: HCPCS

## 2023-02-09 PROCEDURE — 6370000000 HC RX 637 (ALT 250 FOR IP): Performed by: STUDENT IN AN ORGANIZED HEALTH CARE EDUCATION/TRAINING PROGRAM

## 2023-02-09 PROCEDURE — 6360000002 HC RX W HCPCS: Performed by: INTERNAL MEDICINE

## 2023-02-09 PROCEDURE — 84439 ASSAY OF FREE THYROXINE: CPT

## 2023-02-09 PROCEDURE — 6370000000 HC RX 637 (ALT 250 FOR IP): Performed by: INTERNAL MEDICINE

## 2023-02-09 PROCEDURE — 86015 ACTIN ANTIBODY EACH: CPT

## 2023-02-09 PROCEDURE — 86704 HEP B CORE ANTIBODY TOTAL: CPT

## 2023-02-09 PROCEDURE — 93010 ELECTROCARDIOGRAM REPORT: CPT | Performed by: INTERNAL MEDICINE

## 2023-02-09 PROCEDURE — 2580000003 HC RX 258: Performed by: STUDENT IN AN ORGANIZED HEALTH CARE EDUCATION/TRAINING PROGRAM

## 2023-02-09 PROCEDURE — 82784 ASSAY IGA/IGD/IGG/IGM EACH: CPT

## 2023-02-09 PROCEDURE — 02HV33Z INSERTION OF INFUSION DEVICE INTO SUPERIOR VENA CAVA, PERCUTANEOUS APPROACH: ICD-10-PCS | Performed by: INTERNAL MEDICINE

## 2023-02-09 PROCEDURE — 80053 COMPREHEN METABOLIC PANEL: CPT

## 2023-02-09 PROCEDURE — 36415 COLL VENOUS BLD VENIPUNCTURE: CPT

## 2023-02-09 PROCEDURE — 83605 ASSAY OF LACTIC ACID: CPT

## 2023-02-09 PROCEDURE — 85610 PROTHROMBIN TIME: CPT

## 2023-02-09 PROCEDURE — 86708 HEPATITIS A ANTIBODY: CPT

## 2023-02-09 PROCEDURE — 36569 INSJ PICC 5 YR+ W/O IMAGING: CPT

## 2023-02-09 PROCEDURE — 2580000003 HC RX 258: Performed by: INTERNAL MEDICINE

## 2023-02-09 PROCEDURE — C8929 TTE W OR WO FOL WCON,DOPPLER: HCPCS

## 2023-02-09 RX ORDER — SODIUM CHLORIDE 0.9 % (FLUSH) 0.9 %
5-40 SYRINGE (ML) INJECTION PRN
Status: DISCONTINUED | OUTPATIENT
Start: 2023-02-09 | End: 2023-02-17 | Stop reason: HOSPADM

## 2023-02-09 RX ORDER — LACTULOSE 10 G/15ML
20 SOLUTION ORAL 3 TIMES DAILY
Status: DISCONTINUED | OUTPATIENT
Start: 2023-02-09 | End: 2023-02-11

## 2023-02-09 RX ORDER — SODIUM CHLORIDE 0.9 % (FLUSH) 0.9 %
5-40 SYRINGE (ML) INJECTION EVERY 12 HOURS SCHEDULED
Status: DISCONTINUED | OUTPATIENT
Start: 2023-02-09 | End: 2023-02-17 | Stop reason: HOSPADM

## 2023-02-09 RX ORDER — LIDOCAINE HYDROCHLORIDE 10 MG/ML
5 INJECTION, SOLUTION EPIDURAL; INFILTRATION; INTRACAUDAL; PERINEURAL ONCE
Status: DISCONTINUED | OUTPATIENT
Start: 2023-02-09 | End: 2023-02-17 | Stop reason: HOSPADM

## 2023-02-09 RX ORDER — SODIUM CHLORIDE 9 MG/ML
25 INJECTION, SOLUTION INTRAVENOUS PRN
Status: DISCONTINUED | OUTPATIENT
Start: 2023-02-09 | End: 2023-02-17 | Stop reason: HOSPADM

## 2023-02-09 RX ADMIN — MAGNESIUM SULFATE HEPTAHYDRATE 2000 MG: 40 INJECTION, SOLUTION INTRAVENOUS at 16:15

## 2023-02-09 RX ADMIN — SODIUM CHLORIDE, PRESERVATIVE FREE 10 ML: 5 INJECTION INTRAVENOUS at 20:04

## 2023-02-09 RX ADMIN — FUROSEMIDE 20 MG: 10 INJECTION, SOLUTION INTRAMUSCULAR; INTRAVENOUS at 00:20

## 2023-02-09 RX ADMIN — LACTULOSE 20 G: 20 SOLUTION ORAL at 20:09

## 2023-02-09 RX ADMIN — FUROSEMIDE 5 MG/HR: 10 INJECTION INTRAMUSCULAR; INTRAVENOUS at 16:12

## 2023-02-09 RX ADMIN — LACTULOSE 20 G: 20 SOLUTION ORAL at 09:55

## 2023-02-09 RX ADMIN — SODIUM CHLORIDE, PRESERVATIVE FREE 10 ML: 5 INJECTION INTRAVENOUS at 07:50

## 2023-02-09 RX ADMIN — LACTULOSE 20 G: 20 SOLUTION ORAL at 16:20

## 2023-02-09 RX ADMIN — LACTULOSE 20 G: 20 SOLUTION ORAL at 07:50

## 2023-02-09 RX ADMIN — LACTULOSE 20 G: 20 SOLUTION ORAL at 04:50

## 2023-02-09 RX ADMIN — LORAZEPAM 1 MG: 2 INJECTION INTRAMUSCULAR; INTRAVENOUS at 20:39

## 2023-02-09 ASSESSMENT — PAIN SCALES - WONG BAKER
WONGBAKER_NUMERICALRESPONSE: 0

## 2023-02-09 ASSESSMENT — ENCOUNTER SYMPTOMS
CONSTIPATION: 0
CHEST TIGHTNESS: 0
SHORTNESS OF BREATH: 0
NAUSEA: 0
ABDOMINAL PAIN: 0
COUGH: 0
SORE THROAT: 0
VOMITING: 0
BACK PAIN: 1
DIARRHEA: 0
NAUSEA: 1
RHINORRHEA: 0

## 2023-02-09 ASSESSMENT — PAIN SCALES - GENERAL
PAINLEVEL_OUTOF10: 0
PAINLEVEL_OUTOF10: 0

## 2023-02-09 NOTE — PROGRESS NOTES
Transfer to ICU       Code:  Full Code  PCP:  No primary care provider on file. Admit Date:  2/3/2023                              Hospital Day:  5  ICU Day:  1  Vent Day:  n/a  LDAs:  peripheral    Continuous Infusions:  none  Vasopressors:  none  Antibiotics:   CTX (2/3-)  Diet:  ADULT DIET; Regular; 3 carb choices (45 gm/meal); Low Fat/Low Chol/High Fiber/2 gm Na; Low Sodium (2 gm); 1200 ml    CC: abdominal and back pain    History obtained from:  chart review and the patient    Brief HPI & Hospital Course:  Ms. Walter Marques is a 24-year-old  female with medical history significant for bipolar affective disorder, hepatitis C infection in 02/2020 treated with ledipasvir-sofosbuvir, essential hypertension, asthma, and h/o crystal meth use who presented to the ED with a one-week history of chills, sinus congestion, cough, dyspnea, abdominal pain, and low back and was admitted on 5/03/3376 for acute complicated cystitis with bilateral pleural effusions for which ceftriaxone and IVF were initiated. Methamphetamine was found on UDS. Patient reports that she hasn't used crystal meth within the last year; however, reports recent use of Adderall for ADHD. Patient had been refusing lab draws since 2/6; however, today, she was found to be increasingly lethargic and continued to have back pain with nausea. Patient finally allowed for lab draw which demonstrated acute liver injury. She was transferred to the ICU on 2/08/2023 for concerns of impending acute respiratory failure in the setting of lactic acidosis secondary to acute liver injury. This am pt has agreed to repeat lab draw, appreciate GI input, agree that pt's refusal intermittently of evaluation and treatment options makes it difficult to provide care. Currently hemodynamically stable, awaiting labs.        Past Medical History:   Diagnosis Date    Allergic rhinitis     Asthma     Bipolar 1 disorder ([facility] [address]     Bipolar affective disorder ([facility] [address]     Cigarette nicotine dependence     History of amphetamine dependence/abuse ([facility] [address]     Hypertension        Past Surgical History:   Procedure Laterality Date     SECTION         Social History:   The patient has been living outside in a tent for the last month. Alcohol:  rare, social use  Illicit drugs:  crystal meth, marijuana  Tobacco:  started smoking cigarettes around , 0.5 to 1.5 PPD    Family History   Problem Relation Age of Onset    Asthma Sister     Diabetes Maternal Grandmother     Heart Disease Maternal Grandmother          MEDICATIONS:  No current facility-administered medications on file prior to encounter.      Current Outpatient Medications on File Prior to Encounter   Medication Sig Dispense Refill    lurasidone (LATUDA) 40 MG TABS tablet Take 40 mg by mouth daily (with breakfast)      loratadine (CLARITIN) 10 MG tablet Take 10 mg by mouth daily      busPIRone (BUSPAR) 10 MG tablet Take 10 mg by mouth daily      albuterol sulfate HFA (PROVENTIL;VENTOLIN;PROAIR) 108 (90 Base) MCG/ACT inhaler 2 puffs q4h prn      labetalol (NORMODYNE) 200 MG tablet Take 200 mg by mouth daily      NIFEdipine (PROCARDIA XL) 90 MG extended release tablet Take 90 mg by mouth daily      VORTIoxetine (TRINTELLIX) 10 MG TABS tablet Take 10 mg by mouth daily      ibuprofen (ADVIL;MOTRIN) 800 MG tablet Take 1 tablet by mouth every 8 hours as needed for Pain or Fever 30 tablet 2    calcium carbonate (TUMS) 500 MG chewable tablet Take 1 tablet by mouth daily      Prenatal MV-Min-Fe Fum-FA-DHA (PRENATAL 1 PO) Take by mouth (Patient not taking: Reported on 2023)         Scheduled Meds:     lactulose enema   Rectal Once    lactulose  20 g Oral TID    lidocaine 1 % injection  5 mL IntraDERmal Once    sodium chloride flush  5-40 mL IntraVENous 2 times per day    [Held by provider] lurasidone  20 mg Oral Daily    polyethylene glycol  17 g Oral Daily    sodium chloride flush  10 mL IntraVENous 2 times per day    nicotine  1 patch TransDERmal Daily      Continuous Infusions:     furosemide (LASIX) 1mg/mL infusion      sodium chloride      sodium chloride 5 mL/hr at 02/08/23 2215       Allergies: Allergies   Allergen Reactions    Latex Anaphylaxis    Bee Venom Anaphylaxis    Pcn [Penicillins] Hives    Amoxil [Amoxicillin] Rash    Sulfa Antibiotics Rash       Review of Systems   Constitutional:  Positive for fever. Negative for appetite change, chills and diaphoresis. HENT:  Negative for congestion, rhinorrhea and sore throat. Respiratory:  Negative for cough, chest tightness and shortness of breath. Cardiovascular:  Negative for chest pain. Gastrointestinal:  Positive for nausea. Negative for abdominal pain, constipation and vomiting. Endocrine: Positive for polydipsia. Genitourinary:  Negative for difficulty urinating and dysuria. Musculoskeletal:  Positive for back pain. Skin:         Denies pruritus    Neurological:  Negative for dizziness, light-headedness and headaches. Vitals:    02/09/23 0400 02/09/23 0500 02/09/23 0600 02/09/23 0753   BP: (!) 123/105 (!) 133/105 (!) 136/109    Pulse: (!) 118 (!) 114 (!) 117    Resp: (!) 36 (!) 34 10    Temp: 100 °F (37.8 °C)   99 °F (37.2 °C)   TempSrc: Oral   Temporal   SpO2: 100%      Weight:       Height:           I/O:      Intake/Output Summary (Last 24 hours) at 2/9/2023 1312  Last data filed at 2/9/2023 0848  Gross per 24 hour   Intake 1937 ml   Output 350 ml   Net 1587 ml       I/O this shift:  In: 240 [P.O.:240]  Out: 350 [Urine:350]  I/O last 3 completed shifts: In: 5050.2 [P.O.:2597; I.V.:2253.2; IV Piggyback:200]  Out: -       PHYSICAL EXAM:  Constitutional:  Female, appears as stated age, restless, rocking side-to-side in bed. HEENT:  EOMI by observation. No lacrimation, scleral icterus, or conjunctival injection. No rhinorrhea. Dry lower lip with midline fissure and scant dried blood.   Moist, pink oral mucous membranes. Cardiovascular:  No apparent JVD. Tachycardia. Regular rhythm. No murmurs, rubs, or gallops. Radial pulses 2+. Capillary refill < 2 sec. Pulmonary:  No perioral cyanosis. Supraclavicular retractions, shallow breaths, tachypneic. CTAB. Abdomen:  Distended. Normoactive bowel sounds. Soft. TTP over epigastric region. Musculoskeletal:  No upper extremity edema. Trace bilateral lower extremity edema extending proximally to mid-lower legs. No evidence of venous stasis changes. Skin:  Warm, dry, no jaundice. Neurological:  Alert, awake, oriented to person, time, place, and situation. No asterixis. No tremors. Psychological:  Normal attention. Anxious affect. Speech intermittently slurred. Hyperactive. LABS:  ABGs:  No results for input(s): PHART, RCN0HMZ, PO2ART in the last 72 hours.     VBGs:    Recent Labs     02/08/23  1700   PHVEN 7.303*   IFU1UGK 40.8*   PO2VEN <30.0       Recent Labs     02/08/23  1517 02/09/23  1028   WBC 13.9* 15.0*   HGB 11.3* 11.9*   HCT 37.0 38.6    406         Recent Labs     02/08/23  1517 02/09/23  1028   * 132*   K 4.3 3.8    101   CO2 17* 17*   BUN 18 17   CREATININE 0.7 0.6   GLUCOSE 145* 121*       Component Ref Range & Units 2/8/23 1517 2/5/23 1242 2/5/23 1242 2/3/23 1949   Sodium 136 - 145 mmol/L 133 Low    132 Low   138    Potassium reflex Magnesium 3.5 - 5.1 mmol/L 4.3   5.2 High   4.8    Chloride 99 - 110 mmol/L 103   104  102    CO2 21 - 32 mmol/L 17 Low    19 Low   24    Anion Gap 3 - 16 13   9  12    Glucose 70 - 99 mg/dL 145 High    124 High   107 High     BUN 7 - 20 mg/dL 18   23 High   15    Creatinine 0.6 - 1.1 mg/dL 0.7   0.8  0.6    Est, Glom Filt Rate >60 >60   >60 CM  >60 CM    Calcium 8.3 - 10.6 mg/dL 8.1 Low    8.3  9.0    Total Protein 6.4 - 8.2 g/dL 5.1 Low   5.3 Low    6.7    Albumin 3.4 - 5.0 g/dL 3.0 Low   3.2 Low    3.8    Albumin/Globulin Ratio 1.1 - 2.2 1.4    1.3    Total Bilirubin 0.0 - 1.0 mg/dL 0.7 0.7   0.9    Alkaline Phosphatase 40 - 129 U/L 120  113   128    ALT 10 - 40 U/L 1,101 High   147 High    107 High     AST 15 - 37 U/L 1,254 High   130 High    55 High     Resulting Agency  [facility]      Recent Labs     02/08/23  1517 02/09/23  1028   AST 1,254* 989*   ALT 1,101* 1,043*   BILITOT 0.7 0.8   ALKPHOS 120 119       No results for input(s): TROPONINI in the last 72 hours. Recent Labs     02/08/23  2620 Merged with [facility]     02/08/23  1828 02/09/23  1028   INR 2.30* 2.45*         Urinalysis: No results for input(s): NITRITE, COLORU, PHUR, LABCAST, WBCUA, RBCUA, MUCUS, TRICHOMONAS, YEAST, BACTERIA, CLARITYU, SPECGRAV, LEUKOCYTESUR, UROBILINOGEN, BILIRUBINUR, BLOODU, GLUCOSEU, AMORPHOUS in the last 72 hours. Invalid input(s): KETONEU      MICROBIOLOGY:   Component 2/3/23 1949    Urine Culture, Routine <10,000 CFU/ml mixed skin/urogenital cruzito. No further workup Abnormal     Organism Strep agalactiae (Beta Strep Group B) Abnormal     Urine Culture, Routine <10,000 CFU/ml   Susceptibility testing of penicillin and other beta lactams is not necessary for beta hemolytic Streptococci since resistant strains have not been identified. IMMUNOLOGY/SEROLOGY:  Component Ref Range & Units 2/3/23 1945    Hep A IgM Non-reactive Non-reactive    Hep B Core Ab, IgM Non-reactive Non-reactive    Hep B S Ag Interp Non-reactive Reactive Abnormal     Comment: REPEATEDLY REACTIVE - Confirmation to follow   Hep C Ab Interp Non-reactive REACTIVE Abnormal     Comment: REACTIVE Screen:   Confirmation with Hepatitis C RIBA not available. Depending on clinical history, Hepatitis C Virus (HCV) by Quantitative NAAT ([id number]) should be considered as an alternative to this test although it is not an equivalent. If HCV by Quantitative NAAT is desired, please reorder.  A new sample may be needed due to specimen requirements for HCV by Quantitative NAAT. RADIOLOGY:  CT CHEST ABDOMEN PELVIS W CONTRAST Additional Contrast? None   Final Result      1. There is edema identified within the fat planes of the neck as well as within the subcutaneous fat about the thorax. There is also edema within the superior mediastinum. Correlate for anasarca-third spacing. 2. Large layering right-sided pleural effusion is increased from the prior study. There is a small layering left pleural effusion which is mildly increased from the prior study. 3. There is some compressive atelectasis within dependent portion of right lower lobe. Linear atelectasis is present with the right middle lobe and lingula. 4. There are multiple venous collaterals identified about the left shoulder girdle. This is nonspecific. The SVC, left innominate vein, left subclavian vein and left axillary vein are patent. 5. There is some enlargement of the left ventricle, right ventricle and right atrium. Correlate for cardiomyopathy. This could be better evaluated with echocardiogram.      ABDOMEN AND PELVIS: There is subcutaneous edema identified about the abdomen and pelvis. Note should be made that the inferior pelvis was not included within the field-of-view. There is a small amount of free fluid identified with the right paracolic gutter, Morison's pouch as well as the pelvic cul-de-sac. Liver attenuation is decreased. There is some gallbladder wall edema. Pancreas, spleen and adrenal glands are normal. Renal size and enhancement is normal.      No bowel dilatation or bowel wall thickening is identified. Note should be made that some small bowel loops as well as the cecum were not entirely included within the field-of-view due to incomplete imaging through the pelvis. No adnexal mass is identified. No lymphadenopathy is identified. IMPRESSION:      1. Subcutaneous edema is identified about the abdomen and pelvis.  This can be seen with anasarca-third spacing. 2. There is gallbladder wall edema. This is nonspecific and may be related to volume overload. Cholecystitis cannot entirely be excluded. 3. Liver attenuation is decreased. This may reflect hepatic steatosis or hepatitis. Correlate with liver function tests. 4. Small amount of free fluid is identified within the abdomen and pelvis. CT HEAD WO CONTRAST   Final Result   1. No acute intracranial process. CT ABDOMEN PELVIS WO CONTRAST Additional Contrast? None   Final Result      1. Small to moderate right pleural effusion and small left pleural effusion with subsegmental atelectasis. No visible pneumonia. 2. Mild mesenteric and retroperitoneal edema, trace ascites and periportal edema. Hepatocellular disease is a possibility. Correlate with liver function tests. XR CHEST (2 VW)   Final Result      No evidence for acute cardiopulmonary disease. ASSESSMENT & PLAN:  Ms. Walter Marques is a 26-year-old  female who presented to the ED with a one-week history of chills, sinus congestion, cough, dyspnea, abdominal pain, and low back and was admitted on 2/04/2023 for acute urosepsis with bilateral pleural effusions. She was transferred to the ICU on 2/08/2023 for concerns of impending acute respiratory failure in the setting of lactic acidosis secondary to acute liver injury. Acute liver injury - Likely 2/2 HBV infection with HBsAg (+) on 2/3. History of acute hepatitis C infection - Treated with ledipasvir-sofosbuvir (genotype 1) in 02/2020 at [facility]. FU HCV quantitative RNA by PCR without any copies on 6/18/2020.   Lactic acidosis, type B - likely 2/2 acute liver injury  VBG (2/8, 5pm):  pH 7.303, pCO2 40.8, bicarb 20.2   Anti-HBc, HBeAg, HBV DNA - pending to complete assessment for acute HBV infection  F-actin Ab, THOR, IgM, IgG, IgA, HIV screen, HepC RNA PCR - pending collection to further assess for alternative etiologies of the acute liver failure  MELD-Na score of 20. Referral to  liver transplant center - bed availability pending; wait time is > 2 days. Also not likely to be candidate for transfer as she is intermittently refusing labs, etc. Also, it appears that pt has been actively using meth recently, (tox positive on admit) which will likely preclude her from being eligible for transplant. INR Q4H to monitor for decompensation  Vit K 10 mg IV administered on 2/8  LFTs QD    Moderate pericardial effusion - seen on CT scan 2/8  Dilated LA/LV/RV - seen on CT scan 2/8 - possibly DCM 2/2 chronic methamphetamine use  History of crystal methamphetamine use - UDS on 2/3 positive for methamphetamine. Patient reports recent Adderall use. Cardiology consulted   TTE pending   Continuous monitoring on telemetry    Large layering right-sided pleural effusion - possibly 2/2 acute cardiac vs acute liver failure - worsening since admission  Small layering left-sided pleural effusion - possibly 2/2 acute cardiac vs acute liver failure - slight worsening since admission  Patient is currently saturating well on RA; however, RR is sustaining in 30s-40s. Monitor closely in the ICU for decompensation necessitating intubation. Patient has confirmed she would like to be intubated if needed. Lasix 20 mg IV     Acute back pain - persistent  MRI spine to assess for possible osteomyelitis in patient with h/o IVDU    Acute complicated cystitis (POA) - concerns for urosepsis  Urine Cx 2/3:  S. agalactiae < 10,000 CFU/mL mixed skin/urogenital cruzito  CTX started on 2/3  BCx x 2 collected. BCx 1 - NGTD.   Procalcitonin pending  Strict I/Os  Daily weights    Essential hypertension - uncontrolled  Hold home meds labetalol, nifedipine  Consider restarting labetalol after IV Lasix    Asthma, mild  Albuterol PRN    Bipolar affective disorder  History of bulimia nervosa  Psychiatry consulted  Apolonia Mann in setting of acute liver failure    Nicotine dependence  Nicotine patch        Code Status:  Full Code  FEN:  ADULT DIET; Regular; 3 carb choices (45 gm/meal); Low Fat/Low Chol/High Fiber/2 gm Na;  Low Sodium (2 gm); 1200 ml  PPX:  SCDs  DISPO:  ICU  Hamzah Marshall MD

## 2023-02-09 NOTE — CONSULTS
[facility] Day:   ICU Day:                                                          Code:Full Code  Admit Date: 2/3/2023  PCP: No primary care provider on file. CC: Abdominal/back pain    HISTORY OF PRESENT ILLNESS:   Ms. Silvia Mayen is a 39-year-old  female with medical history significant for bipolar affective disorder, hepatitis C infection in 02/2020 treated with ledipasvir-sofosbuvir, essential hypertension, asthma, and h/o crystal meth use who presented to the ED with a one-week history of chills, sinus congestion, cough, dyspnea, abdominal pain, and low back and was admitted on 6/67/3937 for acute complicated cystitis with bilateral pleural effusions for which ceftriaxone and IVF were initiated. Methamphetamine was found on UDS. Patient reports that she hasn't used crystal meth within the last year; however, reports recent use of Adderall for ADHD. Patient had been refusing lab draws since 2/6; however, today, she was found to be increasingly lethargic and continued to have back pain with nausea. Patient finally allowed for lab draw which demonstrated acute liver injury. She was transferred to the ICU on 2/08/2023 for concerns of impending acute respiratory failure in the setting of lactic acidosis secondary to acute liver injury. This morning pt groaning in repsonse to any questions and initially refusing blood draws again, but amenable after discussion with overnight team. Discussed plan following initial rounds for lasix gtt, but that she would need to consent to either q12 blood draws or PICC line placement. Pt agreeable to PICC.      PAST HISTORY:     Past Medical History:   Diagnosis Date    Allergic rhinitis     Asthma     Bipolar 1 disorder ([facility] [address]     Bipolar affective disorder ([facility] [address]     Cigarette nicotine dependence     History of amphetamine dependence/abuse ([facility] [address]     Hypertension        Past Surgical History:   Procedure Laterality Date     SECTION         SocialHistory:   The patient has been living outside in a tent for the last month. Alcohol:  rare, social use  Illicit drugs:  crystal meth, marijuana  Tobacco:  started smoking cigarettes around , 0.5 to 1.5 PPD    Family History:  Family History   Problem Relation Age of Onset    Asthma Sister     Diabetes Maternal Grandmother     Heart Disease Maternal Grandmother        MEDICATIONS:     No current facility-administered medications on file prior to encounter.      Current Outpatient Medications on File Prior to Encounter   Medication Sig Dispense Refill    lurasidone (LATUDA) 40 MG TABS tablet Take 40 mg by mouth daily (with breakfast)      loratadine (CLARITIN) 10 MG tablet Take 10 mg by mouth daily      busPIRone (BUSPAR) 10 MG tablet Take 10 mg by mouth daily      albuterol sulfate HFA (PROVENTIL;VENTOLIN;PROAIR) 108 (90 Base) MCG/ACT inhaler 2 puffs q4h prn      labetalol (NORMODYNE) 200 MG tablet Take 200 mg by mouth daily      NIFEdipine (PROCARDIA XL) 90 MG extended release tablet Take 90 mg by mouth daily      VORTIoxetine (TRINTELLIX) 10 MG TABS tablet Take 10 mg by mouth daily      ibuprofen (ADVIL;MOTRIN) 800 MG tablet Take 1 tablet by mouth every 8 hours as needed for Pain or Fever 30 tablet 2    calcium carbonate (TUMS) 500 MG chewable tablet Take 1 tablet by mouth daily      Prenatal MV-Min-Fe Fum-FA-DHA (PRENATAL 1 PO) Take by mouth (Patient not taking: Reported on 2023)           Scheduled Meds:   [Held by provider] lurasidone  20 mg Oral Daily    lactulose  20 g Oral Q2H    polyethylene glycol  17 g Oral Daily    sodium chloride flush  10 mL IntraVENous 2 times per day    nicotine  1 patch TransDERmal Daily      Continuous Infusions:   sodium chloride 5 mL/hr at 23 2215     PRN Meds:docusate sodium, morphine, perflutren lipid microspheres, LORazepam, hydrOXYzine HCl, sodium chloride flush, sodium chloride, potassium chloride, magnesium sulfate, promethazine **OR** ondansetron    Allergies: Allergies   Allergen Reactions    Latex Anaphylaxis    Bee Venom Anaphylaxis    Pcn [Penicillins] Hives    Amoxil [Amoxicillin] Rash    Sulfa Antibiotics Rash       REVIEW OF SYSTEMS:       History obtained from the patient    Review of Systems   Constitutional:  Positive for fatigue. Negative for chills, diaphoresis and fever. Respiratory:  Negative for cough and shortness of breath. Cardiovascular:  Negative for chest pain and leg swelling. Gastrointestinal:  Negative for abdominal pain, constipation, diarrhea, nausea and vomiting. Genitourinary:  Negative for dysuria. Neurological:  Negative for dizziness, light-headedness and headaches. All other systems reviewed and are negative. PHYSICAL EXAM:       Vitals: BP (!) 136/109   Pulse (!) 117   Temp 99 °F (37.2 °C) (Temporal)   Resp 10   Ht 5' 3\" (1.6 m)   Wt 175 lb 7.8 oz (79.6 kg)   SpO2 100%   BMI 31.09 kg/m²     I/O:    Intake/Output Summary (Last 24 hours) at 2/9/2023 0810  Last data filed at 2/9/2023 0759  Gross per 24 hour   Intake 2437 ml   Output --   Net 2437 ml     I/O this shift:  In: 240 [P.O.:240]  Out: -   I/O last 3 completed shifts: In: 5050.2 [P.O.:2597; I.V.:2253.2; IV Piggyback:200]  Out: -     Physical Examination:     Physical Exam  Vitals and nursing note reviewed. Constitutional:       Appearance: Normal appearance. She is obese. She is not toxic-appearing or diaphoretic. Comments: Appears uncomfortable in bed; groaning in response to questions but answers appropriately after repeated questioning. HENT:      Head: Normocephalic. Nose: Nose normal.      Mouth/Throat:      Mouth: Mucous membranes are moist.      Pharynx: Oropharynx is clear. Cardiovascular:      Rate and Rhythm: Regular rhythm. Tachycardia present. Pulses: Normal pulses. Heart sounds: Normal heart sounds. No murmur heard. No friction rub. No gallop.    Pulmonary: Effort: Pulmonary effort is normal. Tachypnea present. No respiratory distress. Breath sounds: Normal breath sounds. No wheezing, rhonchi or rales. Abdominal:      General: Abdomen is flat. Palpations: Abdomen is soft. Tenderness: There is no guarding or rebound. Comments: Mild to moderate tenderness throughout greatest in RUQ   Musculoskeletal:      Right lower leg: Edema (trace) present. Left lower leg: Edema (trace) present. Skin:     General: Skin is warm and dry. Coloration: Skin is not pale. Neurological:      Mental Status: She is alert and oriented to person, place, and time. Psychiatric:      Comments: Anxious             DATA:       Labs:  CBC:   Recent Labs     02/08/23  1517   WBC 13.9*   HGB 11.3*   HCT 37.0          BMP:   Recent Labs     02/08/23  1517   *   K 4.3      CO2 17*   BUN 18   CREATININE 0.7   GLUCOSE 145*     LFT's:   Recent Labs     02/08/23  1517   AST 1,254*   ALT 1,101*   BILITOT 0.7   ALKPHOS 120     Troponin: No results for input(s): TROPONINI in the last 72 hours. BNP:No results for input(s): BNP in the last 72 hours. ABGs: No results for input(s): PHART, [id number], PO2ART in the last 72 hours. INR:   Recent Labs     02/08/23  1828   INR 2.30*       U/A:No results for input(s): NITRITE, COLORU, PHUR, LABCAST, WBCUA, RBCUA, MUCUS, TRICHOMONAS, YEAST, BACTERIA, CLARITYU, SPECGRAV, LEUKOCYTESUR, UROBILINOGEN, BILIRUBINUR, BLOODU, GLUCOSEU, AMORPHOUS in the last 72 hours. Invalid input(s): KETONESU    CT CHEST ABDOMEN PELVIS W CONTRAST Additional Contrast? None   Final Result      1. There is edema identified within the fat planes of the neck as well as within the subcutaneous fat about the thorax. There is also edema within the superior mediastinum. Correlate for anasarca-third spacing. 2. Large layering right-sided pleural effusion is increased from the prior study.  There is a small layering left pleural effusion which is mildly increased from the prior study. 3. There is some compressive atelectasis within dependent portion of right lower lobe. Linear atelectasis is present with the right middle lobe and lingula. 4. There are multiple venous collaterals identified about the left shoulder girdle. This is nonspecific. The SVC, left innominate vein, left subclavian vein and left axillary vein are patent. 5. There is some enlargement of the left ventricle, right ventricle and right atrium. Correlate for cardiomyopathy. This could be better evaluated with echocardiogram.      ABDOMEN AND PELVIS: There is subcutaneous edema identified about the abdomen and pelvis. Note should be made that the inferior pelvis was not included within the field-of-view. There is a small amount of free fluid identified with the right paracolic gutter, Morison's pouch as well as the pelvic cul-de-sac. Liver attenuation is decreased. There is some gallbladder wall edema. Pancreas, spleen and adrenal glands are normal. Renal size and enhancement is normal.      No bowel dilatation or bowel wall thickening is identified. Note should be made that some small bowel loops as well as the cecum were not entirely included within the field-of-view due to incomplete imaging through the pelvis. No adnexal mass is identified. No lymphadenopathy is identified. IMPRESSION:      1. Subcutaneous edema is identified about the abdomen and pelvis. This can be seen with anasarca-third spacing. 2. There is gallbladder wall edema. This is nonspecific and may be related to volume overload. Cholecystitis cannot entirely be excluded. 3. Liver attenuation is decreased. This may reflect hepatic steatosis or hepatitis. Correlate with liver function tests. 4. Small amount of free fluid is identified within the abdomen and pelvis. CT HEAD WO CONTRAST   Final Result   1. No acute intracranial process.       CT ABDOMEN PELVIS WO CONTRAST Additional Contrast? None   Final Result      1. Small to moderate right pleural effusion and small left pleural effusion with subsegmental atelectasis. No visible pneumonia. 2. Mild mesenteric and retroperitoneal edema, trace ascites and periportal edema. Hepatocellular disease is a possibility. Correlate with liver function tests. XR CHEST (2 VW)   Final Result      No evidence for acute cardiopulmonary disease. ASSESSMENT AND PLAN:   Ms. Chris Vegas is a 26-year-old  female who presented to the ED with a one-week history of chills, sinus congestion, cough, dyspnea, abdominal pain, and low back and was admitted on 2/04/2023 for acute urosepsis with bilateral pleural effusions. She was transferred to the ICU on 2/08/2023 for concerns of impending acute respiratory failure in the setting of lactic acidosis secondary to acute liver injury. Acute liver injury - Likely 2/2 HBV infection with HBsAg (+) on 2/3. History of acute hepatitis C infection - Treated with ledipasvir-sofosbuvir (genotype 1) in 02/2020 at [facility]. FU HCV quantitative RNA by PCR without any copies on 6/18/2020. Lactic acidosis, type B - likely 2/2 acute liver injury  VBG (2/8, 5pm):  pH 7.303, pCO2 40.8, bicarb 20.2   - Anti-HBc, HBeAg, HBV DNA - pending   - F-actin Ab, THOR, IgM, IgG, IgA, HIV screen, HepC RNA PCR - pending collection to further assess for alternative etiologies of the acute liver failure  - MELD-Na score of 20. Referral to  liver transplant center - bed availability pending; Poor transplant candidate, wait time is > 2 days.  - INR Q4H to monitor for decompensation  - Vit K 10 mg IV administered on 2/8  - LFTs QD     Moderate pericardial effusion - seen on CT scan 2/8  Dilated LA/LV/RV - seen on CT scan 2/8 - possibly DCM 2/2 chronic methamphetamine use  History of crystal methamphetamine use - UDS on 2/3 positive for methamphetamine. Patient reports recent Adderall use.   - Cardiology consulted   - TTE w/ EF 15%  - Continuous monitoring on telemetry  - Lasix gtt pending IV access, pt agreement to q12 labs     Large layering right-sided pleural effusion - possibly 2/2 acute cardiac vs acute liver failure - worsening since admission  Small layering left-sided pleural effusion - possibly 2/2 acute cardiac vs acute liver failure - slight worsening since admission  - Monitor closely in the ICU for decompensation necessitating intubation. Patient has confirmed she would like to be intubated if needed. - Lasix 20 mg IV in ED  - Lasix gtt pending IV access, pt agreement to q12 labs     Acute back pain  - Consider MRI spine to assess for possible osteomyelitis in patient with h/o IVDU     Acute complicated cystitis (POA) - concerns for urosepsis  Urine Cx 2/3:  S. agalactiae < 10,000 CFU/mL mixed skin/urogenital cruzito  - CTX started on 2/3  - BCx x 2 collected. BCx 1 - NGTD. - Procalcitonin pending  - Strict I/Os  - Daily weights     Essential hypertension - uncontrolled  - Hold home meds labetalol, nifedipine  - Consider restarting labetalol after IV Lasix     Asthma, mild  - Albuterol PRN     Bipolar affective disorder  History of bulimia nervosa  - Psychiatry consulted  - Luciano Rodriguez in setting of acute liver failure     Nicotine dependence  - Nicotine patch       Code Status:  Full Code  FEN:  ADULT DIET; Regular; 3 carb choices (45 gm/meal); Low Fat/Low Chol/High Fiber/2 gm Na; Low Sodium (2 gm)  PPX:  SCDs  DISPO:  ICU    This patient has been staffed and discussed with Shantal Nguyen MD  -----------------------------  Issa lBank MD, PGY-1  2/9/2023  8:10 AM  Patient examined, findings as reviewed with Dr. Jomar Louis. Agree with assessment and plan. Liver injury may be secondary to hepatitis B, worsening cardiac failure. Not apparently septic. Lactic acidosis secondary to hepatic failure.   Furosemide infusion instituted because of marked fluid volume overload with cardiac failure. Midline catheter placed to enable adequate electrolyte and other laboratory monitoring without subjecting her to frequent phlebotomies.

## 2023-02-09 NOTE — PROGRESS NOTES
Transfer to ICU       Code:  Full Code  PCP:  No primary care provider on file. Admit Date:  2/3/2023                              Hospital Day:  5  ICU Day:  1  Vent Day:  n/a  LDAs:  peripheral    Continuous Infusions:  none  Vasopressors:  none  Antibiotics:   CTX (2/3-)  Diet:  ADULT DIET; Regular; 3 carb choices (45 gm/meal); Low Fat/Low Chol/High Fiber/2 gm Na; Low Sodium (2 gm)    CC: abdominal and back pain    History obtained from:  chart review and the patient    Brief HPI & Hospital Course:  Ms. Mariela Gutierrez is a 50-year-old  female with medical history significant for bipolar affective disorder, hepatitis C infection in 2020 treated with ledipasvir-sofosbuvir, essential hypertension, asthma, and h/o crystal meth use who presented to the ED with a one-week history of chills, sinus congestion, cough, dyspnea, abdominal pain, and low back and was admitted on  for acute complicated cystitis with bilateral pleural effusions for which ceftriaxone and IVF were initiated. Methamphetamine was found on UDS. Patient reports that she hasn't used crystal meth within the last year; however, reports recent use of Adderall for ADHD. Patient had been refusing lab draws since ; however, today, she was found to be increasingly lethargic and continued to have back pain with nausea. Patient finally allowed for lab draw which demonstrated acute liver injury. She was transferred to the ICU on 2023 for concerns of impending acute respiratory failure in the setting of lactic acidosis secondary to acute liver injury.       Past Medical History:   Diagnosis Date    Allergic rhinitis     Asthma     Bipolar 1 disorder ([address]     Bipolar affective disorder ([address]     Cigarette nicotine dependence     History of amphetamine dependence/abuse [address] [address]     Hypertension        Past Surgical History:   Procedure Laterality Date     SECTION         Social History:   The patient has been living outside in a tent for the last month. Alcohol:  rare, social use  Illicit drugs:  crystal meth, marijuana  Tobacco:  started smoking cigarettes around 2010, 0.5 to 1.5 PPD    Family History   Problem Relation Age of Onset    Asthma Sister     Diabetes Maternal Grandmother     Heart Disease Maternal Grandmother          MEDICATIONS:  No current facility-administered medications on file prior to encounter. Current Outpatient Medications on File Prior to Encounter   Medication Sig Dispense Refill    lurasidone (LATUDA) 40 MG TABS tablet Take 40 mg by mouth daily (with breakfast)      loratadine (CLARITIN) 10 MG tablet Take 10 mg by mouth daily      busPIRone (BUSPAR) 10 MG tablet Take 10 mg by mouth daily      albuterol sulfate HFA (PROVENTIL;VENTOLIN;PROAIR) 108 (90 Base) MCG/ACT inhaler 2 puffs q4h prn      labetalol (NORMODYNE) 200 MG tablet Take 200 mg by mouth daily      NIFEdipine (PROCARDIA XL) 90 MG extended release tablet Take 90 mg by mouth daily      VORTIoxetine (TRINTELLIX) 10 MG TABS tablet Take 10 mg by mouth daily      ibuprofen (ADVIL;MOTRIN) 800 MG tablet Take 1 tablet by mouth every 8 hours as needed for Pain or Fever 30 tablet 2    calcium carbonate (TUMS) 500 MG chewable tablet Take 1 tablet by mouth daily      Prenatal MV-Min-Fe Fum-FA-DHA (PRENATAL 1 PO) Take by mouth (Patient not taking: Reported on 2/4/2023)         Scheduled Meds:     [Held by provider] lurasidone  20 mg Oral Daily    lactulose  20 g Oral Q2H    phytonadione (VITAMIN K) IVPB  10 mg IntraVENous NOW    polyethylene glycol  17 g Oral Daily    sodium chloride flush  10 mL IntraVENous 2 times per day    nicotine  1 patch TransDERmal Daily      Continuous Infusions:     sodium chloride      sodium chloride 100 mL/hr at 02/08/23 0557       Allergies:    Allergies   Allergen Reactions    Latex Anaphylaxis    Bee Venom Anaphylaxis    Pcn [Penicillins] Hives    Amoxil [Amoxicillin] Rash    Sulfa Antibiotics Rash       Review of Systems   Constitutional:  Positive for fever. Negative for appetite change, chills and diaphoresis. HENT:  Negative for congestion, rhinorrhea and sore throat. Respiratory:  Negative for cough, chest tightness and shortness of breath. Cardiovascular:  Negative for chest pain. Gastrointestinal:  Positive for nausea. Negative for abdominal pain, constipation and vomiting. Endocrine: Positive for polydipsia. Genitourinary:  Negative for difficulty urinating and dysuria. Musculoskeletal:  Positive for back pain. Skin:         Denies pruritus    Neurological:  Negative for dizziness, light-headedness and headaches. Vitals:    02/08/23 1345 02/08/23 1721 02/08/23 2010 02/08/23 2100   BP: (!) 165/92 (!) 131/97 (!) 131/95    Pulse: (!) 116 (!) 127 (!) 127    Resp: 22 (!) 42 (!) 48    Temp: 97.4 °F (36.3 °C) 97.3 °F (36.3 °C) 98.6 °F (37 °C) 98.6 °F (37 °C)   TempSrc: Oral Oral Oral Oral   SpO2: 98% 100% 99%    Weight:       Height:           I/O:      Intake/Output Summary (Last 24 hours) at 2/8/2023 2200  Last data filed at 2/8/2023 1731  Gross per 24 hour   Intake 4370.21 ml   Output --   Net 4370.21 ml     No intake/output data recorded. I/O last 3 completed shifts: In: 4570.2 [P.O.:2117; I.V.:2253.2; IV Piggyback:200]  Out: -       PHYSICAL EXAM:  Constitutional:  Female, appears as stated age, restless, rocking side-to-side in bed. HEENT:  EOMI by observation. No lacrimation, scleral icterus, or conjunctival injection. No rhinorrhea. Dry lower lip with midline fissure and scant dried blood. Moist, pink oral mucous membranes. Cardiovascular:  No apparent JVD. Tachycardia. Regular rhythm. No murmurs, rubs, or gallops. Radial pulses 2+. Capillary refill < 2 sec. Pulmonary:  No perioral cyanosis. Supraclavicular retractions, shallow breaths, tachypneic. CTAB. Abdomen:  Distended. Normoactive bowel sounds. Soft. TTP over epigastric region.   Musculoskeletal:  No upper extremity edema. Trace bilateral lower extremity edema extending proximally to mid-lower legs. No evidence of venous stasis changes. Skin:  Warm, dry, no jaundice. Neurological:  Alert, awake, oriented to person, time, place, and situation. No asterixis. No tremors. Psychological:  Normal attention. Anxious affect. Speech intermittently slurred. Hyperactive. LABS:  ABGs:  No results for input(s): PHART, LZO4MMU, PO2ART in the last 72 hours. VBGs:    Recent Labs     02/08/23  1700   PHVEN 7.303*   MSU1XWW 40.8*   PO2VEN <30.0     Recent Labs     02/08/23  1517   WBC 13.9*   HGB 11.3*   HCT 37.0          Recent Labs     02/08/23  1517   *   K 4.3      CO2 17*   BUN 18   CREATININE 0.7   GLUCOSE 145*     Component Ref Range & Units 2/8/23 1517 2/5/23 1242 2/5/23 1242 2/3/23 1949   Sodium 136 - 145 mmol/L 133 Low    132 Low   138    Potassium reflex Magnesium 3.5 - 5.1 mmol/L 4.3   5.2 High   4.8    Chloride 99 - 110 mmol/L 103   104  102    CO2 21 - 32 mmol/L 17 Low    19 Low   24    Anion Gap 3 - 16 13   9  12    Glucose 70 - 99 mg/dL 145 High    124 High   107 High     BUN 7 - 20 mg/dL 18   23 High   15    Creatinine 0.6 - 1.1 mg/dL 0.7   0.8  0.6    Est, Glom Filt Rate >60 >60   >60 CM  >60 CM    Calcium 8.3 - 10.6 mg/dL 8.1 Low    8.3  9.0    Total Protein 6.4 - 8.2 g/dL 5.1 Low   5.3 Low    6.7    Albumin 3.4 - 5.0 g/dL 3.0 Low   3.2 Low    3.8    Albumin/Globulin Ratio 1.1 - 2.2 1.4    1.3    Total Bilirubin 0.0 - 1.0 mg/dL 0.7  0.7   0.9    Alkaline Phosphatase 40 - 129 U/L 120  113   128    ALT 10 - 40 U/L 1,101 High   147 High    107 High     AST 15 - 37 U/L 1,254 High   130 High    55 High     Resulting Agency  [address] [facility] [address] [facility]      Recent Labs     02/08/23  1517   AST 1,254*   ALT 1,101*   BILITOT 0.7   ALKPHOS 120     No results for input(s): TROPONINI in the last 72 hours.   No results for input(s): PROBNP in the last 72 hours. Recent Labs     02/08/23  1828   INR 2.30*       Urinalysis: No results for input(s): NITRITE, COLORU, PHUR, LABCAST, WBCUA, RBCUA, MUCUS, TRICHOMONAS, YEAST, BACTERIA, CLARITYU, SPECGRAV, LEUKOCYTESUR, UROBILINOGEN, BILIRUBINUR, BLOODU, GLUCOSEU, AMORPHOUS in the last 72 hours. Invalid input(s): KETONEU      MICROBIOLOGY:   Component 2/3/23 1949    Urine Culture, Routine <10,000 CFU/ml mixed skin/urogenital cruzito. No further workup Abnormal     Organism Strep agalactiae (Beta Strep Group B) Abnormal     Urine Culture, Routine <10,000 CFU/ml   Susceptibility testing of penicillin and other beta lactams is not necessary for beta hemolytic Streptococci since resistant strains have not been identified. IMMUNOLOGY/SEROLOGY:  Component Ref Range & Units 2/3/23 1945    Hep A IgM Non-reactive Non-reactive    Hep B Core Ab, IgM Non-reactive Non-reactive    Hep B S Ag Interp Non-reactive Reactive Abnormal     Comment: REPEATEDLY REACTIVE - Confirmation to follow   Hep C Ab Interp Non-reactive REACTIVE Abnormal     Comment: REACTIVE Screen:   Confirmation with Hepatitis C RIBA not available. Depending on clinical history, Hepatitis C Virus (HCV) by Quantitative NAAT ([id number]) should be considered as an alternative to this test although it is not an equivalent. If HCV by Quantitative NAAT is desired, please reorder. A new sample may be needed due to specimen requirements for HCV by Quantitative NAAT. RADIOLOGY:  CT CHEST ABDOMEN PELVIS W CONTRAST Additional Contrast? None   Final Result      1. There is edema identified within the fat planes of the neck as well as within the subcutaneous fat about the thorax. There is also edema within the superior mediastinum. Correlate for anasarca-third spacing. 2. Large layering right-sided pleural effusion is increased from the prior study. There is a small layering left pleural effusion which is mildly increased from the prior study. 3. There is some compressive atelectasis within dependent portion of right lower lobe. Linear atelectasis is present with the right middle lobe and lingula. 4. There are multiple venous collaterals identified about the left shoulder girdle. This is nonspecific. The SVC, left innominate vein, left subclavian vein and left axillary vein are patent. 5. There is some enlargement of the left ventricle, right ventricle and right atrium. Correlate for cardiomyopathy. This could be better evaluated with echocardiogram.      ABDOMEN AND PELVIS: There is subcutaneous edema identified about the abdomen and pelvis. Note should be made that the inferior pelvis was not included within the field-of-view. There is a small amount of free fluid identified with the right paracolic gutter, Morison's pouch as well as the pelvic cul-de-sac. Liver attenuation is decreased. There is some gallbladder wall edema. Pancreas, spleen and adrenal glands are normal. Renal size and enhancement is normal.      No bowel dilatation or bowel wall thickening is identified. Note should be made that some small bowel loops as well as the cecum were not entirely included within the field-of-view due to incomplete imaging through the pelvis. No adnexal mass is identified. No lymphadenopathy is identified. IMPRESSION:      1. Subcutaneous edema is identified about the abdomen and pelvis. This can be seen with anasarca-third spacing. 2. There is gallbladder wall edema. This is nonspecific and may be related to volume overload. Cholecystitis cannot entirely be excluded. 3. Liver attenuation is decreased. This may reflect hepatic steatosis or hepatitis. Correlate with liver function tests. 4. Small amount of free fluid is identified within the abdomen and pelvis. CT HEAD WO CONTRAST   Final Result   1. No acute intracranial process. CT ABDOMEN PELVIS WO CONTRAST Additional Contrast? None   Final Result      1.  Small to moderate right pleural effusion and small left pleural effusion with subsegmental atelectasis. No visible pneumonia. 2. Mild mesenteric and retroperitoneal edema, trace ascites and periportal edema. Hepatocellular disease is a possibility. Correlate with liver function tests. XR CHEST (2 VW)   Final Result      No evidence for acute cardiopulmonary disease. ASSESSMENT & PLAN:  Ms. Kylah Jackson is a 29-year-old  female who presented to the ED with a one-week history of chills, sinus congestion, cough, dyspnea, abdominal pain, and low back and was admitted on 2/04/2023 for acute urosepsis with bilateral pleural effusions. She was transferred to the ICU on 2/08/2023 for concerns of impending acute respiratory failure in the setting of lactic acidosis secondary to acute liver injury. Acute liver injury - Likely 2/2 HBV infection with HBsAg (+) on 2/3. History of acute hepatitis C infection - Treated with ledipasvir-sofosbuvir (genotype 1) in 02/2020 at [facility]. FU HCV quantitative RNA by PCR without any copies on 6/18/2020. Lactic acidosis, type B - likely 2/2 acute liver injury  VBG (2/8, 5pm):  pH 7.303, pCO2 40.8, bicarb 20.2   Anti-HBc, HBeAg, HBV DNA - pending to complete assessment for acute HBV infection  F-actin Ab, THOR, IgM, IgG, IgA, HIV screen, HepC RNA PCR - pending collection to further assess for alternative etiologies of the acute liver failure  MELD-Na score of 20. Referral to  liver transplant center - bed availability pending; wait time is > 2 days. INR Q4H to monitor for decompensation  Vit K 10 mg IV administered on 2/8  LFTs QD    Moderate pericardial effusion - seen on CT scan 2/8  Dilated LA/LV/RV - seen on CT scan 2/8 - possibly DCM 2/2 chronic methamphetamine use  History of crystal methamphetamine use - UDS on 2/3 positive for methamphetamine. Patient reports recent Adderall use.   Cardiology consulted   TTE pending   Continuous monitoring on telemetry    Large layering right-sided pleural effusion - possibly 2/2 acute cardiac vs acute liver failure - worsening since admission  Small layering left-sided pleural effusion - possibly 2/2 acute cardiac vs acute liver failure - slight worsening since admission  Patient is currently saturating well on RA; however, RR is sustaining in 30s-40s. Monitor closely in the ICU for decompensation necessitating intubation. Patient has confirmed she would like to be intubated if needed. Lasix 20 mg IV     Acute back pain - persistent  MRI spine to assess for possible osteomyelitis in patient with h/o IVDU    Acute complicated cystitis (POA) - concerns for urosepsis  Urine Cx 2/3:  S. agalactiae < 10,000 CFU/mL mixed skin/urogenital cruzito  CTX started on 2/3  BCx x 2 collected. BCx 1 - NGTD. Procalcitonin pending  Strict I/Os  Daily weights    Essential hypertension - uncontrolled  Hold home meds labetalol, nifedipine  Consider restarting labetalol after IV Lasix    Asthma, mild  Albuterol PRN    Bipolar affective disorder  History of bulimia nervosa  Psychiatry consulted  Judy Hough in setting of acute liver failure    Nicotine dependence  Nicotine patch        Code Status:  Full Code  FEN:  ADULT DIET; Regular; 3 carb choices (45 gm/meal); Low Fat/Low Chol/High Fiber/2 gm Na; Low Sodium (2 gm)  PPX:  SCDs  DISPO:  ICU      Radha Murphy MD, [facility]  Internal Medicine, PGY-1  2/8/2023, 10:00 PM

## 2023-02-09 NOTE — PROGRESS NOTES
After many attempts, patient has refused morning labs. Bed is in lowest position with wheels locked and bed alarm active. Call light is within reach.

## 2023-02-09 NOTE — PROGRESS NOTES
ECHO completed at bedside. Pt refused lactulose enema. States she will take scheduled lactulose PO today. Active bowel sounds, BM x1 this shift - soft/ formed. Sitting in bed, eating breakfast. 1200 ml fluid restriction.

## 2023-02-09 NOTE — CONSULTS
[facility]  Cardiology Inpatient Consult Service                                                                                          Pt Name: Rj Darby  Age: 28 y.o. Sex: female  : 1995  Location: [address] [place] [address]    Referring Physician: Yordan House MD    Reason for Consult:     Reason for Consultation/Chief Complaint: concern for CM, endocarditis pt with previous IVDU, CT scan demonstrating moderate pericardial effusion, dilated LV/RA/RV, ekg with right axis    HPI:      Rj Darby is a 28 y.o. female with a past medical history of bipolar affective disorder, hepatitis C infection in 2020 treated with ledipasvir-sofosbuvir, essential hypertension, asthma, and h/o crystal meth use who presented to the ED with a one-week history of chills, sinus congestion, cough, dyspnea, abdominal pain, and low back and was admitted on 4666 for acute complicated cystitis with bilateral pleural effusions for which ceftriaxone and IVF were initiated. Her hospital stay was complicated by continued tachycardia, was thought of as due to amphetamine withdrawal, however pt denied IVDU. UDS was positive for methamphetamine, pt said she was taking adderall off the street. Pt has been refusing lab draws since , she was found to be increasingly lethargic and continued to have back pain and nausea. Lab work on  showed sodium 133 bicarb of 17 lactic at 6.1 significant elevation in transaminitis with 1101 ALT 1254 AST, INR 2.3. CT chest abd pelvis with con  \"Subcutaneous edema present in the neck, superior mediastinum anasarca concerning for third spacing, large layering right-sided pleural effusion, small layering left-sided pleural effusion, bilateral atelectasis.   Venous collaterals were noted SVC, left innominate vein left subclavian vein and left axillary veins were noted to be patent  Cardiac chambers including left ventricular right ventricle right atrium enlargement concerning for cardiomyopathy  Free fluid seen in the paracolic gutters, Morison pouch  Liver attenuation is decreased, gallbladder wall edema present no bowel wall thickening was noted\"    HepB surface ag positive, concern for fulminant hep B. Per chart review and per patient report she has been treated for hep C in 2019 at [facility]. GI onboard, hospitalist discussed with transplant hepatology at [facility] who feels pt has acute liver injury than acute liver failure. Unable to transfer to  as lack of beds    Cardiology was consulted as pt has biventricular and right atrial enlargement, heart appears globular. Also has mod pericardial effusion, bilateral pleural effusion. Interval Hx  Saw pt at bedside, was getting her echo done. Pt was not cooperative during my interview, reported pain in her ears and did not participate in interview. Denied any chest pain      Histories     Past Medical History:   has a past medical history of Allergic rhinitis, Asthma, Bipolar 1 disorder ([address] Bipolar affective disorder ([address] Cigarette nicotine dependence, History of amphetamine dependence/abuse ([facility] [address] and Hypertension. Surgical History:   has a past surgical history that includes  section. Social History:   reports that she has been smoking cigarettes. She has been smoking an average of .5 packs per day. She has never used smokeless tobacco. She reports current drug use. Drug: Methamphetamines (Crystal Meth). She reports that she does not drink alcohol. Family History:  No evidence for sudden cardiac death or premature CAD    Medications:     Home Medications  Were reviewed and are listed in nursing record. and/or listed below  Prior to Admission medications    Medication Sig Start Date End Date Taking?  Authorizing Provider   lurasidone (LATUDA) 40 MG TABS tablet Take 40 mg by mouth daily (with breakfast) 22  Yes Historical Provider, MD   loratadine (CLARITIN) 10 MG tablet Take 10 mg by mouth daily 1/10/22  Yes Historical Provider, MD   busPIRone (BUSPAR) 10 MG tablet Take 10 mg by mouth daily 6/4/21  Yes Historical Provider, MD   albuterol sulfate HFA (PROVENTIL;VENTOLIN;PROAIR) 108 (90 Base) MCG/ACT inhaler 2 puffs q4h prn 2/13/20  Yes Historical Provider, MD   labetalol (NORMODYNE) 200 MG tablet Take 200 mg by mouth daily 10/1/22  Yes Historical Provider, MD   NIFEdipine (PROCARDIA XL) 90 MG extended release tablet Take 90 mg by mouth daily 10/1/22  Yes Historical Provider, MD   VORTIoxetine (TRINTELLIX) 10 MG TABS tablet Take 10 mg by mouth daily 1/11/22  Yes Historical Provider, MD   ibuprofen (ADVIL;MOTRIN) 800 MG tablet Take 1 tablet by mouth every 8 hours as needed for Pain or Fever 8/21/17   Kyaw Rangel,    calcium carbonate (TUMS) 500 MG chewable tablet Take 1 tablet by mouth daily    Historical Provider, MD   Prenatal MV-Min-Fe Fum-FA-DHA (PRENATAL 1 PO) Take by mouth  Patient not taking: Reported on 2/4/2023    Historical Provider, MD        Inpatient Medications:   lactulose enema   Rectal Once    [Held by provider] lurasidone  20 mg Oral Daily    lactulose  20 g Oral Q2H    polyethylene glycol  17 g Oral Daily    sodium chloride flush  10 mL IntraVENous 2 times per day    nicotine  1 patch TransDERmal Daily     IV drips:   furosemide (LASIX) 1mg/mL infusion      sodium chloride 5 mL/hr at 02/08/23 2215     PRN:  docusate sodium, morphine, perflutren lipid microspheres, LORazepam, hydrOXYzine HCl, sodium chloride flush, sodium chloride, potassium chloride, magnesium sulfate, promethazine **OR** ondansetron    Allergy:     Latex, Bee venom, Pcn [penicillins], Amoxil [amoxicillin], and Sulfa antibiotics     Review of Systems:     All 12 point review of symptoms completed. Pertinent positives identified in the HPI, all other review of symptoms negative as below.     Physical Examination:     Vitals:    02/09/23 0400 02/09/23 0500 02/09/23 0600 02/09/23 0753   BP: (!) 123/105 (!) 133/105 (!) 136/109    Pulse: (!) 118 (!) 114 (!) 117    Resp: (!) 36 (!) 34 10    Temp: 100 °F (37.8 °C)   99 °F (37.2 °C)   TempSrc: Oral   Temporal   SpO2: 100%      Weight:       Height:         Wt Readings from Last 3 Encounters:   02/09/23 175 lb 7.8 oz (79.6 kg)   08/10/21 144 lb 4 oz (65.4 kg)   08/18/17 155 lb (70.3 kg)     Objective:  General Appearance:  Uncomfortable and ill-appearing. Vital signs: (most recent): Blood pressure (!) 136/109, pulse (!) 117, temperature 99 °F (37.2 °C), temperature source Temporal, resp. rate 10, height 5' 3\" (1.6 m), weight 175 lb 7.8 oz (79.6 kg), SpO2 100 %, unknown if currently breastfeeding.  (tachycardic). Lungs:  Normal effort. She is not in respiratory distress. Heart: Tachycardia. No murmur. Abdomen: There is generalized tenderness. Extremities: Normal range of motion. There is local swelling (trace edema). (Moving all extremities spontaneously )  Neurological: Patient is alert. Pupils:  Pupils are equal, round, and reactive to light. Labs:     Recent Labs     02/08/23  1517   *   K 4.3   BUN 18   CREATININE 0.7      CO2 17*   GLUCOSE 145*   CALCIUM 8.1*     Recent Labs     02/08/23  1517 02/09/23  1028   WBC 13.9* 15.0*   HGB 11.3* 11.9*   HCT 37.0 38.6    406   MCV 84.1 84.9     Recent Labs     02/08/23  1514   TRIG 82     Recent Labs     02/08/23  1828   INR 2.30*     Recent Labs     02/08/23  1517   CKTOTAL 462*     No results for input(s): BNP in the last 72 hours. No results for input(s): TSH in the last 72 hours. Recent Labs     02/08/23  1514   TRIG 82   ]    Lab Results   Component Value Date    YQJQKNA 978 (H) 02/08/2023     Imaging:     I personally reviewed imaging studies including CXR, Stress test, TTE/KYM.     Last ECG (if available) -personally interpreted EKG:  I have reviewed EKG with the following interpretation  Sinus tachycardia with right axis deviation     Telemetry: Personally interpreted  Sinus tachy    Last Stress (if available):    Last TTE/KYM(if available):  Never had an echo prior     Last Cath (if available):    Last CMR  (if available):      Assessment / Plan:     Biatrial, biventricular enlargement on CT  Moderate sized pericardial effusion on CT  Gross anasarca   Hx of IVDU, concern for endocarditis  Gross anasarca  Pending official Echo read. EF appears to be severely depressed ~ 10% on preliminary read  No overt signs of infection  Never had ischemia workup in the past. However this could be tachycardia induced  Start lasix gtt @ 5 for diuresis  If BP tolerates, ok to add nitro gtt for afterload reduction       Acute liver failure  Transaminitis  Concern for fulminant HepB  GI onboard    Thank you for allowing to us to participate in the care or Savannah Love. Further evaluation will be based upon the patient's clinical course and testing results.     Will discuss with Dr Alicia Huynh MD  PGY3

## 2023-02-09 NOTE — PROGRESS NOTES
[address] [place] [address]  GI Progress Note          Henrik Hoskins is a 28 y.o. female patient. 1. Acute pyelonephritis    2. Bilateral pleural effusion    3. Septicemia ([address]        Admit Date: 2/3/2023    Subjective:       Transferred to ICU patient araceliley uncooperative with all aspects of care making assessment difficult. No GI bleeding N/V or current abd pain.  Only tool 1 dose of Lactulose    Scheduled Meds:   [Held by provider] lurasidone  20 mg Oral Daily    lactulose  20 g Oral Q2H    polyethylene glycol  17 g Oral Daily    sodium chloride flush  10 mL IntraVENous 2 times per day    nicotine  1 patch TransDERmal Daily       Continuous Infusions:   sodium chloride 5 mL/hr at 02/08/23 2215       PRN Meds:  docusate sodium, morphine, perflutren lipid microspheres, LORazepam, hydrOXYzine HCl, sodium chloride flush, sodium chloride, potassium chloride, magnesium sulfate, promethazine **OR** ondansetron      Objective:       Patient Vitals for the past 24 hrs:   BP Temp Temp src Pulse Resp SpO2 Weight   02/09/23 0753 -- 99 °F (37.2 °C) Temporal -- -- -- --   02/09/23 0600 (!) 136/109 -- -- (!) 117 10 -- --   02/09/23 0500 (!) 133/105 -- -- (!) 114 (!) 34 -- --   02/09/23 0400 (!) 123/105 100 °F (37.8 °C) Oral (!) 118 (!) 36 100 % --   02/09/23 0300 (!) 133/101 -- -- (!) 117 (!) 47 -- --   02/09/23 0200 (!) 141/118 -- -- (!) 120 (!) 39 -- --   02/09/23 0108 -- -- -- -- -- -- 175 lb 7.8 oz (79.6 kg)   02/09/23 0100 (!) 156/104 -- -- (!) 118 (!) 34 95 % --   02/09/23 0000 (!) 139/117 99 °F (37.2 °C) Oral (!) 121 (!) 50 100 % --   02/08/23 2300 (!) 139/115 -- -- (!) 121 (!) 40 -- --   02/08/23 2200 (!) 150/128 -- -- (!) 123 (!) 37 -- --   02/08/23 2100 (!) 136/101 98.6 °F (37 °C) Oral (!) 118 16 95 % --   02/08/23 2010 (!) 131/95 98.6 °F (37 °C) Oral (!) 127 (!) 48 99 % --   02/08/23 1721 (!) 131/97 97.3 °F (36.3 °C) Oral (!) 127 (!) 42 100 % --   02/08/23 1345 (!) 165/92 97.4 °F (36.3 °C) Oral (!) 116 22 98 % --   23 0942 (!) 132/99 98.3 °F (36.8 °C) Oral (!) 104 28 99 % --       Exam:  VITALS:  BP (!) 136/109   Pulse (!) 117   Temp 99 °F (37.2 °C) (Temporal)   Resp 10   Ht 5' 3\" (1.6 m)   Wt 175 lb 7.8 oz (79.6 kg)   SpO2 100%   BMI 31.09 kg/m²   TEMPERATURE:  Current - Temp: 99 °F (37.2 °C); Max - Temp  Av.5 °F (36.9 °C)  Min: 97.3 °F (36.3 °C)  Max: 100 °F (37.8 °C)    NAD  General appearance: confused, not cooperative  Head: Normocephalic, without obvious abnormality, atraumatic  Abdomen: soft, non-tender; bowel sounds normal; no masses,  no organomegaly      Recent Labs     23  1517   WBC 13.9*   HGB 11.3*   HCT 37.0   MCV 84.1        Recent Labs     23  1517   *   K 4.3      CO2 17*   BUN 18   CREATININE 0.7     Recent Labs     23  1517   AST 1,254*   ALT 1,101*   BILITOT 0.7   ALKPHOS 120     Recent Labs     23  1514   LIPASE 32.0     Recent Labs     23  1517 23  1828   PROT 5.1*  --    INR  --  2.30*     Radiology review:   Impression:       1. There is edema identified within the fat planes of the neck as well as within the subcutaneous fat about the thorax. There is also edema within the superior mediastinum. Correlate for anasarca-third spacing. 2. Large layering right-sided pleural effusion is increased from the prior study. There is a small layering left pleural effusion which is mildly increased from the prior study. 3. There is some compressive atelectasis within dependent portion of right lower lobe. Linear atelectasis is present with the right middle lobe and lingula. 4. There are multiple venous collaterals identified about the left shoulder girdle. This is nonspecific. The SVC, left innominate vein, left subclavian vein and left axillary vein are patent. 5. There is some enlargement of the left ventricle, right ventricle and right atrium. Correlate for cardiomyopathy.  This could be better evaluated with echocardiogram.      HEAD CT SCAN WITHOUT CONTRAST         HISTORY: mental status change       COMPARISON:  None. TECHNIQUE: Noncontrast CT images of the head were obtained. Images were reformatted in the coronal and sagittal planes. Up-to-date CT equipment and radiation dose reduction techniques were employed. FINDINGS: The ventricles, sulci and cisterns are within normal limits for size and configuration. No intracranial hemorrhage is identified. No mass or mass-effect is identified. The gray-white differentiation is intact. There is no CT evidence of acute    ischemia. There is a 15 mm mucous retention cyst present within the right maxillary sinus. . No skull fracture is identified. Impression   1. No acute intracranial process. Assessment:       Principal Problem:    Acute pyelonephritis  Active Problems:    Pyelonephritis  Resolved Problems:    * No resolved hospital problems. *    Now knowing that she may have significant cardiomyopathy ischemic hepatitis more a possibility, although no documented hypotension but has been tachycardic, acute hep B, autoimmune also considered.    Mental status changes and coagulapathy poor prognostic signs, only took 1 dose of Lactulose, NG seems not to be an option, will try one Lactulose enema    Unfortunately her care is dramatically effected by her inability to cooperate with medical care, refusing labs, medications etc. Difficult to assess progress without labs    Rec ECHO and awaiting the serologies obtained last PM    Purnima Hitchcock MD  2/9/2023  8:15 AM

## 2023-02-09 NOTE — PROGRESS NOTES
Pt A/Ox4, follows commands. Pupils equal and reactive. Appears restless. Flat affect. Denies pain/ anxiety. Sinus tachy, HR 110s, -150s. Room air, clear breath sounds. RR 30s. Bowel sounds active. Endorses nausea, refusing zofran at this time. Temp: 99.0. Lactulose given per order. Call light in reach. Bed alarm on, bed locked/ in lowest position. Pt educated on calling RN prior to getting up/ plan of the day. Lab called about morning labs, stated they will send someone when available.

## 2023-02-09 NOTE — PLAN OF CARE
Problem: Safety - Adult  Goal: Free from fall injury  2/9/2023 0804 by Angela Denise RN  Outcome: Progressing     Problem: ABCDS Injury Assessment  Goal: Absence of physical injury  2/9/2023 0804 by Angela Denise RN  Outcome: Progressing     Problem: Pain  Goal: Verbalizes/displays adequate comfort level or baseline comfort level  2/9/2023 0804 by Angela Denise RN  Outcome: Progressing

## 2023-02-09 NOTE — PROGRESS NOTES
Pt picked up for CT scan and will be dropped back off to PCU. Belongings brought to PCU and report given to RN.

## 2023-02-09 NOTE — PROCEDURES
Order noted for midline, went to pt room to place line, pt lying on side screaming \"I want knocked out for this, then I dont want it\"  Attempted to explain to pt need for midline and no need to be sedated that it is an IV poke and she continued to refuse. I was able to say can I get a regular IV placed and she finally agreed. I did place a PowerGlide Midline RFA 20g/8cm without difficulty, good blood return. During placement pt started yelling she had to \"Poop\" and I had to tell her not to move. After completion RN came right and took pt to bathroom. There would have been no time for full draping of trimmable midline and I believe pt would have not tolerated the drape and she started yelling when I initially said the gentleman in room would have to leave. At this time I used my judgement to place the PowerGlide Midline instead. MDs and RN notified. PowerGlide Forearm Catheter Midline Procedure Note:  Chart reviewed for allergies, diagnosis, labs, known contraindications, reason for line placement and planned length of treatment. Insertion procedure discussed with patient/family/staff member. Informed consent not required for Midline placement. Three patient identifiers - Patient name,   and MRN -  completed &  confirmed verbally. Hat, mask and eye shield donned. Site scrubbed with Chloraprep  One-Step applicator  for 30 seconds x 1. Hand Hygiene  performed with 3% Chlorhexidine surgical scrub x1 min prior to  Sterile gloves. Patient arm draped. Site scrubbed a 2nd time with Chloraprep One-Step applicator x 30 sec. Vein located by Ultra Sound. PowerGlide inserted. Positive brisk blood return obtained and flushed with 10 mls  0.9% Sterile Sodium Chloride. Catheter flushes easily with no resistance. Valve placed and followed by Alcohol Swab Cap on end. Skin prep applied to site. Catheter secured with non-sutured locking device per hospital protocol. Bio-patch/CHG sterile dressing applied. Sterile field maintained during procedure. Guide wire accounted for post procedure. Pt. Response to procedure, tolerated well. Appearance of site: Clean dry and intact without bleeding or edema. All edges of Tegaderm occlusive. Site marked with date and initials of RN placing line. Top 2 side rails in up position, call button in reach, RN notified of all of the above.

## 2023-02-09 NOTE — PLAN OF CARE
Problem: Discharge Planning  Goal: Discharge to home or other facility with appropriate resources  2/8/2023 2259 by Leonel Payment, RN  Outcome: Progressing  Flowsheets (Taken 2/8/2023 2100)  Discharge to home or other facility with appropriate resources: Identify barriers to discharge with patient and caregiver     Problem: Safety - Adult  Goal: Free from fall injury  2/8/2023 2259 by Leonel Payment, RN  Outcome: Progressing     Problem: ABCDS Injury Assessment  Goal: Absence of physical injury  2/8/2023 2259 by Leonel Payment, RN  Outcome: Progressing     Problem: Pain  Goal: Verbalizes/displays adequate comfort level or baseline comfort level  2/8/2023 2259 by Leonel Payment, RN  Outcome: Progressing

## 2023-02-09 NOTE — PROGRESS NOTES
No change in assessment. States she feels \"terrible\" and wants to sleep. Lactulose given PO. Lab called again about morning labs - states they will notify the phlebotomist to draw ordered labs.

## 2023-02-09 NOTE — PROGRESS NOTES
4 Eyes Admission Assessment     I agree as the admission nurse that 2 RN's have performed a thorough Head to Toe Skin Assessment on the patient. ALL assessment sites listed below have been assessed on admission. Areas assessed by both nurses: Tracy/Hope  [x]   Head, Face, and Ears   [x]   Shoulders, Back, and Chest  [x]   Arms, Elbows, and Hands   [x]   Coccyx, Sacrum, and Ischium  [x]   Legs, Feet, and Heels        Does the Patient have Skin Breakdown?   No         Kevin Prevention initiated:  Yes   Wound Care Orders initiated:  No      [facility] nurse consulted for Pressure Injury (Stage 3,4, Unstageable, DTI, NWPT, and Complex wounds) or Kevin score 18 or lower:  No      Nurse 1 eSignature: Electronically signed by Chayito Eubanks RN on 2/8/23 at 9:20 PM EST    **SHARE this note so that the co-signing nurse is able to place an eSignature**    Nurse 2 eSignature: Electronically signed by Moustapha Warren RN on 2/8/23 at 9:53 PM EST

## 2023-02-10 LAB
ALBUMIN SERPL-MCNC: 1.9 G/DL (ref 3.4–5)
ALBUMIN SERPL-MCNC: 2.4 G/DL (ref 3.4–5)
ALBUMIN SERPL-MCNC: 2.5 G/DL (ref 3.4–5)
ALP BLD-CCNC: 93 U/L (ref 40–129)
ALT SERPL-CCNC: 618 U/L (ref 10–40)
ANION GAP SERPL CALCULATED.3IONS-SCNC: 7 MMOL/L (ref 3–16)
ANION GAP SERPL CALCULATED.3IONS-SCNC: 9 MMOL/L (ref 3–16)
AST SERPL-CCNC: 416 U/L (ref 15–37)
BILIRUB SERPL-MCNC: 0.4 MG/DL (ref 0–1)
BILIRUBIN DIRECT: <0.2 MG/DL (ref 0–0.3)
BILIRUBIN, INDIRECT: ABNORMAL MG/DL (ref 0–1)
BUN BLDV-MCNC: 10 MG/DL (ref 7–20)
BUN BLDV-MCNC: 16 MG/DL (ref 7–20)
CALCIUM SERPL-MCNC: 6.4 MG/DL (ref 8.3–10.6)
CALCIUM SERPL-MCNC: 7.3 MG/DL (ref 8.3–10.6)
CHLORIDE BLD-SCNC: 101 MMOL/L (ref 99–110)
CHLORIDE BLD-SCNC: 102 MMOL/L (ref 99–110)
CO2: 26 MMOL/L (ref 21–32)
CO2: 28 MMOL/L (ref 21–32)
CREAT SERPL-MCNC: 0.6 MG/DL (ref 0.6–1.1)
CREAT SERPL-MCNC: 0.6 MG/DL (ref 0.6–1.1)
GFR SERPL CREATININE-BSD FRML MDRD: >60 ML/MIN/{1.73_M2}
GFR SERPL CREATININE-BSD FRML MDRD: >60 ML/MIN/{1.73_M2}
GLUCOSE BLD-MCNC: 115 MG/DL (ref 70–99)
GLUCOSE BLD-MCNC: 136 MG/DL (ref 70–99)
GLUCOSE BLD-MCNC: 93 MG/DL (ref 70–99)
HAV AB SERPL IA-ACNC: NEGATIVE
HEPATITIS B CORE TOTAL ANTIBODY: POSITIVE
HEPATITIS BE ANTIGEN: POSITIVE
INR BLD: 1.64 (ref 0.87–1.14)
INR BLD: 1.87 (ref 0.87–1.14)
INR BLD: 1.99 (ref 0.87–1.14)
MAGNESIUM: 1.5 MG/DL (ref 1.8–2.4)
MAGNESIUM: 4.9 MG/DL (ref 1.8–2.4)
PERFORMED ON: ABNORMAL
PHOSPHORUS: 1.6 MG/DL (ref 2.5–4.9)
PHOSPHORUS: 2.1 MG/DL (ref 2.5–4.9)
POTASSIUM SERPL-SCNC: 2.6 MMOL/L (ref 3.5–5.1)
POTASSIUM SERPL-SCNC: 3.1 MMOL/L (ref 3.5–5.1)
PROTHROMBIN TIME: 19.4 SEC (ref 11.7–14.5)
PROTHROMBIN TIME: 21.5 SEC (ref 11.7–14.5)
PROTHROMBIN TIME: 22.6 SEC (ref 11.7–14.5)
SODIUM BLD-SCNC: 136 MMOL/L (ref 136–145)
SODIUM BLD-SCNC: 137 MMOL/L (ref 136–145)
TOTAL PROTEIN: 3.6 G/DL (ref 6.4–8.2)

## 2023-02-10 PROCEDURE — 83735 ASSAY OF MAGNESIUM: CPT

## 2023-02-10 PROCEDURE — 6370000000 HC RX 637 (ALT 250 FOR IP): Performed by: INTERNAL MEDICINE

## 2023-02-10 PROCEDURE — 80076 HEPATIC FUNCTION PANEL: CPT

## 2023-02-10 PROCEDURE — 6370000000 HC RX 637 (ALT 250 FOR IP): Performed by: STUDENT IN AN ORGANIZED HEALTH CARE EDUCATION/TRAINING PROGRAM

## 2023-02-10 PROCEDURE — 36592 COLLECT BLOOD FROM PICC: CPT

## 2023-02-10 PROCEDURE — 6360000002 HC RX W HCPCS: Performed by: STUDENT IN AN ORGANIZED HEALTH CARE EDUCATION/TRAINING PROGRAM

## 2023-02-10 PROCEDURE — 36415 COLL VENOUS BLD VENIPUNCTURE: CPT

## 2023-02-10 PROCEDURE — 2580000003 HC RX 258: Performed by: STUDENT IN AN ORGANIZED HEALTH CARE EDUCATION/TRAINING PROGRAM

## 2023-02-10 PROCEDURE — 99232 SBSQ HOSP IP/OBS MODERATE 35: CPT | Performed by: INTERNAL MEDICINE

## 2023-02-10 PROCEDURE — 2060000000 HC ICU INTERMEDIATE R&B

## 2023-02-10 PROCEDURE — 6370000000 HC RX 637 (ALT 250 FOR IP)

## 2023-02-10 PROCEDURE — 2580000003 HC RX 258: Performed by: INTERNAL MEDICINE

## 2023-02-10 PROCEDURE — 85610 PROTHROMBIN TIME: CPT

## 2023-02-10 PROCEDURE — 6360000002 HC RX W HCPCS: Performed by: INTERNAL MEDICINE

## 2023-02-10 PROCEDURE — 80069 RENAL FUNCTION PANEL: CPT

## 2023-02-10 RX ORDER — ENOXAPARIN SODIUM 100 MG/ML
40 INJECTION SUBCUTANEOUS DAILY
Status: DISCONTINUED | OUTPATIENT
Start: 2023-02-10 | End: 2023-02-17 | Stop reason: HOSPADM

## 2023-02-10 RX ORDER — MECOBALAMIN 5000 MCG
10 TABLET,DISINTEGRATING ORAL NIGHTLY
Status: DISCONTINUED | OUTPATIENT
Start: 2023-02-10 | End: 2023-02-17 | Stop reason: HOSPADM

## 2023-02-10 RX ORDER — ACETAMINOPHEN 325 MG/1
650 TABLET ORAL ONCE
Status: DISCONTINUED | OUTPATIENT
Start: 2023-02-10 | End: 2023-02-17 | Stop reason: HOSPADM

## 2023-02-10 RX ORDER — MAGNESIUM SULFATE IN WATER 40 MG/ML
4000 INJECTION, SOLUTION INTRAVENOUS ONCE
Status: DISCONTINUED | OUTPATIENT
Start: 2023-02-10 | End: 2023-02-17 | Stop reason: HOSPADM

## 2023-02-10 RX ORDER — MAGNESIUM SULFATE IN WATER 40 MG/ML
2000 INJECTION, SOLUTION INTRAVENOUS ONCE
Status: COMPLETED | OUTPATIENT
Start: 2023-02-10 | End: 2023-02-10

## 2023-02-10 RX ORDER — POTASSIUM CHLORIDE 20 MEQ/1
40 TABLET, EXTENDED RELEASE ORAL ONCE
Status: COMPLETED | OUTPATIENT
Start: 2023-02-10 | End: 2023-02-10

## 2023-02-10 RX ADMIN — Medication 10 MG: at 22:24

## 2023-02-10 RX ADMIN — DIBASIC SODIUM PHOSPHATE, MONOBASIC POTASSIUM PHOSPHATE AND MONOBASIC SODIUM PHOSPHATE 1 TABLET: 852; 155; 130 TABLET ORAL at 14:16

## 2023-02-10 RX ADMIN — LACTULOSE 20 G: 20 SOLUTION ORAL at 21:13

## 2023-02-10 RX ADMIN — SODIUM CHLORIDE, PRESERVATIVE FREE 10 ML: 5 INJECTION INTRAVENOUS at 21:14

## 2023-02-10 RX ADMIN — MAGNESIUM SULFATE HEPTAHYDRATE 2000 MG: 40 INJECTION, SOLUTION INTRAVENOUS at 05:30

## 2023-02-10 RX ADMIN — DIBASIC SODIUM PHOSPHATE, MONOBASIC POTASSIUM PHOSPHATE AND MONOBASIC SODIUM PHOSPHATE 1 TABLET: 852; 155; 130 TABLET ORAL at 11:26

## 2023-02-10 RX ADMIN — FUROSEMIDE 5 MG/HR: 10 INJECTION INTRAMUSCULAR; INTRAVENOUS at 04:20

## 2023-02-10 RX ADMIN — LACTULOSE 20 G: 20 SOLUTION ORAL at 10:06

## 2023-02-10 RX ADMIN — MAGNESIUM SULFATE HEPTAHYDRATE 2000 MG: 40 INJECTION, SOLUTION INTRAVENOUS at 12:08

## 2023-02-10 RX ADMIN — POTASSIUM CHLORIDE 40 MEQ: 1500 TABLET, EXTENDED RELEASE ORAL at 10:06

## 2023-02-10 RX ADMIN — LACTULOSE 20 G: 20 SOLUTION ORAL at 14:16

## 2023-02-10 RX ADMIN — DIBASIC SODIUM PHOSPHATE, MONOBASIC POTASSIUM PHOSPHATE AND MONOBASIC SODIUM PHOSPHATE 1 TABLET: 852; 155; 130 TABLET ORAL at 21:13

## 2023-02-10 ASSESSMENT — PAIN SCALES - WONG BAKER
WONGBAKER_NUMERICALRESPONSE: 0

## 2023-02-10 ASSESSMENT — PAIN SCALES - GENERAL
PAINLEVEL_OUTOF10: 0
PAINLEVEL_OUTOF10: 0

## 2023-02-10 ASSESSMENT — ENCOUNTER SYMPTOMS
ABDOMINAL PAIN: 1
DIARRHEA: 0
NAUSEA: 0
COUGH: 0
CONSTIPATION: 0
VOMITING: 0
SHORTNESS OF BREATH: 1

## 2023-02-10 NOTE — PROGRESS NOTES
HOSPITALISTS PROGRESS NOTE    2/10/2023 4:37 PM        Name: Juanpablo Rivera . Admitted: 2/3/2023  Primary Care Provider: No primary care provider on file. (Tel: None)      Problem List  Principal Problem:    Acute pyelonephritis  Active Problems:    Pyelonephritis    Acute systolic congestive heart failure (HCC)    Primary hypertension    Acute hepatitis B    Bipolar affective disorder, current episode manic ([address]  Resolved Problems:    * No resolved hospital problems. *       Assessment & Plan:   Biventricular failure acute  Cardiology following, etiology unclear possibly stress-induced/polysubstance abuse    Elevated LFTs probably related to passive liver congestion secondary to right-sided heart failure, Hep BsAg positive, GI following     H/o BIpolar disorder     Rt sided pleural effusion       IV Access: peripheral   [place]:  Diet: ADULT DIET; Regular; 3 carb choices (45 gm/meal); Low Fat/Low Chol/High Fiber/2 gm Na; Low Sodium (2 gm); 1200 ml  Code:Full Code  DVT PPX Lovenox  Disposition out of icu      Brief Course:  HPI:   Ms. Juanpablo Rivera is a 62-year-old  female with medical history significant for bipolar affective disorder, hepatitis C infection in 02/2020 treated with ledipasvir-sofosbuvir, essential hypertension, asthma, and h/o crystal meth use who presented to the ED with a one-week history of chills, sinus congestion, cough, dyspnea, abdominal pain, and low back and was admitted on 0/35/4875 for acute complicated cystitis with bilateral pleural effusions for which ceftriaxone and IVF were initiated. Methamphetamine was found on UDS. Patient reports that she hasn't used crystal meth within the last year; however, reports recent use of Adderall for ADHD.        Patient had been refusing lab draws since 2/6; however, today, she was found to be increasingly lethargic and continued to have back pain with nausea. Patient finally allowed for lab draw which demonstrated acute liver injury. She was transferred to the ICU on 2/08/2023 for concerns of impending acute respiratory failure in the setting of lactic acidosis secondary to acute liver injury. This morning pt groaning in repsonse to any questions and initially refusing blood draws again, but amenable after discussion with overnight team. Discussed plan following initial rounds for lasix gtt, but that she would need to consent to either q12 blood draws or PICC line placement. Pt agreeable to PICC. CC:  abd pain    Subjective:  .   Patient is lying in bed patient denying any complaint, does not talk much      Reviewed interval ancillary notes    Current Medications  magnesium sulfate 4000 mg in 100 mL IVPB premix, Once  phosphorus (K PHOS NEUTRAL) tablet 1 tablet, TID  lactulose enema, Once  furosemide (LASIX) 100 mg in sodium chloride 0.9 % 100 mL infusion, Continuous  lactulose (CHRONULAC) 10 GM/15ML solution 20 g, TID  lidocaine PF 1 % injection 5 mL, Once  sodium chloride flush 0.9 % injection 5-40 mL, 2 times per day  sodium chloride flush 0.9 % injection 5-40 mL, PRN  0.9 % sodium chloride infusion, PRN  [Held by provider] lurasidone (LATUDA) tablet 20 mg, Daily  docusate sodium (COLACE) capsule 100 mg, BID PRN  morphine (PF) injection 2 mg, Q2H PRN  perflutren lipid microspheres (DEFINITY) injection 1.5 mL, ONCE PRN  LORazepam (ATIVAN) injection 1 mg, Q6H PRN  hydrOXYzine HCl (ATARAX) tablet 10 mg, TID PRN  polyethylene glycol (GLYCOLAX) packet 17 g, Daily  sodium chloride flush 0.9 % injection 10 mL, 2 times per day  sodium chloride flush 0.9 % injection 10 mL, PRN  0.9 % sodium chloride infusion, PRN  potassium chloride 10 mEq/100 mL IVPB (Peripheral Line), PRN  magnesium sulfate 2000 mg in 50 mL IVPB premix, PRN  promethazine (PHENERGAN) tablet 12.5 mg, Q6H PRN   Or  ondansetron (ZOFRAN) injection 4 mg, Q6H PRN  nicotine (NICODERM CQ) 21 MG/24HR 1 patch, Daily        Objective:  BP (!) 135/101   Pulse (!) 106   Temp 99 °F (37.2 °C) (Oral)   Resp (!) 35   Ht 5' 3\" (1.6 m)   Wt 173 lb 15.1 oz (78.9 kg)   SpO2 97%   BMI 30.81 kg/m²     Intake/Output Summary (Last 24 hours) at 2/10/2023 1637  Last data filed at 2/10/2023 1612  Gross per 24 hour   Intake 1003 ml   Output 5600 ml   Net -4597 ml      Wt Readings from Last 3 Encounters:   02/10/23 173 lb 15.1 oz (78.9 kg)   08/10/21 144 lb 4 oz (65.4 kg)   08/18/17 155 lb (70.3 kg)   No pedal edema  No abdominal pain  Lungs diminished   Oral mucosa is moist     Labs and Tests:  CBC:   Recent Labs     02/08/23  1517 02/09/23  1028   WBC 13.9* 15.0*   HGB 11.3* 11.9*    406     BMP:    Recent Labs     02/09/23  1028 02/09/23  1405 02/10/23  0434   * 135* 136   K 3.8 3.7 3.1*    107 101   CO2 17* 16* 26   BUN 17 17 16   CREATININE 0.6 0.5* 0.6   GLUCOSE 121* 133* 136*     Hepatic:   Recent Labs     02/08/23  1517 02/09/23  1028 02/10/23  1107   AST 1,254* 989* 416*   ALT 1,101* 1,043* 618*   BILITOT 0.7 0.8 0.4   ALKPHOS 120 119 93     CT CHEST ABDOMEN PELVIS W CONTRAST Additional Contrast? None   Final Result      1. There is edema identified within the fat planes of the neck as well as within the subcutaneous fat about the thorax. There is also edema within the superior mediastinum. Correlate for anasarca-third spacing. 2. Large layering right-sided pleural effusion is increased from the prior study. There is a small layering left pleural effusion which is mildly increased from the prior study. 3. There is some compressive atelectasis within dependent portion of right lower lobe. Linear atelectasis is present with the right middle lobe and lingula. 4. There are multiple venous collaterals identified about the left shoulder girdle. This is nonspecific. The SVC, left innominate vein, left subclavian vein and left axillary vein are patent.     5. There is some enlargement of the left ventricle, right ventricle and right atrium. Correlate for cardiomyopathy. This could be better evaluated with echocardiogram.      ABDOMEN AND PELVIS: There is subcutaneous edema identified about the abdomen and pelvis. Note should be made that the inferior pelvis was not included within the field-of-view. There is a small amount of free fluid identified with the right paracolic gutter, Morison's pouch as well as the pelvic cul-de-sac. Liver attenuation is decreased. There is some gallbladder wall edema. Pancreas, spleen and adrenal glands are normal. Renal size and enhancement is normal.      No bowel dilatation or bowel wall thickening is identified. Note should be made that some small bowel loops as well as the cecum were not entirely included within the field-of-view due to incomplete imaging through the pelvis. No adnexal mass is identified. No lymphadenopathy is identified. IMPRESSION:      1. Subcutaneous edema is identified about the abdomen and pelvis. This can be seen with anasarca-third spacing. 2. There is gallbladder wall edema. This is nonspecific and may be related to volume overload. Cholecystitis cannot entirely be excluded. 3. Liver attenuation is decreased. This may reflect hepatic steatosis or hepatitis. Correlate with liver function tests. 4. Small amount of free fluid is identified within the abdomen and pelvis. CT HEAD WO CONTRAST   Final Result   1. No acute intracranial process. CT ABDOMEN PELVIS WO CONTRAST Additional Contrast? None   Final Result      1. Small to moderate right pleural effusion and small left pleural effusion with subsegmental atelectasis. No visible pneumonia. 2. Mild mesenteric and retroperitoneal edema, trace ascites and periportal edema. Hepatocellular disease is a possibility. Correlate with liver function tests. XR CHEST (2 VW)   Final Result      No evidence for acute cardiopulmonary disease.                     Jairon GRAHAM Hayder Salazar MD   2/10/2023 4:37 PM

## 2023-02-10 NOTE — PROGRESS NOTES
Comprehensive Nutrition Assessment    RECOMMENDATIONS:  PO Diet: Continue Regular; 3 carb choice; low fat/low chol/high fiber/2g Na; 1200 mL FR diet  ONS: D/t fluid restriction will leave off for now and continue to assess need  Nutrition Education: Education not appropriate     NUTRITION ASSESSMENT:   Nutritional summary & status: LOS: Pt on Regular; 3 carb choice; low fat/low chol/high fiber/2g Na; 1200 mL FR diet. Intakes on average >50%, have been improving over past two days per RN. Pt lytes low d/t lasix drip, being repleted aggresively, no concern for refeeding syndrome. Pt w/ initial false IP w/ report of wt loss. Per EMR pt has gained ~30#. Likely poor intakes prior to admission d/t socio/economic status and not d/t poor appetite. Currently +4.9L, down from admission, voiding well. Originally discussed adding ONS for pt for when intakes are poor w/ RN, however d/t fluid restriction will leave off for now. Will continue to monitor. Admission/PMH: Acute pyelonephritis, CHF, HTN, Hep B    MALNUTRITION ASSESSMENT  Context of Malnutrition: Acute Illness   Malnutrition Status:  At risk for malnutrition (Comment)  Findings of the 6 clinical characteristics of malnutrition (Minimum of 2 out of 6 clinical characteristics is required to make the diagnosis of moderate or severe Protein Calorie Malnutrition based on AND/ASPEN Guidelines):  Energy Intake:  Mild decrease in energy intake (Comment)  Weight Loss:  No significant weight loss     Body Fat Loss:  No significant body fat loss     Muscle Mass Loss:  No significant muscle mass loss    Fluid Accumulation:  Mild      NUTRITION DIAGNOSIS   In context of social or environmental circumstances related to lack or limited access to food as evidenced by poor intake prior to admission    Nutrition Monitoring and Evaluation:   Food/Nutrient Intake Outcomes:  Food and Nutrient Intake  Physical Signs/Symptoms Outcomes:  Biochemical Data, Nutrition Focused Physical Findings, Weight     OBJECTIVE DATA: Significant to nutrition assessment  Nutrition Related Findings: K 3.1. Mg 1.5. . Phos 2.1. Active bowel sounds. +BM 2/10. +4.9L. RLE/LLE non-pitting edema. Wounds: None  Nutrition Goals: PO intake 75% or greater, prior to discharge     [facility] DIET; Regular; 3 carb choices (45 gm/meal); Low Fat/Low Chol/High Fiber/2 gm Na; Low Sodium (2 gm); 1200 ml  PO Intake: 51-75%, %, 26-50%   PO Supplement Intake:None Ordered  Additional Sources of Calories/IVF:N/A     ANTHROPOMETRICS  Current Height: 5' 3\" (160 cm)  Current Weight: 173 lb 15.1 oz (78.9 kg)    Admission weight: 130 lb (59 kg)  Ideal Body Weight (IBW): 115 lbs  (52 kg)    BMI: 30.9    COMPARATIVE STANDARDS  Energy (kcal):  8990-8461 (15-20 kcal/kg CBW)     Protein (g):  63-79 (0.8-1.0 g/kg CBW)       Fluid (mL/day):  1 mL/kcal or per MD    The patient will be monitored per nutrition standards of care. Consult dietitian if additional nutrition interventions are needed prior to RD reassessment.      Henny Peña, [address], [address] [place] [address] Drive:  [phone]  Office:  [phone]

## 2023-02-10 NOTE — PROGRESS NOTES
Patient complaining of not being able to sleep and having anxiety. PRN medication given at this time. Bed is in lowest position with wheels locked and bed alarm active. Call light is within reach.

## 2023-02-10 NOTE — PROGRESS NOTES
[facility] Daily Progress Note      Admit Date:  2/3/2023    Subjective:  Ms. Srinath Gipson was seen at bedside today. Complaining of non specific pain.  No acute complaints    Objective:   BP (!) 135/101   Pulse (!) 106   Temp 99 °F (37.2 °C) (Oral)   Resp (!) 35   Ht 5' 3\" (1.6 m)   Wt 173 lb 15.1 oz (78.9 kg)   SpO2 97%   BMI 30.81 kg/m²     Intake/Output Summary (Last 24 hours) at 2/10/2023 1055  Last data filed at 2/10/2023 0820  Gross per 24 hour   Intake 1243 ml   Output 4200 ml   Net -2957 ml     TELEMETRY: Sinus tach    Physical Exam:  General:  Awake, alert, NAD  Skin:  Warm and dry  Neck:  JVP not appreacited  Chest:  Clear to auscultation, respiration normal  Cardiovascular:  RRR S1S2  Abdomen:  Soft non tender  Extremities:  minimal edema    Medications:    magnesium sulfate  4,000 mg IntraVENous Once    phosphorus  250 mg Oral TID    lactulose enema   Rectal Once    lactulose  20 g Oral TID    lidocaine 1 % injection  5 mL IntraDERmal Once    sodium chloride flush  5-40 mL IntraVENous 2 times per day    [Held by provider] lurasidone  20 mg Oral Daily    polyethylene glycol  17 g Oral Daily    sodium chloride flush  10 mL IntraVENous 2 times per day    nicotine  1 patch TransDERmal Daily      furosemide (LASIX) 1mg/mL infusion 5 mg/hr (02/10/23 0420)    sodium chloride      sodium chloride 5 mL/hr at 02/08/23 2215     sodium chloride flush, sodium chloride, docusate sodium, morphine, perflutren lipid microspheres, LORazepam, hydrOXYzine HCl, sodium chloride flush, sodium chloride, potassium chloride, magnesium sulfate, promethazine **OR** ondansetron    Lab Data:  CBC:   Recent Labs     02/08/23  1517 02/09/23  1028   WBC 13.9* 15.0*   HGB 11.3* 11.9*   HCT 37.0 38.6   MCV 84.1 84.9    406     BMP:   Recent Labs     02/09/23  1028 02/09/23  1405 02/10/23  0434   * 135* 136   K 3.8 3.7 3.1*    107 101   CO2 17* 16* 26   PHOS  --  2.3* 2.1*   BUN 17 17 16   CREATININE 0.6 0.5* 0.6     LIVER PROFILE:   Recent Labs     02/08/23  1514 02/08/23  1517 02/09/23  1028   AST  --  1,254* 989*   ALT  --  1,101* 1,043*   LIPASE 32.0  --   --    BILITOT  --  0.7 0.8   ALKPHOS  --  120 119     PT/INR:   Recent Labs     02/09/23  1405 02/09/23  2017 02/10/23  0434   PROTIME 28.9* 23.9* 21.5*   INR 2.71* 2.13* 1.87*     APTT: No results for input(s): APTT in the last 72 hours. BNP:  No results for input(s): BNP in the last 72 hours. IMAGING:   TTE on 02/03/23   Summary   Dilated left ventricle cavity size. Normal left ventricle wall thickness. Ejection fraction is visually estimated to be 10-15 %. Severe global   hypokinesis of the left ventricle. Grade II diastolic dysfunction with elevated LV filling pressures. Normal right ventricle size. Mildly Reduced right ventricle systolic   function. Moderate mitral regurgitation. Moderate tricuspid regurgitation. IVC size is dilated (>2.1 cm) and collapses < 50% with respiration   consistent with elevated RA pressure (15 mmHg). Estimated pulmonary artery systolic pressure is elevated at 48 mmHg assuming   a right atrial pressure of 15 mmHg.     Assessment:  Patient Active Problem List    Diagnosis Date Noted    Acute systolic congestive heart failure ([address] 02/09/2023    Primary hypertension 02/09/2023    Acute hepatitis B 02/09/2023    Bipolar affective disorder, current episode manic ([address] 02/09/2023    Pyelonephritis 02/07/2023    Acute pyelonephritis 02/04/2023    Normal pregnancy in multigravida in third trimester 08/18/2017     Plan:  Acute/new onset dilated CM  HFrEF  Moderate pericardial effusion  Gross anasarca  - Continue diuresis with lasix gtt @ 5  - Bp better with diuresis  - Will consider ischemic workup once euvolemic    Acute liver failure  GI following    Sinus tachycardia   Possibly from withdrawal?   Somewhat improving with diuresis     Will discuss with Dr Paloma Johnson MD 2/10/2023 10:55 AM

## 2023-02-10 NOTE — PROGRESS NOTES
ICU Progress Note    Admit Date: 2/3/2023  IV Access: PIV  IV Fluids: None  Vasopressors: None                Antibiotics: None  Diet: ADULT DIET; Regular; 3 carb choices (45 gm/meal); Low Fat/Low Chol/High Fiber/2 gm Na; Low Sodium (2 gm); 1200 ml    CC: Abdominal/back pain    Interval history:   Stable, pain controlled overnight. Tolerating PO well. Reports minimal sleep overnight 2/2 increased urination. Good UOP w/ lasix gtt w/ 3700mL. INR downtrending, LFTs pending. HPI:   Ms. Juanpablo Rivera is a 65-year-old  female with medical history significant for bipolar affective disorder, hepatitis C infection in 02/2020 treated with ledipasvir-sofosbuvir, essential hypertension, asthma, and h/o crystal meth use who presented to the ED with a one-week history of chills, sinus congestion, cough, dyspnea, abdominal pain, and low back and was admitted on 2/77/3206 for acute complicated cystitis with bilateral pleural effusions for which ceftriaxone and IVF were initiated. Methamphetamine was found on UDS. Patient reports that she hasn't used crystal meth within the last year; however, reports recent use of Adderall for ADHD. Patient had been refusing lab draws since 2/6; however, today, she was found to be increasingly lethargic and continued to have back pain with nausea. Patient finally allowed for lab draw which demonstrated acute liver injury. She was transferred to the ICU on 2/08/2023 for concerns of impending acute respiratory failure in the setting of lactic acidosis secondary to acute liver injury. This morning pt groaning in repsonse to any questions and initially refusing blood draws again, but amenable after discussion with overnight team. Discussed plan following initial rounds for lasix gtt, but that she would need to consent to either q12 blood draws or PICC line placement. Pt agreeable to PICC.      Medications:     Scheduled Meds:   lactulose enema   Rectal Once    lactulose  20 g Oral TID    lidocaine 1 % injection  5 mL IntraDERmal Once    sodium chloride flush  5-40 mL IntraVENous 2 times per day    [Held by provider] lurasidone  20 mg Oral Daily    polyethylene glycol  17 g Oral Daily    sodium chloride flush  10 mL IntraVENous 2 times per day    nicotine  1 patch TransDERmal Daily     Continuous Infusions:   furosemide (LASIX) 1mg/mL infusion 5 mg/hr (02/10/23 0420)    sodium chloride      sodium chloride 5 mL/hr at 02/08/23 2215     PRN Meds:sodium chloride flush, sodium chloride, docusate sodium, morphine, perflutren lipid microspheres, LORazepam, hydrOXYzine HCl, sodium chloride flush, sodium chloride, potassium chloride, magnesium sulfate, promethazine **OR** ondansetron    Objective:   Vitals:   T-max:  Patient Vitals for the past 8 hrs:   BP Temp Temp src Pulse Resp SpO2 Weight   02/10/23 0700 (!) 135/101 -- -- (!) 106 (!) 35 -- --   02/10/23 0600 (!) 141/106 -- -- (!) 105 (!) 36 -- --   02/10/23 0541 -- -- -- -- -- -- 173 lb 15.1 oz (78.9 kg)   02/10/23 0500 (!) 146/106 -- -- (!) 109 (!) 31 -- --   02/10/23 0400 (!) 132/110 99 °F (37.2 °C) Oral (!) 101 (!) 33 97 % --   02/10/23 0300 (!) 138/118 -- -- (!) 106 (!) 35 -- --   02/10/23 0200 (!) 134/109 -- -- (!) 101 (!) 33 -- --   02/10/23 0100 (!) 125/95 -- -- (!) 102 26 -- --   02/10/23 0000 (!) 142/114 99.4 °F (37.4 °C) Oral (!) 105 (!) 34 98 % --       Intake/Output Summary (Last 24 hours) at 2/10/2023 0755  Last data filed at 2/10/2023 1493  Gross per 24 hour   Intake 1483 ml   Output 3700 ml   Net -2217 ml       Review of Systems   Constitutional:  Negative for appetite change, chills, diaphoresis and fever. Respiratory:  Positive for shortness of breath. Negative for cough. Cardiovascular:  Negative for chest pain and leg swelling. Gastrointestinal:  Positive for abdominal pain. Negative for constipation, diarrhea, nausea and vomiting. Genitourinary:  Negative for dysuria, flank pain, frequency and hematuria. Neurological:  Positive for headaches. Negative for dizziness and light-headedness. All other systems reviewed and are negative. Physical Exam  Vitals and nursing note reviewed. Constitutional:       General: She is not in acute distress. Appearance: Normal appearance. She is obese. She is not ill-appearing or diaphoretic. HENT:      Head: Normocephalic. Nose: Nose normal.      Mouth/Throat:      Mouth: Mucous membranes are moist.      Pharynx: Oropharynx is clear. Cardiovascular:      Rate and Rhythm: Regular rhythm. Tachycardia present. Pulses: Normal pulses. Heart sounds: Normal heart sounds. No murmur heard. No friction rub. No gallop. Pulmonary:      Breath sounds: Normal breath sounds. No stridor. No wheezing, rhonchi or rales. Comments: Tachypneic, satting 100% on RA. Abdominal:      General: Abdomen is flat. There is no distension. Palpations: Abdomen is soft. Tenderness: There is no guarding or rebound. Comments: Minimal diffuse tenderness   Musculoskeletal:      Right lower leg: Edema present. Left lower leg: Edema present. Skin:     General: Skin is warm and dry. Coloration: Skin is not pale. Neurological:      Mental Status: She is alert and oriented to person, place, and time.          LABS:    CBC:   Recent Labs     02/08/23  1517 02/09/23  1028   WBC 13.9* 15.0*   HGB 11.3* 11.9*   HCT 37.0 38.6    406   MCV 84.1 84.9     Renal:    Recent Labs     02/09/23  1028 02/09/23  1405 02/10/23  0434   * 135* 136   K 3.8 3.7 3.1*    107 101   CO2 17* 16* 26   BUN 17 17 16   CREATININE 0.6 0.5* 0.6   GLUCOSE 121* 133* 136*   CALCIUM 7.9* 6.7* 7.3*   MG 1.70* 1.40* 1.50*   PHOS  --  2.3* 2.1*   ANIONGAP 14 12 9     Hepatic:   Recent Labs     02/08/23  1517 02/09/23  1028 02/09/23  1405 02/10/23  0434   AST 1,254* 989*  --   --    ALT 1,101* 1,043*  --   --    BILITOT 0.7 0.8  --   --    PROT 5.1* 4.8*  --   --    LABALBU 3. 0* 2.6* 2.2* 2.5*   ALKPHOS 120 119  --   --      Troponin: No results for input(s): TROPONINI in the last 72 hours. BNP: No results for input(s): BNP in the last 72 hours. Lipids: No results for input(s): CHOL, HDL in the last 72 hours. Invalid input(s): LDLCALCU, TRIGLYCERIDE  ABGs:  No results for input(s): PHART, ANL3IIA, PO2ART, JBO5OBW, BEART, THGBART, E6MRXSLE, CQC5SKB in the last 72 hours. INR:   Recent Labs     02/09/23  1405 02/09/23  2017 02/10/23  0434   INR 2.71* 2.13* 1.87*     Lactate: No results for input(s): LACTATE in the last 72 hours. Cultures:  -----------------------------------------------------------------  RAD:   CT CHEST ABDOMEN PELVIS W CONTRAST Additional Contrast? None   Final Result      1. There is edema identified within the fat planes of the neck as well as within the subcutaneous fat about the thorax. There is also edema within the superior mediastinum. Correlate for anasarca-third spacing. 2. Large layering right-sided pleural effusion is increased from the prior study. There is a small layering left pleural effusion which is mildly increased from the prior study. 3. There is some compressive atelectasis within dependent portion of right lower lobe. Linear atelectasis is present with the right middle lobe and lingula. 4. There are multiple venous collaterals identified about the left shoulder girdle. This is nonspecific. The SVC, left innominate vein, left subclavian vein and left axillary vein are patent. 5. There is some enlargement of the left ventricle, right ventricle and right atrium. Correlate for cardiomyopathy. This could be better evaluated with echocardiogram.      ABDOMEN AND PELVIS: There is subcutaneous edema identified about the abdomen and pelvis. Note should be made that the inferior pelvis was not included within the field-of-view.       There is a small amount of free fluid identified with the right paracolic gutter, Morison's pouch as well as the pelvic cul-de-sac.      Liver attenuation is decreased. There is some gallbladder wall edema. Pancreas, spleen and adrenal glands are normal. Renal size and enhancement is normal.      No bowel dilatation or bowel wall thickening is identified. Note should be made that some small bowel loops as well as the cecum were not entirely included within the field-of-view due to incomplete imaging through the pelvis.      No adnexal mass is identified. No lymphadenopathy is identified.      IMPRESSION:      1. Subcutaneous edema is identified about the abdomen and pelvis. This can be seen with anasarca-third spacing.   2. There is gallbladder wall edema. This is nonspecific and may be related to volume overload. Cholecystitis cannot entirely be excluded.   3. Liver attenuation is decreased. This may reflect hepatic steatosis or hepatitis. Correlate with liver function tests.   4. Small amount of free fluid is identified within the abdomen and pelvis.      CT HEAD WO CONTRAST   Final Result   1. No acute intracranial process.      CT ABDOMEN PELVIS WO CONTRAST Additional Contrast? None   Final Result      1. Small to moderate right pleural effusion and small left pleural effusion with subsegmental atelectasis. No visible pneumonia.      2. Mild mesenteric and retroperitoneal edema, trace ascites and periportal edema. Hepatocellular disease is a possibility. Correlate with liver function tests.       XR CHEST (2 VW)   Final Result      No evidence for acute cardiopulmonary disease.                  Assessment/Plan:   Ms. Barbie Carroll is a 27-year-old  female who presented to the ED with a one-week history of chills, sinus congestion, cough, dyspnea, abdominal pain, and low back and was admitted on 2/04/2023 for acute urosepsis with bilateral pleural effusions.  She was transferred to the ICU on 2/08/2023 for concerns of impending acute respiratory failure in the setting of lactic acidosis secondary to acute  liver injury. Acute liver injury - Likely 2/2 HBV infection with HBsAg (+) on 2/3. History of acute hepatitis C infection - Treated with ledipasvir-sofosbuvir (genotype 1) in 02/2020 at [facility]. FU HCV quantitative RNA by PCR without any copies on 6/18/2020. Lactic acidosis, type B - likely 2/2 acute liver injury  VBG (2/8, 5pm):  pH 7.303, pCO2 40.8, bicarb 20.2   - Anti-HBc, HBeAg, HBV DNA - pending   - F-actin Ab, THOR, IgM, IgG, IgA, HIV screen, HepC RNA PCR - pending collection to further assess for alternative etiologies of the acute liver failure  - MELD-Na score of 20. Referral to  liver transplant center - bed availability pending; Poor transplant candidate, wait time is > 2 days.  - INR Q4H to monitor for decompensation  - Vit K 10 mg IV administered on 2/8  - LFTs QD     Moderate pericardial effusion - seen on CT scan 2/8  Dilated LA/LV/RV - seen on CT scan 2/8 - possibly DCM 2/2 chronic methamphetamine use  History of crystal methamphetamine use - UDS on 2/3 positive for methamphetamine. Patient reports recent Adderall use. - Cardiology consulted   - TTE w/ EF 15%  - Continuous monitoring on telemetry  - Cont lasix gtt, good UOP  - RFP q12     Large layering right-sided pleural effusion - possibly 2/2 acute cardiac vs acute liver failure - worsening since admission  Small layering left-sided pleural effusion - possibly 2/2 acute cardiac vs acute liver failure - slight worsening since admission  - Monitor closely in the ICU for decompensation necessitating intubation. Patient has confirmed she would like to be intubated if needed. - Lasix 20 mg IV in ED  - Cont lasix gtt, good UOP  - RFP q12    Acute back pain  - Consider MRI spine to assess for possible osteomyelitis in patient with h/o IVDU     Acute complicated cystitis (POA) - concerns for urosepsis  Urine Cx 2/3:  S. agalactiae < 10,000 CFU/mL mixed skin/urogenital cruzito  - CTX 2/3-2/7  - BCx x 2 collected. BCx 1 - NGTD.   - Strict I/Os  - Daily weights     Essential hypertension - uncontrolled  - Hold home meds labetalol, nifedipine  - Consider restarting labetalol after IV Lasix     Asthma, mild  - Albuterol PRN     Bipolar affective disorder  History of bulimia nervosa  - Psychiatry consulted  - Rebekah Hernández in setting of acute liver failure     Nicotine dependence  - Nicotine patch        Code Status:  Full Code  FEN:  ADULT DIET; Regular; 3 carb choices (45 gm/meal); Low Fat/Low Chol/High Fiber/2 gm Na; Low Sodium (2 gm)  PPX:  SCDs  DISPO:  ICU    Eber Sutherland MD, PGY-1  02/10/23  7:55 AM    This patient has been staffed and discussed with Citlali Francis MD    Patient examined, findings as discussed with Dr. Salina Valenzuela. Agree with assessment and plan. Diuresis for severe heart failure has been helpful, relieving respiratory distress. Hepatic enzymes have improved significantly. Blood pressure remains in normal range. Function remains normal.  She is stable for transfer to medical floor, continue diuretic therapy.   Discussed with cardiology service

## 2023-02-10 NOTE — PROGRESS NOTES
Clinical Pharmacy Progress Note  Medication History     Admit Date: 2/3/2023    List of of current medications patient is taking is complete. Home Medication list in EPIC updated to reflect changes noted below. Source of information: patient interview, pharmacy fills    Patient's home pharmacy: [facility] (as of last year)     Changes made to medication list:   Medications removed: Albuterol MDI prn  Buspirone 10mg daily  Tums prn  Latuda 40mg (last filled 2016)  Prenatal vit  Ibuprofen prn  Loratadine  Nifedipine ER 90mg daily - filled once in Oct 2022  Labetolol 200mg daily - filled once in Oct 2022  Vortioxetine 10mg daily    Other notes:   Patient denied any current Rx or OTC medications. Will discuss with ICU team.       No current outpatient medications      Please call with any questions.   Erwin Abbott PharmD., BCPS   2/10/2023 9:51 AM  Wireless: [phone]

## 2023-02-10 NOTE — PROGRESS NOTES
Patient has no complaints of pain at this time. All scheduled medications have been given without difficulty and scheduled labs have been sent. Bed is in lowest position with wheels locked and bed alarm active. Call light is within reach.

## 2023-02-10 NOTE — PLAN OF CARE
Problem: Discharge Planning  Goal: Discharge to home or other facility with appropriate resources  Outcome: Progressing  Flowsheets (Taken 2/9/2023 2000)  Discharge to home or other facility with appropriate resources: Identify barriers to discharge with patient and caregiver     Problem: Safety - Adult  Goal: Free from fall injury  2/9/2023 2122 by Vera Aguilar RN  Outcome: Progressing     Problem: ABCDS Injury Assessment  Goal: Absence of physical injury  2/9/2023 2122 by Vera Aguilar RN  Outcome: Progressing     Problem: Pain  Goal: Verbalizes/displays adequate comfort level or baseline comfort level  2/9/2023 2122 by Vera Aguilar RN  Outcome: Progressing

## 2023-02-10 NOTE — PROGRESS NOTES
Pt A/Ox4. Following commands appropriately. Denies pain. VSS. HR 100s, sinus tach. Afebrile. Room air. Denies shortness of breath. Up to the bathroom, endorses dizziness when standing. Lasix gtt infusing, midline in place. Mg and potassium replaced per orders. Denies nausea after eating. Call light and personal belongings within reach. Bed locked/ in lowest position with bed alarm on. Pt downgraded.

## 2023-02-10 NOTE — PROGRESS NOTES
Midline placed at 1315. Lasix gtt started started. Denies pain. Nausea improved throughout the day. Boyfriend at bedside, updated on plan of care. Call light within reach. Bed locked/ in lowest position, bed alarm on.

## 2023-02-10 NOTE — PLAN OF CARE
Problem: Safety - Adult  Goal: Free from fall injury  2/10/2023 1119 by Samuel Elise RN  Outcome: Progressing     Problem: ABCDS Injury Assessment  Goal: Absence of physical injury  2/10/2023 1119 by Samuel Elise RN  Outcome: Progressing     Problem: Pain  Goal: Verbalizes/displays adequate comfort level or baseline comfort level  2/10/2023 1119 by Samuel Elise RN  Outcome: Progressing

## 2023-02-10 NOTE — PROGRESS NOTES
[address] [place] [address]  GI Progress Note          Raul Sexton is a 28 y.o. female patient. 1. Acute pyelonephritis    2. Bilateral pleural effusion    3.  Septicemia ([address]        Admit Date: 2/3/2023    Subjective:       Alert more cooperative denies abd pain N/V, no Gi bleeding several BM's    Scheduled Meds:   lactulose enema   Rectal Once    lactulose  20 g Oral TID    lidocaine 1 % injection  5 mL IntraDERmal Once    sodium chloride flush  5-40 mL IntraVENous 2 times per day    [Held by provider] lurasidone  20 mg Oral Daily    polyethylene glycol  17 g Oral Daily    sodium chloride flush  10 mL IntraVENous 2 times per day    nicotine  1 patch TransDERmal Daily       Continuous Infusions:   furosemide (LASIX) 1mg/mL infusion 5 mg/hr (02/10/23 0420)    sodium chloride      sodium chloride 5 mL/hr at 02/08/23 2215       Objective:       Patient Vitals for the past 24 hrs:   BP Temp Temp src Pulse Resp SpO2 Weight   02/10/23 0600 (!) 141/106 -- -- (!) 105 (!) 36 -- --   02/10/23 0541 -- -- -- -- -- -- 173 lb 15.1 oz (78.9 kg)   02/10/23 0500 (!) 146/106 -- -- (!) 109 (!) 31 -- --   02/10/23 0400 (!) 132/110 99 °F (37.2 °C) Oral (!) 101 (!) 33 97 % --   02/10/23 0300 (!) 138/118 -- -- (!) 106 (!) 35 -- --   02/10/23 0200 (!) 134/109 -- -- (!) 101 (!) 33 -- --   02/10/23 0100 (!) 125/95 -- -- (!) 102 26 -- --   02/10/23 0000 (!) 142/114 99.4 °F (37.4 °C) Oral (!) 105 (!) 34 98 % --   02/09/23 2300 (!) 132/105 -- -- (!) 103 (!) 32 -- --   02/09/23 2200 (!) 135/109 -- -- (!) 107 (!) 38 -- --   02/09/23 2100 (!) 132/106 -- -- (!) 108 (!) 38 -- --   02/09/23 2000 (!) 132/101 99 °F (37.2 °C) Oral (!) 109 30 96 % --   02/09/23 1900 (!) 127/101 -- -- (!) 111 (!) 40 -- --   02/09/23 1830 (!) 146/103 -- -- (!) 109 (!) 32 -- --   02/09/23 1800 (!) 137/110 -- -- (!) 107 20 -- --   02/09/23 1730 -- -- -- (!) 116 30 -- --   02/09/23 1700 (!) 140/117 99.6 °F (37.6 °C) Temporal (!) 118 (!) 41 -- --   02/09/23 1600 (!) 131/106 -- -- (!) 114 (!) 35 -- --   23 1530 (!) 138/108 -- -- (!) 117 22 -- --   23 1500 (!) 150/129 -- -- (!) 120 (!) 40 -- --   23 1430 -- -- -- (!) 123 25 -- --   23 1400 (!) 141/127 -- -- (!) 117 30 -- --   23 1330 (!) 125/110 -- -- (!) 116 (!) 42 -- --   23 1300 (!) 136/108 -- -- (!) 118 25 -- --   23 1200 (!) 153/125 99.8 °F (37.7 °C) Temporal (!) 119 (!) 41 93 % --   23 1130 (!) 152/122 -- -- (!) 118 26 -- --   23 1100 (!) 153/124 -- -- (!) 121 (!) 35 -- --   23 1030 (!) 151/102 -- -- (!) 120 (!) 31 -- --   23 1000 (!) 164/138 -- -- (!) 118 (!) 35 -- --   23 0930 (!) 138/123 -- -- (!) 116 (!) 39 -- --   23 0900 (!) 141/120 -- -- (!) 114 11 -- --   23 0830 (!) 133/108 -- -- (!) 115 (!) 31 -- --   23 0800 (!) 156/127 -- -- (!) 113 (!) 31 -- --   23 0753 -- 99 °F (37.2 °C) Temporal -- -- -- --       Exam:  VITALS:  BP (!) 141/106   Pulse (!) 105   Temp 99 °F (37.2 °C) (Oral)   Resp (!) 36   Ht 5' 3\" (1.6 m)   Wt 173 lb 15.1 oz (78.9 kg)   SpO2 97%   BMI 30.81 kg/m²   TEMPERATURE:  Current - Temp: 99 °F (37.2 °C);  Max - Temp  Av.3 °F (37.4 °C)  Min: 99 °F (37.2 °C)  Max: 99.8 °F (37.7 °C)    NAD  General appearance: alert, appears stated age, cooperative and no distress  Abdomen: soft minimal upper tend no G/R/M  AAOx3    Recent Labs     23  1517 23  1028   WBC 13.9* 15.0*   HGB 11.3* 11.9*   HCT 37.0 38.6   MCV 84.1 84.9    406     Recent Labs     23  1028 23  1405 02/10/23  0434   * 135* 136   K 3.8 3.7 3.1*    107 101   CO2 17* 16* 26   PHOS  --  2.3* 2.1*   BUN 17 17 16   CREATININE 0.6 0.5* 0.6     Recent Labs     23  1517 23  1028   AST 1,254* 989*   ALT 1,101* 1,043*   BILITOT 0.7 0.8   ALKPHOS 120 119     Recent Labs     23  1514   LIPASE 32.0     Recent Labs     23  1517 23  1828 23  1028 23  1405 23 02/10/23  0434   PROT 5.1*  --  4.8*  --   --   --    INR  --    < > 2.45* 2.71* 2.13* 1.87*    < > = values in this interval not displayed. Assessment:       Principal Problem:    Acute pyelonephritis  Active Problems:    Pyelonephritis    Acute systolic congestive heart failure (HCC)    Primary hypertension    Acute hepatitis B    Bipolar affective disorder, current episode manic ([address]  Resolved Problems:    * No resolved hospital problems. *      Now knowing her cardiac EF is 10-15% suspect this is all ischemic hepatitis and passive congestion. Mental status is better, INR starting to decrease, liver chem are pending today.   Hep B s Ag positive is unusual in the presence of high titer hep B s Ab, will await hep B DNA and Hep C RNA  Autoimmune studies are negative    Recommendations:       Serial labs  Cardiac work up  Follow up pending serologies  Reg diet with supplements     Anton Nunez MD  2/10/2023  7:15 AM

## 2023-02-11 LAB
ALBUMIN SERPL-MCNC: 2.3 G/DL (ref 3.4–5)
ALBUMIN SERPL-MCNC: 2.8 G/DL (ref 3.4–5)
ALP BLD-CCNC: 104 U/L (ref 40–129)
ALT SERPL-CCNC: 633 U/L (ref 10–40)
ANION GAP SERPL CALCULATED.3IONS-SCNC: 10 MMOL/L (ref 3–16)
ANION GAP SERPL CALCULATED.3IONS-SCNC: 6 MMOL/L (ref 3–16)
AST SERPL-CCNC: 402 U/L (ref 15–37)
BILIRUB SERPL-MCNC: 0.5 MG/DL (ref 0–1)
BILIRUBIN DIRECT: <0.2 MG/DL (ref 0–0.3)
BILIRUBIN, INDIRECT: ABNORMAL MG/DL (ref 0–1)
BUN BLDV-MCNC: 10 MG/DL (ref 7–20)
BUN BLDV-MCNC: 11 MG/DL (ref 7–20)
CALCIUM SERPL-MCNC: 7.5 MG/DL (ref 8.3–10.6)
CALCIUM SERPL-MCNC: 7.8 MG/DL (ref 8.3–10.6)
CHLORIDE BLD-SCNC: 100 MMOL/L (ref 99–110)
CHLORIDE BLD-SCNC: 97 MMOL/L (ref 99–110)
CO2: 32 MMOL/L (ref 21–32)
CO2: 34 MMOL/L (ref 21–32)
CREAT SERPL-MCNC: 0.5 MG/DL (ref 0.6–1.1)
CREAT SERPL-MCNC: 0.7 MG/DL (ref 0.6–1.1)
F-ACTIN AB IGG: 8 UNITS (ref 0–19)
GFR SERPL CREATININE-BSD FRML MDRD: >60 ML/MIN/{1.73_M2}
GFR SERPL CREATININE-BSD FRML MDRD: >60 ML/MIN/{1.73_M2}
GLUCOSE BLD-MCNC: 116 MG/DL (ref 70–99)
GLUCOSE BLD-MCNC: 119 MG/DL (ref 70–99)
HBV QNT LOG, IU/ML: 2.35 LOG IU/ML
HBV QNT, IU/ML: 222 IU/ML
HCV QNT BY NAAT IU/ML: ABNORMAL IU/ML
HCV QNT BY NAAT LOG IU/ML: 5.44 LOG IU/ML
INR BLD: 1.36 (ref 0.87–1.14)
INR BLD: 1.88 (ref 0.87–1.14)
INTERPRETATION: DETECTED
INTERPRETATION: DETECTED
MAGNESIUM: 1.5 MG/DL (ref 1.8–2.4)
MAGNESIUM: 1.8 MG/DL (ref 1.8–2.4)
PHOSPHORUS: 2.7 MG/DL (ref 2.5–4.9)
PHOSPHORUS: 2.9 MG/DL (ref 2.5–4.9)
POTASSIUM SERPL-SCNC: 2.9 MMOL/L (ref 3.5–5.1)
POTASSIUM SERPL-SCNC: 3.8 MMOL/L (ref 3.5–5.1)
PROTHROMBIN TIME: 16.7 SEC (ref 11.7–14.5)
PROTHROMBIN TIME: 21.6 SEC (ref 11.7–14.5)
SODIUM BLD-SCNC: 138 MMOL/L (ref 136–145)
SODIUM BLD-SCNC: 141 MMOL/L (ref 136–145)
TOTAL PROTEIN: 4.6 G/DL (ref 6.4–8.2)

## 2023-02-11 PROCEDURE — 6370000000 HC RX 637 (ALT 250 FOR IP): Performed by: STUDENT IN AN ORGANIZED HEALTH CARE EDUCATION/TRAINING PROGRAM

## 2023-02-11 PROCEDURE — 36415 COLL VENOUS BLD VENIPUNCTURE: CPT

## 2023-02-11 PROCEDURE — 6370000000 HC RX 637 (ALT 250 FOR IP): Performed by: HOSPITALIST

## 2023-02-11 PROCEDURE — 99233 SBSQ HOSP IP/OBS HIGH 50: CPT | Performed by: INTERNAL MEDICINE

## 2023-02-11 PROCEDURE — 2580000003 HC RX 258: Performed by: STUDENT IN AN ORGANIZED HEALTH CARE EDUCATION/TRAINING PROGRAM

## 2023-02-11 PROCEDURE — 6360000002 HC RX W HCPCS: Performed by: STUDENT IN AN ORGANIZED HEALTH CARE EDUCATION/TRAINING PROGRAM

## 2023-02-11 PROCEDURE — 85610 PROTHROMBIN TIME: CPT

## 2023-02-11 PROCEDURE — 86692 HEPATITIS DELTA AGENT ANTBDY: CPT

## 2023-02-11 PROCEDURE — 83735 ASSAY OF MAGNESIUM: CPT

## 2023-02-11 PROCEDURE — 87799 DETECT AGENT NOS DNA QUANT: CPT

## 2023-02-11 PROCEDURE — 80076 HEPATIC FUNCTION PANEL: CPT

## 2023-02-11 PROCEDURE — 80069 RENAL FUNCTION PANEL: CPT

## 2023-02-11 PROCEDURE — 2060000000 HC ICU INTERMEDIATE R&B

## 2023-02-11 PROCEDURE — 6370000000 HC RX 637 (ALT 250 FOR IP): Performed by: INTERNAL MEDICINE

## 2023-02-11 RX ORDER — NICOTINE 21 MG/24HR
1 PATCH, TRANSDERMAL 24 HOURS TRANSDERMAL ONCE
Status: COMPLETED | OUTPATIENT
Start: 2023-02-11 | End: 2023-02-12

## 2023-02-11 RX ORDER — LACTULOSE 10 G/15ML
20 SOLUTION ORAL 2 TIMES DAILY
Status: DISCONTINUED | OUTPATIENT
Start: 2023-02-11 | End: 2023-02-14

## 2023-02-11 RX ADMIN — SODIUM CHLORIDE, PRESERVATIVE FREE 10 ML: 5 INJECTION INTRAVENOUS at 10:03

## 2023-02-11 RX ADMIN — SODIUM CHLORIDE, PRESERVATIVE FREE 10 ML: 5 INJECTION INTRAVENOUS at 20:49

## 2023-02-11 RX ADMIN — FUROSEMIDE 5 MG/HR: 10 INJECTION INTRAMUSCULAR; INTRAVENOUS at 01:11

## 2023-02-11 RX ADMIN — LACTULOSE 20 G: 20 SOLUTION ORAL at 20:49

## 2023-02-11 RX ADMIN — LACTULOSE 20 G: 20 SOLUTION ORAL at 10:03

## 2023-02-11 ASSESSMENT — PAIN SCALES - GENERAL
PAINLEVEL_OUTOF10: 0

## 2023-02-11 ASSESSMENT — PAIN SCALES - WONG BAKER
WONGBAKER_NUMERICALRESPONSE: 0

## 2023-02-11 NOTE — PROGRESS NOTES
[facility] Daily Progress Note      Admit Date:  2/3/2023    Subjective:  Ms. Jimi Parker was seen and examined. F/U CHF/cmyop. Restless night.      Objective:   /74   Pulse 96   Temp 98.6 °F (37 °C) (Temporal)   Resp 29   Ht 5' 3\" (1.6 m)   Wt 175 lb 7.8 oz (79.6 kg)   SpO2 97%   BMI 31.09 kg/m²     Intake/Output Summary (Last 24 hours) at 2/11/2023 0802  Last data filed at 2/10/2023 1900  Gross per 24 hour   Intake --   Output 3550 ml   Net -3550 ml       TELEMETRY: Sinus     Physical Exam:  General:  Awake, alert, NAD  Skin:  Warm and dry  Neck:  JVP difficult  Chest:  decreased BS, respiration normal  Cardiovascular:  RRR S1S2  Abdomen:  Soft non tender  Extremities:  tr edema    Medications:    magnesium sulfate  4,000 mg IntraVENous Once    enoxaparin  40 mg SubCUTAneous Daily    melatonin  10 mg Oral Nightly    acetaminophen  650 mg Oral Once    lactulose enema   Rectal Once    lactulose  20 g Oral TID    lidocaine 1 % injection  5 mL IntraDERmal Once    sodium chloride flush  5-40 mL IntraVENous 2 times per day    [Held by provider] lurasidone  20 mg Oral Daily    polyethylene glycol  17 g Oral Daily    sodium chloride flush  10 mL IntraVENous 2 times per day    nicotine  1 patch TransDERmal Daily      furosemide (LASIX) 1mg/mL infusion 5 mg/hr (02/11/23 0111)    sodium chloride      sodium chloride 5 mL/hr at 02/08/23 2215     sodium chloride flush, sodium chloride, docusate sodium, perflutren lipid microspheres, LORazepam, hydrOXYzine HCl, sodium chloride flush, sodium chloride, potassium chloride, magnesium sulfate, promethazine **OR** ondansetron    Lab Data:  CBC:   Recent Labs     02/08/23  1517 02/09/23  1028   WBC 13.9* 15.0*   HGB 11.3* 11.9*   HCT 37.0 38.6   MCV 84.1 84.9    406     BMP:   Recent Labs     02/10/23  0434 02/10/23  1802 02/11/23  0632    137 138   K 3.1* 2.6* 2.9*    102 100   CO2 26 28 32   PHOS 2.1* 1.6* 2.9   BUN 16 10 10   CREATININE 0.6 0.6 0.5*     LIVER PROFILE:   Recent Labs     02/08/23  1514 02/08/23  1517 02/09/23  1028 02/10/23  1107 02/11/23  0632   AST  --    < > 989* 416* 402*   ALT  --    < > 1,043* 618* 633*   LIPASE 32.0  --   --   --   --    BILIDIR  --   --   --  <0.2 <0.2   BILITOT  --    < > 0.8 0.4 0.5   ALKPHOS  --    < > 119 93 104    < > = values in this interval not displayed. PT/INR:   Recent Labs     02/10/23  1802 02/10/23  2347 02/11/23  0632   PROTIME 19.4* 21.6* 16.7*   INR 1.64* 1.88* 1.36*     APTT: No results for input(s): APTT in the last 72 hours. BNP:  No results for input(s): BNP in the last 72 hours. IMAGING:     Assessment:  Patient Active Problem List    Diagnosis Date Noted    Acute systolic congestive heart failure ([facility] 02/09/2023    Primary hypertension 02/09/2023    Acute hepatitis B 02/09/2023    Bipolar affective disorder, current episode manic ([facility] [address] 02/09/2023    Pyelonephritis 02/07/2023    Acute pyelonephritis 02/04/2023    Normal pregnancy in multigravida in third trimester 08/18/2017       Plan:  Continues with excellent diuresis. On RA. Cr remains stable. . Will hold on adding entresto until bp better. Continue diuresis.         Core Measures:  Discharge instructions:   LVEF documented:   ACEI for LV dysfunction:   Smoking Cessation:    Jeyson Moyer MD, MD 2/11/2023 8:02 AM

## 2023-02-11 NOTE — PROGRESS NOTES
Hospitalist Progress Note      Name:  Monik Junior /Age/Sex: 1995  (28 y.o. female)   MRN & CSN:  [id number] & [id number] Admission Date/Time: 2/3/2023  6:45 PM   Location:  [id number] PCP: No primary care provider on file. Hospital Day: 9    Assessment and Plan:   Ms. Monik Junior is a 65-year-old  female with medical history significant for bipolar affective disorder, hepatitis C infection in 2020 treated with ledipasvir-sofosbuvir, essential hypertension, asthma, and h/o crystal meth use who presented to the ED with a one-week history of chills, sinus congestion, cough, dyspnea, abdominal pain, and low back and was admitted on 8998 for acute complicated cystitis with bilateral pleural effusions for which ceftriaxone and IVF were initiated. Methamphetamine was found on UDS. Patient reports that she hasn't used crystal meth within the last year; however, reports recent use of Adderall for ADHD. Biventricular failure acute  Cardiology following, etiology unclear possibly stress-induced/polysubstance abuse  On iv lasix drip  Monitor renal function and serum electrolytes     Hypokalemia : serum K today 2.9  On replacement protocol     Elevated LFTs probably related to passive liver congestion secondary to right-sided heart failure, Hep BsAg positive, GI following      H/o BIpolar disorder      Rt sided pleural effusion     Diet ADULT DIET; Regular; 3 carb choices (45 gm/meal); Low Fat/Low Chol/High Fiber/2 gm Na; Low Sodium (2 gm); 1200 ml   DVT Prophylaxis [x] Lovenox, []  Heparin, [] SCDs, [] Ambulation   GI Prophylaxis [] PPI,  [] H2 Blocker,  [] Carafate,  [] Diet/Tube Feeds   Code Status Full Code   Disposition Patient requires continued admission due to    MDM [] Low, [] Moderate,[]  High  Patient's risk as above due to      History of Present Illness:     Still on iv lasix drip step down unit   Objective:      Intake/Output Summary (Last 24 hours) at 2/11/2023 1119  Last data filed at 2/10/2023 1900  Gross per 24 hour   Intake --   Output 2700 ml   Net -2700 ml      Vitals:   Vitals:    02/11/23 0530   BP: 108/74   Pulse: 96   Resp: 29   Temp:    SpO2:      Physical Exam:   GEN Awake.  Alert , not in respiratory distress, not in pain  HEENT: PEERLA, , supple neck,   Chest: air entry equal bilaterally, no wheezing or crepitation  Heart: S1 and S2 heard, no murmur, no gallop or rub, regular rate  Abdomen: soft, ND , Nt, +BS  Extremities: no cyanosis, tenderness or erythema, peripheral pulses audible  Neurology: alert, confused , able to move 4 limbs    Medications:   Medications:    lactulose  20 g Oral BID    magnesium sulfate  4,000 mg IntraVENous Once    enoxaparin  40 mg SubCUTAneous Daily    melatonin  10 mg Oral Nightly    acetaminophen  650 mg Oral Once    lidocaine 1 % injection  5 mL IntraDERmal Once    sodium chloride flush  5-40 mL IntraVENous 2 times per day    [Held by provider] lurasidone  20 mg Oral Daily    polyethylene glycol  17 g Oral Daily    sodium chloride flush  10 mL IntraVENous 2 times per day    nicotine  1 patch TransDERmal Daily      Infusions:    furosemide (LASIX) 1mg/mL infusion 5 mg/hr (02/11/23 0111)    sodium chloride      sodium chloride 5 mL/hr at 02/08/23 2215     PRN Meds: sodium chloride flush, 5-40 mL, PRN  sodium chloride, 25 mL, PRN  docusate sodium, 100 mg, BID PRN  perflutren lipid microspheres, 1.5 mL, ONCE PRN  LORazepam, 1 mg, Q6H PRN  hydrOXYzine HCl, 10 mg, TID PRN  sodium chloride flush, 10 mL, PRN  sodium chloride, , PRN  potassium chloride, 10 mEq, PRN  magnesium sulfate, 2,000 mg, PRN  promethazine, 12.5 mg, Q6H PRN   Or  ondansetron, 4 mg, Q6H PRN          Electronically signed by Fely Dean MD on 2/11/2023 at 11:19 AM

## 2023-02-11 NOTE — PLAN OF CARE
Problem: Discharge Planning  Goal: Discharge to home or other facility with appropriate resources  Outcome: Progressing     Problem: Safety - Adult  Goal: Free from fall injury  2/11/2023 0057 by Huan Montes RN  Outcome: Progressing  2/10/2023 1119 by Rashard Burgos RN  Outcome: Progressing     Problem: ABCDS Injury Assessment  Goal: Absence of physical injury  2/11/2023 0057 by Huan Montes RN  Outcome: Progressing  2/10/2023 1119 by Rashard Burgos RN  Outcome: Progressing     Problem: Pain  Goal: Verbalizes/displays adequate comfort level or baseline comfort level  2/11/2023 0057 by Huan Montes RN  Outcome: Progressing  2/10/2023 1119 by Rashard Burgos RN  Outcome: Progressing     Problem: Respiratory - Adult  Goal: Achieves optimal ventilation and oxygenation  Outcome: Progressing  Flowsheets (Taken 2/10/2023 2000)  Achieves optimal ventilation and oxygenation: Assess for changes in respiratory status     Problem: Cardiovascular - Adult  Goal: Maintains optimal cardiac output and hemodynamic stability  Outcome: Progressing  Goal: Absence of cardiac dysrhythmias or at baseline  Outcome: Progressing     Problem: Metabolic/Fluid and Electrolytes - Adult  Goal: Electrolytes maintained within normal limits  Outcome: Progressing  Goal: Hemodynamic stability and optimal renal function maintained  Outcome: Progressing     Problem: Nutrition Deficit:  Goal: Optimize nutritional status  Outcome: Progressing

## 2023-02-11 NOTE — PROGRESS NOTES
Pt refused furosemide gtt at 0300, requested to have it paused for the night. Pt states she is going to the bathroom too frequently to sleep. Pt became mildly agitated. Furosemide gtt paused from 4215-1282. Hospitalist made aware.

## 2023-02-11 NOTE — PROGRESS NOTES
[address] [place] [address]  GI Progress Note          Deshawn Garza is a 28 y.o. female patient. 1. Acute pyelonephritis    2. Bilateral pleural effusion    3.  Septicemia ([address]        Admit Date: 2/3/2023    Subjective:       No c/o more cooperative is hungry    Scheduled Meds:   magnesium sulfate  4,000 mg IntraVENous Once    enoxaparin  40 mg SubCUTAneous Daily    melatonin  10 mg Oral Nightly    acetaminophen  650 mg Oral Once    lactulose enema   Rectal Once    lactulose  20 g Oral TID    lidocaine 1 % injection  5 mL IntraDERmal Once    sodium chloride flush  5-40 mL IntraVENous 2 times per day    [Held by provider] lurasidone  20 mg Oral Daily    polyethylene glycol  17 g Oral Daily    sodium chloride flush  10 mL IntraVENous 2 times per day    nicotine  1 patch TransDERmal Daily       Objective:       Patient Vitals for the past 24 hrs:   BP Temp Temp src Pulse Resp SpO2 Weight   02/11/23 0600 -- -- -- -- -- -- 175 lb 7.8 oz (79.6 kg)   02/11/23 0530 108/74 -- -- 96 29 -- --   02/11/23 0500 113/86 -- -- 97 30 -- --   02/11/23 0430 112/84 -- -- 100 24 -- --   02/11/23 0400 118/82 98.6 °F (37 °C) Temporal 98 15 -- --   02/11/23 0330 115/73 -- -- 99 27 -- --   02/11/23 0300 110/76 -- -- 100 24 -- --   02/11/23 0230 (!) 129/90 -- -- 86 (!) 33 -- --   02/11/23 0200 -- -- -- (!) 103 (!) 33 -- --   02/11/23 0130 107/66 -- -- (!) 106 (!) 31 -- --   02/11/23 0100 113/82 -- -- (!) 107 25 -- --   02/11/23 0030 108/74 -- -- (!) 103 (!) 37 -- --   02/11/23 0000 (!) 122/96 99.9 °F (37.7 °C) Temporal (!) 110 17 -- --   02/10/23 2330 119/86 -- -- (!) 103 27 -- --   02/10/23 2300 112/84 -- -- (!) 103 30 -- --   02/10/23 2230 (!) 129/93 -- -- (!) 101 30 -- --   02/10/23 2200 114/85 -- -- (!) 102 29 -- --   02/10/23 2130 105/70 -- -- (!) 106 (!) 35 -- --   02/10/23 2100 118/85 -- -- (!) 106 22 -- --   02/10/23 2030 109/67 -- -- (!) 107 (!) 35 -- --   02/10/23 2000 (!) 110/92 100 °F (37.8 °C) Temporal (!) 107 18 -- -- 02/10/23 1940 -- (!) 66.2 °F (19 °C) -- -- -- -- --   02/10/23 1800 119/85 -- -- (!) 107 27 -- --   02/10/23 1700 (!) 119/91 99.9 °F (37.7 °C) Temporal (!) 102 (!) 36 -- --   02/10/23 1600 117/82 -- -- (!) 109 26 97 % --   02/10/23 1500 113/85 -- -- (!) 107 (!) 35 -- --   02/10/23 1400 (!) 120/90 -- -- (!) 109 (!) 34 -- --   02/10/23 1300 -- -- -- (!) 108 23 -- --   02/10/23 1200 121/82 100 °F (37.8 °C) Temporal (!) 107 21 96 % --   02/10/23 1100 (!) 130/97 -- -- (!) 104 30 -- --   02/10/23 1000 (!) 124/90 -- -- (!) 105 29 -- --   02/10/23 0900 (!) 129/94 -- -- (!) 101 (!) 31 -- --       Exam:  VITALS:  /74   Pulse 96   Temp 98.6 °F (37 °C) (Temporal)   Resp 29   Ht 5' 3\" (1.6 m)   Wt 175 lb 7.8 oz (79.6 kg)   SpO2 97%   BMI 31.09 kg/m²   TEMPERATURE:  Current - Temp: 98.6 °F (37 °C); Max - Temp  Av.1 °F (34.5 °C)  Min: 66.2 °F (19 °C)  Max: 100 °F (37.8 °C)    NAD  General appearance: alert, appears stated age, cooperative and no distress  Abdomen: soft, non-tender; bowel sounds normal; no masses,  no organomegaly  AAOx3, No asterixis or encephalopathy      Recent Labs     23  1517 23  1028   WBC 13.9* 15.0*   HGB 11.3* 11.9*   HCT 37.0 38.6   MCV 84.1 84.9    406     Recent Labs     02/10/23  0434 02/10/23  1802 23  0632    137 138   K 3.1* 2.6* 2.9*    102 100   CO2 26 28 32   PHOS 2.1* 1.6* 2.9   BUN 16 10 10   CREATININE 0.6 0.6 0.5*     Recent Labs     23  1028 02/10/23  1107 23  0632   * 416* 402*   ALT 1,043* 618* 633*   BILIDIR  --  <0.2 <0.2   BILITOT 0.8 0.4 0.5   ALKPHOS 119 93 104     Recent Labs     23  1514   LIPASE 32.0     Recent Labs     23  1028 23  1405 02/10/23  1107 02/10/23  1802 02/10/23  2347 23  0632   PROT 4.8*  --  3.6*  --   --  4.6*   INR 2.45*   < > 1.99* 1.64* 1.88* 1.36*    < > = values in this interval not displayed.      Assessment:       Principal Problem:    Acute pyelonephritis  Active Problems:    Pyelonephritis    Acute systolic congestive heart failure (HCC)    Primary hypertension    Acute hepatitis B    Bipolar affective disorder, current episode manic ([address]  Resolved Problems:    * No resolved hospital problems. *      Clinically improved mental status better liver chem improved. Suspect both chronic Hep B and C and ischemia. Could have a strain of hep B not responsive to her hep B s Ab INR much better. Hep B titer is low at present but Hep B e Ag confirms she is infectious, despite high titer s Ab. Continue supportive care, nutrition will be important. Hep C PCR positive also. Autoimmune and HIV negative.  Decrease Lactulose to BID  Check Hepatitis D (ordered)      Kristian Richardson MD  2/11/2023  8:33 AM

## 2023-02-12 LAB
A/G RATIO: 1.1 (ref 1.1–2.2)
ALBUMIN SERPL-MCNC: 3.3 G/DL (ref 3.4–5)
ALP BLD-CCNC: 129 U/L (ref 40–129)
ALT SERPL-CCNC: 746 U/L (ref 10–40)
ANION GAP SERPL CALCULATED.3IONS-SCNC: 11 MMOL/L (ref 3–16)
AST SERPL-CCNC: 401 U/L (ref 15–37)
BASOPHILS ABSOLUTE: 0.1 K/UL (ref 0–0.2)
BASOPHILS RELATIVE PERCENT: 0.6 %
BILIRUB SERPL-MCNC: 0.6 MG/DL (ref 0–1)
BILIRUBIN DIRECT: <0.2 MG/DL (ref 0–0.3)
BILIRUBIN, INDIRECT: ABNORMAL MG/DL (ref 0–1)
BUN BLDV-MCNC: 10 MG/DL (ref 7–20)
CALCIUM SERPL-MCNC: 8.5 MG/DL (ref 8.3–10.6)
CHLORIDE BLD-SCNC: 95 MMOL/L (ref 99–110)
CO2: 34 MMOL/L (ref 21–32)
CREAT SERPL-MCNC: 0.7 MG/DL (ref 0.6–1.1)
EOSINOPHILS ABSOLUTE: 0.3 K/UL (ref 0–0.6)
EOSINOPHILS RELATIVE PERCENT: 3.9 %
GFR SERPL CREATININE-BSD FRML MDRD: >60 ML/MIN/{1.73_M2}
GLUCOSE BLD-MCNC: 92 MG/DL (ref 70–99)
HCT VFR BLD CALC: 40.4 % (ref 36–48)
HEMOGLOBIN: 12.6 G/DL (ref 12–16)
INR BLD: 1.1 (ref 0.87–1.14)
INR BLD: 1.11 (ref 0.87–1.14)
INR BLD: 1.13 (ref 0.87–1.14)
LYMPHOCYTES ABSOLUTE: 4 K/UL (ref 1–5.1)
LYMPHOCYTES RELATIVE PERCENT: 46.1 %
MAGNESIUM: 1.6 MG/DL (ref 1.8–2.4)
MCH RBC QN AUTO: 26.2 PG (ref 26–34)
MCHC RBC AUTO-ENTMCNC: 31.2 G/DL (ref 31–36)
MCV RBC AUTO: 83.9 FL (ref 80–100)
MONOCYTES ABSOLUTE: 0.6 K/UL (ref 0–1.3)
MONOCYTES RELATIVE PERCENT: 7.2 %
NEUTROPHILS ABSOLUTE: 3.7 K/UL (ref 1.7–7.7)
NEUTROPHILS RELATIVE PERCENT: 42.2 %
PDW BLD-RTO: 18 % (ref 12.4–15.4)
PHOSPHORUS: 3.3 MG/DL (ref 2.5–4.9)
PLATELET # BLD: 285 K/UL (ref 135–450)
PMV BLD AUTO: 8.3 FL (ref 5–10.5)
POTASSIUM REFLEX MAGNESIUM: 3.3 MMOL/L (ref 3.5–5.1)
POTASSIUM SERPL-SCNC: 3.3 MMOL/L (ref 3.5–5.1)
PROTHROMBIN TIME: 14.1 SEC (ref 11.7–14.5)
PROTHROMBIN TIME: 14.3 SEC (ref 11.7–14.5)
PROTHROMBIN TIME: 14.4 SEC (ref 11.7–14.5)
RBC # BLD: 4.81 M/UL (ref 4–5.2)
SODIUM BLD-SCNC: 140 MMOL/L (ref 136–145)
TOTAL PROTEIN: 6.3 G/DL (ref 6.4–8.2)
WBC # BLD: 8.7 K/UL (ref 4–11)

## 2023-02-12 PROCEDURE — 83735 ASSAY OF MAGNESIUM: CPT

## 2023-02-12 PROCEDURE — 6370000000 HC RX 637 (ALT 250 FOR IP): Performed by: INTERNAL MEDICINE

## 2023-02-12 PROCEDURE — 80053 COMPREHEN METABOLIC PANEL: CPT

## 2023-02-12 PROCEDURE — 99233 SBSQ HOSP IP/OBS HIGH 50: CPT | Performed by: INTERNAL MEDICINE

## 2023-02-12 PROCEDURE — 2580000003 HC RX 258: Performed by: STUDENT IN AN ORGANIZED HEALTH CARE EDUCATION/TRAINING PROGRAM

## 2023-02-12 PROCEDURE — 85025 COMPLETE CBC W/AUTO DIFF WBC: CPT

## 2023-02-12 PROCEDURE — 6370000000 HC RX 637 (ALT 250 FOR IP): Performed by: STUDENT IN AN ORGANIZED HEALTH CARE EDUCATION/TRAINING PROGRAM

## 2023-02-12 PROCEDURE — 85610 PROTHROMBIN TIME: CPT

## 2023-02-12 PROCEDURE — 6360000002 HC RX W HCPCS: Performed by: STUDENT IN AN ORGANIZED HEALTH CARE EDUCATION/TRAINING PROGRAM

## 2023-02-12 PROCEDURE — 36415 COLL VENOUS BLD VENIPUNCTURE: CPT

## 2023-02-12 PROCEDURE — 2060000000 HC ICU INTERMEDIATE R&B

## 2023-02-12 RX ADMIN — Medication 10 MG: at 00:10

## 2023-02-12 RX ADMIN — MAGNESIUM SULFATE HEPTAHYDRATE 2000 MG: 40 INJECTION, SOLUTION INTRAVENOUS at 06:33

## 2023-02-12 RX ADMIN — SACUBITRIL AND VALSARTAN 0.5 TABLET: 24; 26 TABLET, FILM COATED ORAL at 20:17

## 2023-02-12 RX ADMIN — LACTULOSE 20 G: 20 SOLUTION ORAL at 20:17

## 2023-02-12 RX ADMIN — LACTULOSE 20 G: 20 SOLUTION ORAL at 09:11

## 2023-02-12 RX ADMIN — SODIUM CHLORIDE 25 ML: 9 INJECTION, SOLUTION INTRAVENOUS at 06:32

## 2023-02-12 RX ADMIN — SACUBITRIL AND VALSARTAN 0.5 TABLET: 24; 26 TABLET, FILM COATED ORAL at 09:10

## 2023-02-12 RX ADMIN — FUROSEMIDE 5 MG/HR: 10 INJECTION INTRAMUSCULAR; INTRAVENOUS at 05:27

## 2023-02-12 RX ADMIN — Medication 10 MG: at 20:17

## 2023-02-12 ASSESSMENT — PAIN SCALES - GENERAL
PAINLEVEL_OUTOF10: 0

## 2023-02-12 ASSESSMENT — PAIN SCALES - WONG BAKER
WONGBAKER_NUMERICALRESPONSE: 0

## 2023-02-12 NOTE — PROGRESS NOTES
4 Eyes Admission Assessment     I agree as the admission nurse that 2 RN's have performed a thorough Head to Toe Skin Assessment on the patient. ALL assessment sites listed below have been assessed on admission. Areas assessed by both nurses:   [x]   Head, Face, and Ears   [x]   Shoulders, Back, and Chest  [x]   Arms, Elbows, and Hands   [x]   Coccyx, Sacrum, and Ischium  [x]   Legs, Feet, and Heels    Scattered bruising  Blanchable redness bilat heels        Does the Patient have Skin Breakdown?   No         Kevin Prevention initiated:  No   Wound Care Orders initiated:  No      [facility] nurse consulted for Pressure Injury (Stage 3,4, Unstageable, DTI, NWPT, and Complex wounds) or Kevin score 18 or lower:  No      Nurse 1 eSignature: Electronically signed by Nasir Huynh RN on 2/12/23 at 6:41 AM EST    **SHARE this note so that the co-signing nurse is able to place an eSignature**    Nurse 2 eSignature: Electronically signed by Erwin Allred RN on 2/12/23 at 6:45 AM EST

## 2023-02-12 NOTE — PROGRESS NOTES
[address] [place] [address]  GI Progress Note          Tien Burgos is a 28 y.o. female patient. 1. Acute pyelonephritis    2. Bilateral pleural effusion    3.  Septicemia ([address]        Admit Date: 2/3/2023    Subjective:       No abd pain N/V    Scheduled Meds:   sacubitril-valsartan  0.5 tablet Oral BID    lactulose  20 g Oral BID    nicotine  1 patch TransDERmal Once    magnesium sulfate  4,000 mg IntraVENous Once    enoxaparin  40 mg SubCUTAneous Daily    melatonin  10 mg Oral Nightly    acetaminophen  650 mg Oral Once    lidocaine 1 % injection  5 mL IntraDERmal Once    sodium chloride flush  5-40 mL IntraVENous 2 times per day    [Held by provider] lurasidone  20 mg Oral Daily    polyethylene glycol  17 g Oral Daily    sodium chloride flush  10 mL IntraVENous 2 times per day    nicotine  1 patch TransDERmal Daily     Objective:       Patient Vitals for the past 24 hrs:   BP Temp Temp src Pulse Resp SpO2 Height Weight   02/12/23 0815 112/82 97.4 °F (36.3 °C) Oral (!) 109 18 100 % -- --   02/12/23 0611 119/85 97.6 °F (36.4 °C) Oral (!) 112 20 97 % 5' 3\" (1.6 m) 154 lb 1.6 oz (69.9 kg)   02/12/23 0400 -- 98.9 °F (37.2 °C) Temporal -- -- -- -- --   02/12/23 0330 -- -- -- (!) 101 14 -- -- --   02/12/23 0300 -- -- -- (!) 103 30 -- -- --   02/12/23 0230 -- -- -- (!) 102 17 -- -- --   02/12/23 0200 -- -- -- (!) 106 29 -- -- --   02/12/23 0130 -- -- -- (!) 110 24 -- -- --   02/12/23 0100 127/87 -- -- (!) 117 23 -- -- --   02/12/23 0030 (!) 130/94 -- -- (!) 117 (!) 32 -- -- --   02/12/23 0000 (!) 134/90 99.9 °F (37.7 °C) Temporal (!) 115 19 -- -- --   02/11/23 2330 (!) 74/64 -- -- (!) 114 (!) 44 -- -- --   02/11/23 2230 -- -- -- (!) 115 23 -- -- --   02/11/23 2200 110/84 -- -- (!) 114 (!) 33 -- -- --   02/11/23 2130 99/73 -- -- (!) 115 23 -- -- --   02/11/23 2100 (!) 111/92 -- -- (!) 122 22 -- -- --   02/11/23 2030 -- -- -- (!) 121 26 -- -- --   02/11/23 2000 112/88 99.7 °F (37.6 °C) Temporal (!) 123 28 -- -- -- 23 1800 109/68 -- -- (!) 108 28 -- -- --   23 1700 (!) 124/90 -- -- (!) 101 (!) 9 -- -- --   23 1600 (!) 131/93 98.6 °F (37 °C) Oral (!) 109 16 97 % -- --   23 1500 116/81 -- -- (!) 102 22 -- -- --   23 1400 106/60 -- -- -- -- -- -- --       Exam:  VITALS:  /82   Pulse (!) 109   Temp 97.4 °F (36.3 °C) (Oral)   Resp 18   Ht 5' 3\" (1.6 m)   Wt 154 lb 1.6 oz (69.9 kg)   SpO2 100%   BMI 27.30 kg/m²   TEMPERATURE:  Current - Temp: 97.4 °F (36.3 °C); Max - Temp  Av.7 °F (37.1 °C)  Min: 97.4 °F (36.3 °C)  Max: 99.9 °F (37.7 °C)    NAD  General appearance: alert, appears stated age, cooperative and no distress  Abdomen: soft, non-tender; bowel sounds normal; no masses,  no organomegaly  AAOx3, No asterixis or encephalopathy  Extremities: No edema. Recent Labs     23  1028 23  0425   WBC 15.0* 8.7   HGB 11.9* 12.6   HCT 38.6 40.4   MCV 84.9 83.9    285     Recent Labs     23  0632 23  1818 23  0425    141 140   K 2.9* 3.8 3.3*  3.3*    97* 95*   CO2 32 34* 34*   PHOS 2.9 2.7 3.3   BUN 10 11 10   CREATININE 0.5* 0.7 0.7     Recent Labs     02/10/23  1107 23  0632 23  0425   * 402* 401*   * 633* 746*   BILIDIR <0.2 <0.2 <0.2   BILITOT 0.4 0.5 0.6   ALKPHOS 93 104 129     No results for input(s): LIPASE, AMYLASE in the last 72 hours. Recent Labs     02/10/23  1107 02/10/23  1802 02/10/23  2347 23  0632 23  0425 23  0426   PROT 3.6*  --   --  4.6* 6.3*  --    INR 1.99*   < > 1.88* 1.36*  --  1.10    < > = values in this interval not displayed. Assessment:       Principal Problem:    Acute pyelonephritis  Active Problems:    Pyelonephritis    Acute systolic congestive heart failure (HCC)    Primary hypertension    Acute hepatitis B    Bipolar affective disorder, current episode manic ([facility]  Resolved Problems:    * No resolved hospital problems.  *  Severe cardiomyopathy    Clinically improved mental status better liver chem improved. Suspect both chronic Hep B and C and ischemia. Could have a strain of hep B not responsive to her hep B s Ab INR much better. Hep B titer is low at present but Hep B e Ag confirms she is infectious, despite high titer s Ab. Continue supportive care, nutrition will be important. Hep C PCR positive also. Autoimmune and HIV negative.  Decrease Lactulose to BID  Check Hepatitis D (ordered)    Liver chem about the same    Recommendations:       Continue supportive care  Will need reassessment of Hep B status and potential for Hep C re treatment as OP  Needs Substance abuse counseling  Needs Hep A vaccine    Mary Harley MD  2/12/2023  9:46 AM

## 2023-02-12 NOTE — PROGRESS NOTES
[facility] Daily Progress Note      Admit Date:  2/3/2023    Subjective:  Ms. Heriberto Bustillos was seen and examined. F/U CHF/cmyop. Restless night.      Objective:   /82   Pulse (!) 109   Temp 97.4 °F (36.3 °C) (Oral)   Resp 18   Ht 5' 3\" (1.6 m)   Wt 154 lb 1.6 oz (69.9 kg)   SpO2 100%   BMI 27.30 kg/m²     Intake/Output Summary (Last 24 hours) at 2/12/2023 0936  Last data filed at 2/12/2023 0910  Gross per 24 hour   Intake 120 ml   Output 600 ml   Net -480 ml       TELEMETRY: Sinus     Physical Exam:  General:  Awake, alert, NAD  Skin:  Warm and dry  Neck:  JVP difficult  Chest:  decreased BS, respiration normal  Cardiovascular:  RRR S1S2  Abdomen:  Soft non tender  Extremities:  tr edema    Medications:    sacubitril-valsartan  0.5 tablet Oral BID    lactulose  20 g Oral BID    nicotine  1 patch TransDERmal Once    magnesium sulfate  4,000 mg IntraVENous Once    enoxaparin  40 mg SubCUTAneous Daily    melatonin  10 mg Oral Nightly    acetaminophen  650 mg Oral Once    lidocaine 1 % injection  5 mL IntraDERmal Once    sodium chloride flush  5-40 mL IntraVENous 2 times per day    [Held by provider] lurasidone  20 mg Oral Daily    polyethylene glycol  17 g Oral Daily    sodium chloride flush  10 mL IntraVENous 2 times per day    nicotine  1 patch TransDERmal Daily      furosemide (LASIX) 1mg/mL infusion 5 mg/hr (02/12/23 0527)    sodium chloride      sodium chloride 25 mL (02/12/23 0632)     sodium chloride flush, sodium chloride, docusate sodium, perflutren lipid microspheres, LORazepam, hydrOXYzine HCl, sodium chloride flush, sodium chloride, potassium chloride, magnesium sulfate, promethazine **OR** ondansetron    Lab Data:  CBC:   Recent Labs     02/09/23  1028 02/12/23  0425   WBC 15.0* 8.7   HGB 11.9* 12.6   HCT 38.6 40.4   MCV 84.9 83.9    285     BMP:   Recent Labs     02/11/23  0632 02/11/23  1818 02/12/23  0425    141 140   K 2.9* 3.8 3.3*  3.3*    97* 95*   CO2 32 34* 34*   PHOS 2.9 2.7 3.3   BUN 10 11 10   CREATININE 0.5* 0.7 0.7     LIVER PROFILE:   Recent Labs     02/10/23  1107 02/11/23  0632 02/12/23  0425   * 402* 401*   * 633* 746*   BILIDIR <0.2 <0.2 <0.2   BILITOT 0.4 0.5 0.6   ALKPHOS 93 104 129     PT/INR:   Recent Labs     02/10/23  2347 02/11/23  0632 02/12/23  0426   PROTIME 21.6* 16.7* 14.1   INR 1.88* 1.36* 1.10     APTT: No results for input(s): APTT in the last 72 hours. BNP:  No results for input(s): BNP in the last 72 hours. IMAGING:     Assessment:  Patient Active Problem List    Diagnosis Date Noted    Acute systolic congestive heart failure ([facility] 02/09/2023    Primary hypertension 02/09/2023    Acute hepatitis B 02/09/2023    Bipolar affective disorder, current episode manic ([facility] 02/09/2023    Pyelonephritis 02/07/2023    Acute pyelonephritis 02/04/2023    Normal pregnancy in multigravida in third trimester 08/18/2017       Plan:  Appears to be diuresing. Edema improving. On RA. Cr remains stable. Add low dose entresto. Continue diuresis.         Core Measures:  Discharge instructions:   LVEF documented:   ACEI for LV dysfunction:   Smoking Cessation:    Marva Reese MD, MD 2/12/2023 9:36 AM

## 2023-02-12 NOTE — PROGRESS NOTES
Hospitalist Progress Note      Name:  Aquilino Archibald /Age/Sex: 1995  (28 y.o. female)   MRN & CSN:  [id number] & [id number] Admission Date/Time: 2/3/2023  6:45 PM   Location:  [address] [place] [address] PCP: No primary care provider on file. Hospital Day: 10    Assessment and Plan:   Ms. Aquilino Archibald is a 74-year-old  female with medical history significant for bipolar affective disorder, hepatitis C infection in 2020 treated with ledipasvir-sofosbuvir, essential hypertension, asthma, and h/o crystal meth use who presented to the ED with a one-week history of chills, sinus congestion, cough, dyspnea, abdominal pain, and low back and was admitted on  for acute complicated cystitis with bilateral pleural effusions for which ceftriaxone and IVF were initiated. Methamphetamine was found on UDS. Patient reports that she hasn't used crystal meth within the last year; however, reports recent use of Adderall for ADHD. Biventricular failure acute  Cardiology following, etiology unclear possibly stress-induced/polysubstance abuse  On iv lasix drip  Monitor renal function and serum electrolytes   Appears to be diuresing  Cardiology added low dose entersto     Hypokalemia : serum K today 3.3  On replacement protocol     Elevated LFTs probably related to passive liver congestion secondary to right-sided heart failure, Hep BsAg positive, GI following   Will need reassessment of Hep B status and potential for Hep C re treatment as OP     H/o BIpolar disorder      Rt sided pleural effusion     On nicotine patch for tobacco abuse     Diet ADULT DIET; Regular; 3 carb choices (45 gm/meal); Low Fat/Low Chol/High Fiber/2 gm Na;  Low Sodium (2 gm); 1200 ml   DVT Prophylaxis [x] Lovenox, []  Heparin, [] SCDs, [] Ambulation   GI Prophylaxis [] PPI,  [] H2 Blocker,  [] Carafate,  [] Diet/Tube Feeds   Code Status Full Code   Disposition Patient requires continued admission due to    MDM [] Low, [] Moderate,[]  High  Patient's risk as above due to      History of Present Illness:   Was seen and examined  Denies CP or SOB   Still on iv lasix drip   Objective: Intake/Output Summary (Last 24 hours) at 2/12/2023 1011  Last data filed at 2/12/2023 0910  Gross per 24 hour   Intake 120 ml   Output 600 ml   Net -480 ml        Vitals:   Vitals:    02/12/23 0815   BP: 112/82   Pulse: (!) 109   Resp: 18   Temp: 97.4 °F (36.3 °C)   SpO2: 100%     Physical Exam:   GEN Awake.  Alert , not in respiratory distress, not in pain  HEENT: PEERLA, , supple neck,   Chest: air entry equal bilaterally, no wheezing or crepitation  Heart: S1 and S2 heard, no murmur, no gallop or rub, regular rate  Abdomen: soft, ND , Nt, +BS  Extremities: no cyanosis, tenderness or erythema, peripheral pulses audible  Neurology: alert, oriented  , able to move 4 limbs    Medications:   Medications:    sacubitril-valsartan  0.5 tablet Oral BID    lactulose  20 g Oral BID    nicotine  1 patch TransDERmal Once    magnesium sulfate  4,000 mg IntraVENous Once    enoxaparin  40 mg SubCUTAneous Daily    melatonin  10 mg Oral Nightly    acetaminophen  650 mg Oral Once    lidocaine 1 % injection  5 mL IntraDERmal Once    sodium chloride flush  5-40 mL IntraVENous 2 times per day    [Held by provider] lurasidone  20 mg Oral Daily    polyethylene glycol  17 g Oral Daily    sodium chloride flush  10 mL IntraVENous 2 times per day    nicotine  1 patch TransDERmal Daily      Infusions:    furosemide (LASIX) 1mg/mL infusion 5 mg/hr (02/12/23 0527)    sodium chloride      sodium chloride 25 mL (02/12/23 0632)     PRN Meds: sodium chloride flush, 5-40 mL, PRN  sodium chloride, 25 mL, PRN  docusate sodium, 100 mg, BID PRN  perflutren lipid microspheres, 1.5 mL, ONCE PRN  LORazepam, 1 mg, Q6H PRN  hydrOXYzine HCl, 10 mg, TID PRN  sodium chloride flush, 10 mL, PRN  sodium chloride, , PRN  potassium chloride, 10 mEq, PRN  magnesium sulfate, 2,000 mg, PRN  promethazine, 12.5 mg, Q6H PRN   Or  ondansetron, 4 mg, Q6H PRN        Electronically signed by Ryan Ignacio MD on 2/12/2023 at 10:11 AM

## 2023-02-12 NOTE — PLAN OF CARE
Problem: Discharge Planning  Goal: Discharge to home or other facility with appropriate resources  Outcome: Progressing  Flowsheets (Taken 2/12/2023 3044)  Discharge to home or other facility with appropriate resources:   Identify barriers to discharge with patient and caregiver   Arrange for needed discharge resources and transportation as appropriate   Identify discharge learning needs (meds, wound care, etc)     Problem: Safety - Adult  Goal: Free from fall injury  Outcome: Progressing     Problem: ABCDS Injury Assessment  Goal: Absence of physical injury  Outcome: Progressing

## 2023-02-12 NOTE — PLAN OF CARE
Problem: Discharge Planning  Goal: Discharge to home or other facility with appropriate resources  2/12/2023 1334 by Saadia Hansen RN  Outcome: Progressing  Flowsheets  Taken 2/12/2023 1334  Discharge to home or other facility with appropriate resources:   Identify barriers to discharge with patient and caregiver   Identify discharge learning needs (meds, wound care, etc)  Taken 2/12/2023 0815  Discharge to home or other facility with appropriate resources: Identify barriers to discharge with patient and caregiver     Problem: Safety - Adult  Goal: Free from fall injury  2/12/2023 1334 by Saadia Hansen RN  Outcome: Progressing  Flowsheets (Taken 2/12/2023 1334)  Free From Fall Injury:   Instruct family/caregiver on patient safety   Based on caregiver fall risk screen, instruct family/caregiver to ask for assistance with transferring infant if caregiver noted to have fall risk factors     Problem: ABCDS Injury Assessment  Goal: Absence of physical injury  2/12/2023 1334 by Saadia Hansen RN  Outcome: Progressing  Flowsheets (Taken 2/4/2023 0549 by Albin Crigler, RN)  Absence of Physical Injury: Implement safety measures based on patient assessment     Problem: Pain  Goal: Verbalizes/displays adequate comfort level or baseline comfort level  2/12/2023 1334 by Saadia Hansen RN  Outcome: Progressing  Flowsheets (Taken 2/12/2023 1334)  Verbalizes/displays adequate comfort level or baseline comfort level:   Encourage patient to monitor pain and request assistance   Administer analgesics based on type and severity of pain and evaluate response   Assess pain using appropriate pain scale     Problem: Chronic Conditions and Co-morbidities  Goal: Patient's chronic conditions and co-morbidity symptoms are monitored and maintained or improved  2/12/2023 1334 by Saadia Hansen RN  Outcome: Progressing  Flowsheets (Taken 2/12/2023 0815)  Care Plan - Patient's Chronic Conditions and Co-Morbidity Symptoms are Monitored and Maintained or Improved: Monitor and assess patient's chronic conditions and comorbid symptoms for stability, deterioration, or improvement     Problem: Respiratory - Adult  Goal: Achieves optimal ventilation and oxygenation  2/12/2023 1334 by Omega Abad RN  Outcome: Progressing  Flowsheets (Taken 2/12/2023 1334)  Achieves optimal ventilation and oxygenation:   Assess for changes in respiratory status   Position to facilitate oxygenation and minimize respiratory effort   Assess for changes in mentation and behavior   Initiate smoking cessation protocol as indicated     Problem: Cardiovascular - Adult  Goal: Maintains optimal cardiac output and hemodynamic stability  2/12/2023 1334 by Omega Abad RN  Outcome: Progressing  Flowsheets (Taken 2/12/2023 1334)  Maintains optimal cardiac output and hemodynamic stability:   Monitor blood pressure and heart rate   Assess for signs of decreased cardiac output   Monitor urine output and notify Licensed Independent Practitioner for values outside of normal range     Problem: Metabolic/Fluid and Electrolytes - Adult  Goal: Electrolytes maintained within normal limits  2/12/2023 1334 by Omega Abad RN  Outcome: Progressing  Flowsheets  Taken 2/12/2023 1334  Electrolytes maintained within normal limits:   Monitor labs and assess patient for signs and symptoms of electrolyte imbalances   Monitor response to electrolyte replacements, including repeat lab results as appropriate   Administer electrolyte replacement as ordered  Taken 2/12/2023 0815  Electrolytes maintained within normal limits:   Monitor labs and assess patient for signs and symptoms of electrolyte imbalances   Administer electrolyte replacement as ordered   Monitor response to electrolyte replacements, including repeat lab results as appropriate

## 2023-02-13 LAB
A/G RATIO: 1 (ref 1.1–2.2)
ALBUMIN SERPL-MCNC: 3.4 G/DL (ref 3.4–5)
ALP BLD-CCNC: 137 U/L (ref 40–129)
ALT SERPL-CCNC: 644 U/L (ref 10–40)
ANION GAP SERPL CALCULATED.3IONS-SCNC: 13 MMOL/L (ref 3–16)
AST SERPL-CCNC: 273 U/L (ref 15–37)
BASOPHILS ABSOLUTE: 0 K/UL (ref 0–0.2)
BASOPHILS RELATIVE PERCENT: 0.4 %
BILIRUB SERPL-MCNC: 0.7 MG/DL (ref 0–1)
BILIRUBIN DIRECT: <0.2 MG/DL (ref 0–0.3)
BILIRUBIN, INDIRECT: ABNORMAL MG/DL (ref 0–1)
BUN BLDV-MCNC: 10 MG/DL (ref 7–20)
CALCIUM SERPL-MCNC: 9 MG/DL (ref 8.3–10.6)
CHLORIDE BLD-SCNC: 95 MMOL/L (ref 99–110)
CO2: 33 MMOL/L (ref 21–32)
CREAT SERPL-MCNC: 0.7 MG/DL (ref 0.6–1.1)
EKG ATRIAL RATE: 101 BPM
EKG DIAGNOSIS: NORMAL
EKG P AXIS: 56 DEGREES
EKG P-R INTERVAL: 116 MS
EKG Q-T INTERVAL: 398 MS
EKG QRS DURATION: 86 MS
EKG QTC CALCULATION (BAZETT): 516 MS
EKG R AXIS: 96 DEGREES
EKG T AXIS: 71 DEGREES
EKG VENTRICULAR RATE: 101 BPM
EOSINOPHILS ABSOLUTE: 0.3 K/UL (ref 0–0.6)
EOSINOPHILS RELATIVE PERCENT: 3.3 %
GFR SERPL CREATININE-BSD FRML MDRD: >60 ML/MIN/{1.73_M2}
GLUCOSE BLD-MCNC: 152 MG/DL (ref 70–99)
GLUCOSE BLD-MCNC: 98 MG/DL (ref 70–99)
HCT VFR BLD CALC: 44.1 % (ref 36–48)
HEMOGLOBIN: 13.8 G/DL (ref 12–16)
INR BLD: 1.14 (ref 0.87–1.14)
LYMPHOCYTES ABSOLUTE: 4 K/UL (ref 1–5.1)
LYMPHOCYTES RELATIVE PERCENT: 46.7 %
MAGNESIUM: 2 MG/DL (ref 1.8–2.4)
MCH RBC QN AUTO: 26.3 PG (ref 26–34)
MCHC RBC AUTO-ENTMCNC: 31.4 G/DL (ref 31–36)
MCV RBC AUTO: 83.7 FL (ref 80–100)
MONOCYTES ABSOLUTE: 0.6 K/UL (ref 0–1.3)
MONOCYTES RELATIVE PERCENT: 6.6 %
NEUTROPHILS ABSOLUTE: 3.6 K/UL (ref 1.7–7.7)
NEUTROPHILS RELATIVE PERCENT: 43 %
PDW BLD-RTO: 18.3 % (ref 12.4–15.4)
PERFORMED ON: NORMAL
PHOSPHORUS: 4.1 MG/DL (ref 2.5–4.9)
PLATELET # BLD: 342 K/UL (ref 135–450)
PMV BLD AUTO: 8.7 FL (ref 5–10.5)
POTASSIUM REFLEX MAGNESIUM: 4.1 MMOL/L (ref 3.5–5.1)
POTASSIUM SERPL-SCNC: 4.1 MMOL/L (ref 3.5–5.1)
PROTHROMBIN TIME: 14.6 SEC (ref 11.7–14.5)
RBC # BLD: 5.26 M/UL (ref 4–5.2)
SODIUM BLD-SCNC: 141 MMOL/L (ref 136–145)
TOTAL PROTEIN: 6.7 G/DL (ref 6.4–8.2)
WBC # BLD: 8.5 K/UL (ref 4–11)

## 2023-02-13 PROCEDURE — 6370000000 HC RX 637 (ALT 250 FOR IP): Performed by: INTERNAL MEDICINE

## 2023-02-13 PROCEDURE — 6360000002 HC RX W HCPCS: Performed by: STUDENT IN AN ORGANIZED HEALTH CARE EDUCATION/TRAINING PROGRAM

## 2023-02-13 PROCEDURE — 85025 COMPLETE CBC W/AUTO DIFF WBC: CPT

## 2023-02-13 PROCEDURE — 2580000003 HC RX 258: Performed by: STUDENT IN AN ORGANIZED HEALTH CARE EDUCATION/TRAINING PROGRAM

## 2023-02-13 PROCEDURE — 85610 PROTHROMBIN TIME: CPT

## 2023-02-13 PROCEDURE — 93010 ELECTROCARDIOGRAM REPORT: CPT | Performed by: INTERNAL MEDICINE

## 2023-02-13 PROCEDURE — 6370000000 HC RX 637 (ALT 250 FOR IP): Performed by: STUDENT IN AN ORGANIZED HEALTH CARE EDUCATION/TRAINING PROGRAM

## 2023-02-13 PROCEDURE — 2060000000 HC ICU INTERMEDIATE R&B

## 2023-02-13 PROCEDURE — 83735 ASSAY OF MAGNESIUM: CPT

## 2023-02-13 PROCEDURE — 80053 COMPREHEN METABOLIC PANEL: CPT

## 2023-02-13 PROCEDURE — 93005 ELECTROCARDIOGRAM TRACING: CPT | Performed by: INTERNAL MEDICINE

## 2023-02-13 PROCEDURE — 99232 SBSQ HOSP IP/OBS MODERATE 35: CPT | Performed by: INTERNAL MEDICINE

## 2023-02-13 RX ADMIN — FUROSEMIDE 5 MG/HR: 10 INJECTION INTRAMUSCULAR; INTRAVENOUS at 20:54

## 2023-02-13 RX ADMIN — Medication 10 MG: at 21:08

## 2023-02-13 RX ADMIN — SODIUM CHLORIDE, PRESERVATIVE FREE 10 ML: 5 INJECTION INTRAVENOUS at 09:49

## 2023-02-13 RX ADMIN — SODIUM CHLORIDE, PRESERVATIVE FREE 10 ML: 5 INJECTION INTRAVENOUS at 09:48

## 2023-02-13 RX ADMIN — SODIUM CHLORIDE, PRESERVATIVE FREE 10 ML: 5 INJECTION INTRAVENOUS at 21:11

## 2023-02-13 RX ADMIN — FUROSEMIDE 5 MG/HR: 10 INJECTION INTRAMUSCULAR; INTRAVENOUS at 01:44

## 2023-02-13 RX ADMIN — SACUBITRIL AND VALSARTAN 0.5 TABLET: 24; 26 TABLET, FILM COATED ORAL at 21:08

## 2023-02-13 RX ADMIN — SACUBITRIL AND VALSARTAN 0.5 TABLET: 24; 26 TABLET, FILM COATED ORAL at 09:52

## 2023-02-13 RX ADMIN — LACTULOSE 20 G: 20 SOLUTION ORAL at 09:49

## 2023-02-13 RX ADMIN — SODIUM CHLORIDE, PRESERVATIVE FREE 10 ML: 5 INJECTION INTRAVENOUS at 21:08

## 2023-02-13 RX ADMIN — LACTULOSE 20 G: 20 SOLUTION ORAL at 21:08

## 2023-02-13 NOTE — PROGRESS NOTES
[facility]  Cardiology Inpatient Consult Service  Daily Progress Note        Admit Date:  2/3/2023    Referring Physician: Ayad Tilley MD    Reason for Consultation/Chief Complaint: Acute biventricular systolic failure    Subjective: Interval history:  JEANNE    Medications:   sacubitril-valsartan  0.5 tablet Oral BID    lactulose  20 g Oral BID    magnesium sulfate  4,000 mg IntraVENous Once    enoxaparin  40 mg SubCUTAneous Daily    melatonin  10 mg Oral Nightly    acetaminophen  650 mg Oral Once    lidocaine 1 % injection  5 mL IntraDERmal Once    sodium chloride flush  5-40 mL IntraVENous 2 times per day    [Held by provider] lurasidone  20 mg Oral Daily    polyethylene glycol  17 g Oral Daily    sodium chloride flush  10 mL IntraVENous 2 times per day    nicotine  1 patch TransDERmal Daily       IV drips:   furosemide (LASIX) 1mg/mL infusion 5 mg/hr (02/13/23 0144)    sodium chloride      sodium chloride 25 mL (02/12/23 0632)       PRN:  sodium chloride flush, sodium chloride, docusate sodium, perflutren lipid microspheres, LORazepam, hydrOXYzine HCl, sodium chloride flush, sodium chloride, potassium chloride, magnesium sulfate, promethazine **OR** ondansetron      Objective:     Vitals:    02/13/23 0415 02/13/23 0555 02/13/23 0815 02/13/23 0942   BP: 105/72  97/70 97/70   Pulse: (!) 101 98 99 99   Resp: 16  16 16   Temp: 98 °F (36.7 °C)  98.1 °F (36.7 °C) 98.1 °F (36.7 °C)   TempSrc: Axillary  Oral Oral   SpO2: 98%      Weight:       Height:           Intake/Output Summary (Last 24 hours) at 2/13/2023 1142  Last data filed at 2/13/2023 1000  Gross per 24 hour   Intake 1086 ml   Output 4500 ml   Net -3414 ml     I/O last 3 completed shifts: In: 1 [P.O.:600;  I.V.:246]  Out: 5000 [Urine:5000]  Wt Readings from Last 3 Encounters:   02/13/23 138 lb 7.2 oz (62.8 kg)   08/10/21 144 lb 4 oz (65.4 kg)   08/18/17 155 lb (70.3 kg)       Admit Wt: Weight: 130 lb (59 kg)   Todays Wt: Weight: 138 lb 7.2 oz (62.8 kg)    TELEMETRY: Personally interpreted Sinus     Physical Exam:     General:  Awake, alert, NAD  Skin:  Warm and dry  Neck:  -JVP  Chest:  Clear to auscultation, respiration normal  Cardiovascular:  RRR S1S2  Abdomen:  Soft -  Extremities:  - edema      Objective:  Vital signs: (most recent): Blood pressure 97/70, pulse 99, temperature 98.1 °F (36.7 °C), temperature source Oral, resp. rate 16, height 5' 3\" (1.6 m), weight 138 lb 7.2 oz (62.8 kg), SpO2 98 %, unknown if currently breastfeeding. Labs:   Recent Labs     02/11/23  1818 02/12/23  0425 02/13/23  0330    140 141   K 3.8 3.3*  3.3* 4.1  4.1   BUN 11 10 10   CREATININE 0.7 0.7 0.7   CL 97* 95* 95*   CO2 34* 34* 33*   GLUCOSE 116* 92 152*   CALCIUM 7.8* 8.5 9.0   MG 1.50* 1.60* 2.00     Recent Labs     02/12/23  0425 02/13/23  0330   WBC 8.7 8.5   HGB 12.6 13.8   HCT 40.4 44.1    342   MCV 83.9 83.7     No results for input(s): CHOLTOT, TRIG, HDL, CHOLHDL, LDL in the last 72 hours. Invalid input(s): [employer], [id number]  Recent Labs     02/12/23  0940 02/12/23  1850 02/13/23  0330   INR 1.11 1.13 1.14     No results for input(s): CKTOTAL, CKMB, CKMBINDEX, TROPONINI in the last 72 hours. No results for input(s): BNP in the last 72 hours. No results for input(s): NTPROBNP in the last 72 hours. No results for input(s): TSH in the last 72 hours. Imaging:   I personally reviewed imaging studies including CXR, Stress test, TTE/KYM. Assessment & Plan:     Acute biventricular systolic failure  -Transition to oral Lasix 40 mg p.o. daily  -Continue low-dose Entresto. -Given borderline BP unable to add beta-blocker/Aldactone  -Please add Jardiance  -Defer ICD at this point  -will need ischemic work-up, please obtain CTA if heart rate is an issue, plan for calcium score first.    Thank you for allowing to us to participate in the care of Tien Burgos. Please call our service with questions.     All questions and concerns were addressed to the patient/family. Alternatives to my treatment were discussed. The note was completed using EMR. Every effort was made to ensure accuracy; however, inadvertent computerized transcription errors may be present. I have personally reviewed the reports and images of labs, radiological studies, cardiac studies including ECG's and telemetry, current and old medical records. Devang Norris MD, [facility] [address]    2/13/2023 11:42 AM

## 2023-02-13 NOTE — CARE COORDINATION
CM continues to follow for DC planning and needs. Patient continues on Lasix gtt, had low BP this afternoon. Patient noted to be homeless, CM will discuss shelter options with patient prior to DC. Community resources have been provided for OP drug treatment and IP drug treatment options.     Thank you,  Jones Cox RN,   4th Floor Progressive Care Unit  [phone]

## 2023-02-13 NOTE — PROGRESS NOTES
Progress Note        Patient Caitlin Ryan  MRN: [id number]  YOB: 1995 Age: 28 y.o. Sex: female  Room: [address] [place] [address]       Admitting Physician: Eric Oscar DO   Date of Admission: 2/3/2023  6:45 PM   Primary Care Physician: No primary care provider on file. Subjective:  Caitlin Ryan was seen and examined. We are following the patient for abnormal LFTs with acute hep B and hep C. She previously had treatment for hep C with Jorge Rubio about 2 years back. She has history of IV drug use, last time she used was in December. Probably she has reinfection with hep C and also has acute hep B.    ROS:  Constitutional: Denies fever, no hange in appetite  Respiratory: Denies cough or shortness of breath  Cardiovascular: Denies chest pain or edema    Objective:  Vital Signs:   Vitals:    02/13/23 0942   BP: 97/70   Pulse: 99   Resp: 16   Temp: 98.1 °F (36.7 °C)   SpO2:          Physical Exam:  Constitutional: Alert and oriented x 4. No acute distress. Respiratory: Respirations nonlabored, no crepitus  GI: Abdomen nondistended, soft, and nontender. Neurological: No focal deficits noted. No asterixis.     Intake/Output:    Intake/Output Summary (Last 24 hours) at 2/13/2023 1215  Last data filed at 2/13/2023 1000  Gross per 24 hour   Intake 1086 ml   Output 4500 ml   Net -3414 ml        Current Medications:  Current Facility-Administered Medications   Medication Dose Route Frequency Provider Last Rate Last Admin    sacubitril-valsartan (ENTRESTO) 24-26 MG per tablet 0.5 tablet  0.5 tablet Oral BID Sofia Cuevas MD   0.5 tablet at 02/13/23 0952    lactulose (CHRONULAC) 10 GM/15ML solution 20 g  20 g Oral BID Paty Reddy MD   20 g at 02/13/23 0949    magnesium sulfate 4000 mg in 100 mL IVPB premix  4,000 mg IntraVENous Once Gentry Aceves MD        enoxaparin (LOVENOX) injection 40 mg  40 mg SubCUTAneous Daily Jairon Mathew MD        melatonin disintegrating tablet 10 mg  10 mg Oral Nightly Fito Gonzalez MD   10 mg at 02/12/23 2017    acetaminophen (TYLENOL) tablet 650 mg  650 mg Oral Once Fito Gonzalez MD        furosemide (LASIX) 100 mg in sodium chloride 0.9 % 100 mL infusion  5 mg/hr IntraVENous Continuous Shaquille Lopez MD 5 mL/hr at 02/13/23 0144 5 mg/hr at 02/13/23 0144    lidocaine PF 1 % injection 5 mL  5 mL IntraDERmal Once Shaquille Lopez MD        sodium chloride flush 0.9 % injection 5-40 mL  5-40 mL IntraVENous 2 times per day Shaquille Lopez MD   10 mL at 02/13/23 0948    sodium chloride flush 0.9 % injection 5-40 mL  5-40 mL IntraVENous PRN Shaquille Lopez MD        0.9 % sodium chloride infusion  25 mL IntraVENous PRN Shaquille Lopez MD        [Held by provider] lurasidone (LATUDA) tablet 20 mg  20 mg Oral Daily Shaquille Lopez MD   20 mg at 02/08/23 1147    docusate sodium (COLACE) capsule 100 mg  100 mg Oral BID PRN Shaquille Lopez MD        perflutren lipid microspheres (DEFINITY) injection 1.5 mL  1.5 mL IntraVENous ONCE PRN Shaquille Lopez MD        LORazepam (ATIVAN) injection 1 mg  1 mg IntraVENous Q6H PRN Shaquille Lopez MD   1 mg at 02/09/23 2039    hydrOXYzine HCl (ATARAX) tablet 10 mg  10 mg Oral TID PRN Shaquille Lopez MD   10 mg at 02/08/23 0016    polyethylene glycol (GLYCOLAX) packet 17 g  17 g Oral Daily Shaquille Lopez MD   17 g at 02/05/23 2014    sodium chloride flush 0.9 % injection 10 mL  10 mL IntraVENous 2 times per day Shaquille Lopez MD   10 mL at 02/13/23 0949    sodium chloride flush 0.9 % injection 10 mL  10 mL IntraVENous PRN Shaquille Lopez MD        0.9 % sodium chloride infusion   IntraVENous PRN Shaquille Lopez MD 10 mL/hr at 02/12/23 0632 25 mL at 02/12/23 3841    potassium chloride 10 mEq/100 mL IVPB (Peripheral Line)  10 mEq IntraVENous PRN Shaquille Lopez MD        magnesium sulfate 2000 mg in 50 mL IVPB premix  2,000 mg IntraVENous PRN Shaquille Lopez MD   Stopped at 02/12/23 0911    promethazine (PHENERGAN) tablet 12.5 mg  12.5 mg Oral Q6H PRN [place] Listen, MD        Or    ondansetron (ZOFRAN) injection 4 mg  4 mg IntraVENous Q6H PRN Ashley Jonas MD   4 mg at 02/07/23 2335    nicotine (NICODERM CQ) 21 MG/24HR 1 patch  1 patch TransDERmal Daily Ashley Jonas MD   1 patch at 02/13/23 0950         Recent Imaging:   CT CHEST ABDOMEN PELVIS W CONTRAST Additional Contrast? None  Narrative: CT CHEST, ABDOMEN AND PELVIS:    INDICATION:  sepsis, evaluate for source of infection    TECHNIQUE: Spiral CT images of the chest, abdomen and pelvis were obtained following the intravenous and oral administration of contrast. Patient received a total of 75 mL of Isovue-370 contrast. Up-to-date CT equipment and radiation dose reduction   techniques were employed. COMPARISON: CT scan of the abdomen and pelvis dated 2/3/2023. CHEST: There is some edema identified within the fat planes of the neck. There is subcutaneous edema identified circumferentially about the thorax. There is some edema identified within the superior mediastinum. There is a moderate-sized pericardial   effusion. Thoracic aorta is without aneurysm or dissection. There is some enlargement of the left ventricle, right ventricle and right atrium. Correlate for any cardiomyopathy. No axillary, mediastinal or hilar lymphadenopathy is identified. There are multiple collateral vessels identified about the left scapula and left lower neck. The left axillary vein, left subclavian vein, left innominate vein and superior vena cava are patent. There is a moderate to large layering right-sided pleural effusion. This results in some compressive atelectasis within the right lower lobe. There is a small layering left-sided pleural effusion. There are some linear atelectatic changes identified with   the right middle lobe and lingula. There is respiratory motion artifact. No pneumonic infiltrate is identified. No osteolytic or osteoblastic lesions are identified. Impression: 1.  There is edema identified within the fat planes of the neck as well as within the subcutaneous fat about the thorax. There is also edema within the superior mediastinum. Correlate for anasarca-third spacing. 2. Large layering right-sided pleural effusion is increased from the prior study. There is a small layering left pleural effusion which is mildly increased from the prior study. 3. There is some compressive atelectasis within dependent portion of right lower lobe. Linear atelectasis is present with the right middle lobe and lingula. 4. There are multiple venous collaterals identified about the left shoulder girdle. This is nonspecific. The SVC, left innominate vein, left subclavian vein and left axillary vein are patent. 5. There is some enlargement of the left ventricle, right ventricle and right atrium. Correlate for cardiomyopathy. This could be better evaluated with echocardiogram.    ABDOMEN AND PELVIS: There is subcutaneous edema identified about the abdomen and pelvis. Note should be made that the inferior pelvis was not included within the field-of-view. There is a small amount of free fluid identified with the right paracolic gutter, Morison's pouch as well as the pelvic cul-de-sac. Liver attenuation is decreased. There is some gallbladder wall edema. Pancreas, spleen and adrenal glands are normal. Renal size and enhancement is normal.    No bowel dilatation or bowel wall thickening is identified. Note should be made that some small bowel loops as well as the cecum were not entirely included within the field-of-view due to incomplete imaging through the pelvis. No adnexal mass is identified. No lymphadenopathy is identified. IMPRESSION:    1. Subcutaneous edema is identified about the abdomen and pelvis. This can be seen with anasarca-third spacing. 2. There is gallbladder wall edema. This is nonspecific and may be related to volume overload. Cholecystitis cannot entirely be excluded. 3. Liver attenuation is decreased. This may reflect hepatic steatosis or hepatitis. Correlate with liver function tests. 4. Small amount of free fluid is identified within the abdomen and pelvis. CT HEAD WO CONTRAST  Narrative: HEAD CT SCAN WITHOUT CONTRAST      HISTORY: mental status change    COMPARISON:  None. TECHNIQUE: Noncontrast CT images of the head were obtained. Images were reformatted in the coronal and sagittal planes. Up-to-date CT equipment and radiation dose reduction techniques were employed. FINDINGS: The ventricles, sulci and cisterns are within normal limits for size and configuration. No intracranial hemorrhage is identified. No mass or mass-effect is identified. The gray-white differentiation is intact. There is no CT evidence of acute   ischemia. There is a 15 mm mucous retention cyst present within the right maxillary sinus. . No skull fracture is identified. Impression: 1. No acute intracranial process. Labs:   Recent Labs     02/10/23  1802 02/11/23  0632 02/11/23  1818 02/12/23  0425 02/13/23  0330   HGB  --   --   --  12.6 13.8   WBC  --   --   --  8.7 8.5   PROT  --  4.6*  --  6.3* 6.7   LABALBU 2.4* 2.3* 2.8* 3.3* 3.4   ALKPHOS  --  104  --  129 137*   ALT  --  633*  --  746* 644*   AST  --  402*  --  401* 273*   BILITOT  --  0.5  --  0.6 0.7   BILIDIR  --  <0.2  --  <0.2 <0.2   IBILI  --  see below  --  see below see below          Assessment:    27 y/f with abnormal LFTs with acute hep B and hep C. She previously had treatment for hep C with Tracy Arzola about 2 years back. She has history of IV drug use, last time she used was in December. Probably she has reinfection with hep C and also has acute hep B.   Will need to check HBV DNA  levels again in 3 months to confirm regarding her chronic hepatitis B status before considering Rx      Plan:  Continue with symptomatic and supportive care  Needs substance abuse counseling  Hepatitis A vaccination  Follow-up as outpatient to discuss treatment for hepatitis C/hep B  Please call us back if any questions or concerns      Gregorio Alicea MD    [place]    [phone].  Also available via [employer]

## 2023-02-13 NOTE — FLOWSHEET NOTE
Kim Correia MD for myself in regards to patient episode. I was assisting the patient to the bathroom, after urinating she was standing up to wipe and dropped toilet tissue on the floor. Patient bent over to get the tissue and put it in the trash can. After she did that she said I did that too fast and continued to wash her hands. After washing her hands patient again states she feels really dizzy at that time I got behind the patient and lowered her to the floor and pulled the bathroom emergency light for assistance. We were able to walk the patient back to the bed. FSBS obtained see results. BP was 88/52.

## 2023-02-13 NOTE — PLAN OF CARE
Problem: Discharge Planning  Goal: Discharge to home or other facility with appropriate resources  Outcome: Progressing  Flowsheets (Taken 2/12/2023 1935)  Discharge to home or other facility with appropriate resources:   Identify barriers to discharge with patient and caregiver   Arrange for needed discharge resources and transportation as appropriate   Identify discharge learning needs (meds, wound care, etc)     Problem: Safety - Adult  Goal: Free from fall injury  Outcome: Progressing  Flowsheets (Taken 2/12/2023 1935)  Free From Fall Injury: Instruct family/caregiver on patient safety     Problem: ABCDS Injury Assessment  Goal: Absence of physical injury  Outcome: Progressing  Flowsheets (Taken 2/12/2023 1935)  Absence of Physical Injury: Implement safety measures based on patient assessment     Problem: Chronic Conditions and Co-morbidities  Goal: Patient's chronic conditions and co-morbidity symptoms are monitored and maintained or improved  Outcome: Progressing  Flowsheets (Taken 2/12/2023 1935)  Care Plan - Patient's Chronic Conditions and Co-Morbidity Symptoms are Monitored and Maintained or Improved:   Monitor and assess patient's chronic conditions and comorbid symptoms for stability, deterioration, or improvement   Collaborate with multidisciplinary team to address chronic and comorbid conditions and prevent exacerbation or deterioration   Update acute care plan with appropriate goals if chronic or comorbid symptoms are exacerbated and prevent overall improvement and discharge     Problem: Cardiovascular - Adult  Goal: Maintains optimal cardiac output and hemodynamic stability  Outcome: Progressing  Flowsheets (Taken 2/12/2023 1935)  Maintains optimal cardiac output and hemodynamic stability:   Monitor blood pressure and heart rate   Monitor urine output and notify Licensed Independent Practitioner for values outside of normal range

## 2023-02-13 NOTE — PROGRESS NOTES
Hospitalist Progress Note      PCP: No primary care provider on file. Date of Admission: 2/3/2023    Chief Complaint: Abdominal pain, nausea and vomiting      Subjective: Chart reviewed. Abdominal pain, nausea and vomiting have resolved. She is upset and do not like the carb restricted diet. She wants to eat regular diet. Still has some swelling in lower extremities but shortness of breath is much better. Medications:  Reviewed    Infusion Medications    furosemide (LASIX) 1mg/mL infusion 5 mg/hr (02/13/23 0144)    sodium chloride      sodium chloride 25 mL (02/12/23 1202)     Scheduled Medications    sacubitril-valsartan  0.5 tablet Oral BID    lactulose  20 g Oral BID    magnesium sulfate  4,000 mg IntraVENous Once    enoxaparin  40 mg SubCUTAneous Daily    melatonin  10 mg Oral Nightly    acetaminophen  650 mg Oral Once    lidocaine 1 % injection  5 mL IntraDERmal Once    sodium chloride flush  5-40 mL IntraVENous 2 times per day    [Held by provider] lurasidone  20 mg Oral Daily    polyethylene glycol  17 g Oral Daily    sodium chloride flush  10 mL IntraVENous 2 times per day    nicotine  1 patch TransDERmal Daily     PRN Meds: sodium chloride flush, sodium chloride, docusate sodium, perflutren lipid microspheres, LORazepam, hydrOXYzine HCl, sodium chloride flush, sodium chloride, potassium chloride, magnesium sulfate, promethazine **OR** ondansetron      Intake/Output Summary (Last 24 hours) at 2/13/2023 1223  Last data filed at 2/13/2023 1000  Gross per 24 hour   Intake 1086 ml   Output 4500 ml   Net -3414 ml       Physical Exam Performed:    BP 97/70   Pulse 99   Temp 98.1 °F (36.7 °C) (Oral)   Resp 16   Ht 5' 3\" (1.6 m)   Wt 138 lb 7.2 oz (62.8 kg)   SpO2 98%   BMI 24.53 kg/m²     General appearance: No apparent distress, appears stated age and cooperative. HEENT: Pupils equal, round, and reactive to light. Conjunctivae/corneas clear. Neck: Supple, with full range of motion.  No jugular venous distention. Trachea midline. Respiratory: Occasional basal crackles  Cardiovascular: Regular rate and rhythm with normal S1/S2 without murmurs, rubs or gallops. Abdomen: Soft, non-tender, non-distended with normal bowel sounds. Musculoskeletal: Lower extremity edema  Skin: Skin color, texture, turgor normal.  No rashes or lesions. Neurologic: Awake oriented x3      Labs:   Recent Labs     02/12/23  0425 02/13/23  0330   WBC 8.7 8.5   HGB 12.6 13.8   HCT 40.4 44.1    342     Recent Labs     02/11/23  1818 02/12/23  0425 02/13/23  0330    140 141   K 3.8 3.3*  3.3* 4.1  4.1   CL 97* 95* 95*   CO2 34* 34* 33*   BUN 11 10 10   CREATININE 0.7 0.7 0.7   CALCIUM 7.8* 8.5 9.0   PHOS 2.7 3.3 4.1     Recent Labs     02/11/23  0632 02/12/23  0425 02/13/23  0330   * 401* 273*   * 746* 644*   BILIDIR <0.2 <0.2 <0.2   BILITOT 0.5 0.6 0.7   ALKPHOS 104 129 137*     Recent Labs     02/12/23  0940 02/12/23  1850 02/13/23  0330   INR 1.11 1.13 1.14     No results for input(s): Alisa Barr in the last 72 hours. Urinalysis:      Lab Results   Component Value Date/Time    NITRU POSITIVE 02/03/2023 07:49 PM    WBCUA 21-50 02/03/2023 07:49 PM    BACTERIA 4+ 02/03/2023 07:49 PM    RBCUA 11-20 02/03/2023 07:49 PM    RBCUA 9 08/10/2021 01:51 PM    BLOODU LARGE 02/03/2023 07:49 PM    SPECGRAV >=1.030 02/03/2023 07:49 PM    GLUCOSEU Negative 02/03/2023 07:49 PM       Radiology:  CT CHEST ABDOMEN PELVIS W CONTRAST Additional Contrast? None   Final Result      1. There is edema identified within the fat planes of the neck as well as within the subcutaneous fat about the thorax. There is also edema within the superior mediastinum. Correlate for anasarca-third spacing. 2. Large layering right-sided pleural effusion is increased from the prior study. There is a small layering left pleural effusion which is mildly increased from the prior study.    3. There is some compressive atelectasis within dependent portion of right lower lobe. Linear atelectasis is present with the right middle lobe and lingula. 4. There are multiple venous collaterals identified about the left shoulder girdle. This is nonspecific. The SVC, left innominate vein, left subclavian vein and left axillary vein are patent. 5. There is some enlargement of the left ventricle, right ventricle and right atrium. Correlate for cardiomyopathy. This could be better evaluated with echocardiogram.      ABDOMEN AND PELVIS: There is subcutaneous edema identified about the abdomen and pelvis. Note should be made that the inferior pelvis was not included within the field-of-view. There is a small amount of free fluid identified with the right paracolic gutter, Morison's pouch as well as the pelvic cul-de-sac. Liver attenuation is decreased. There is some gallbladder wall edema. Pancreas, spleen and adrenal glands are normal. Renal size and enhancement is normal.      No bowel dilatation or bowel wall thickening is identified. Note should be made that some small bowel loops as well as the cecum were not entirely included within the field-of-view due to incomplete imaging through the pelvis. No adnexal mass is identified. No lymphadenopathy is identified. IMPRESSION:      1. Subcutaneous edema is identified about the abdomen and pelvis. This can be seen with anasarca-third spacing. 2. There is gallbladder wall edema. This is nonspecific and may be related to volume overload. Cholecystitis cannot entirely be excluded. 3. Liver attenuation is decreased. This may reflect hepatic steatosis or hepatitis. Correlate with liver function tests. 4. Small amount of free fluid is identified within the abdomen and pelvis. CT HEAD WO CONTRAST   Final Result   1. No acute intracranial process. CT ABDOMEN PELVIS WO CONTRAST Additional Contrast? None   Final Result      1.  Small to moderate right pleural effusion and small left pleural effusion with subsegmental atelectasis. No visible pneumonia. 2. Mild mesenteric and retroperitoneal edema, trace ascites and periportal edema. Hepatocellular disease is a possibility. Correlate with liver function tests. XR CHEST (2 VW)   Final Result      No evidence for acute cardiopulmonary disease. IP CONSULT TO SOCIAL WORK  IP CONSULT TO PSYCHIATRY  IP CONSULT TO GI  IP CONSULT TO CRITICAL CARE  IP CONSULT TO CARDIOLOGY    Assessment/Plan:    Active Hospital Problems    Diagnosis     Acute systolic congestive heart failure (HCC) [I50.21]      Priority: Medium    Primary hypertension [I10]      Priority: Medium    Acute hepatitis B [B16.9]      Priority: Medium    Bipolar affective disorder, current episode manic ([facility] [address] [F31.10]      Priority: Medium    Pyelonephritis [N12]      Priority: Medium    Acute pyelonephritis [N10]      Priority: Medium     1. Acute systolic heart failure likely due to illicit drug use and stress induced cardiomyopathy.  -Continue Lasix drips for now consider to change to oral in the next 1 to 2 days.  -Continue Entresto  -Monitor intake output and daily body weight  -Switch to regular diet but with low sodium. 2.  Severe transaminitis likely due to viral hepatitis B and C and possible liver ischemia due to severe heart failure. -LFTs are trending downward. Continue to monitor and avoid hepatotoxic medication. -GI recommended reassessment of hepatitis B status and potential for hepatitis C re-treatment as outpatient    3. Acute pyelonephritis s/p treatment this admission    4. History of substance abuse. Urine drug screen was positive for amphetamine. 5.  History of bipolar disorder  -Seen by psych and did not recommended inpatient psych. 6.  Acute metabolic encephalopathy likely due to severe liver disease, UTI. Resolved. DVT Prophylaxis: Lovenox  Diet: ADULT DIET; Regular;  Low Sodium (2 gm)  Code Status: Full Code  PT/OT Eval Status: No need    Dispo -she is homeless and might need shelter on discharge. Follow-up  recommendation when she is ready for discharge.       Radha Mercedes MD

## 2023-02-14 PROBLEM — R00.0 TACHYCARDIA: Status: ACTIVE | Noted: 2023-02-14

## 2023-02-14 PROBLEM — I95.2 HYPOTENSION DUE TO DRUGS: Status: ACTIVE | Noted: 2023-02-14

## 2023-02-14 PROBLEM — R79.89 ELEVATED LFTS: Status: ACTIVE | Noted: 2023-02-14

## 2023-02-14 LAB
ALBUMIN SERPL-MCNC: 3.3 G/DL (ref 3.4–5)
ALP BLD-CCNC: 118 U/L (ref 40–129)
ALT SERPL-CCNC: 486 U/L (ref 10–40)
ANION GAP SERPL CALCULATED.3IONS-SCNC: 10 MMOL/L (ref 3–16)
AST SERPL-CCNC: 179 U/L (ref 15–37)
BASOPHILS ABSOLUTE: 0.1 K/UL (ref 0–0.2)
BASOPHILS RELATIVE PERCENT: 0.5 %
BILIRUB SERPL-MCNC: 0.4 MG/DL (ref 0–1)
BILIRUBIN DIRECT: <0.2 MG/DL (ref 0–0.3)
BILIRUBIN, INDIRECT: ABNORMAL MG/DL (ref 0–1)
BUN BLDV-MCNC: 18 MG/DL (ref 7–20)
CALCIUM SERPL-MCNC: 8.4 MG/DL (ref 8.3–10.6)
CHLORIDE BLD-SCNC: 94 MMOL/L (ref 99–110)
CO2: 33 MMOL/L (ref 21–32)
CREAT SERPL-MCNC: 0.8 MG/DL (ref 0.6–1.1)
EOSINOPHILS ABSOLUTE: 0.3 K/UL (ref 0–0.6)
EOSINOPHILS RELATIVE PERCENT: 3.2 %
GFR SERPL CREATININE-BSD FRML MDRD: >60 ML/MIN/{1.73_M2}
GLUCOSE BLD-MCNC: 95 MG/DL (ref 70–99)
HCT VFR BLD CALC: 36.8 % (ref 36–48)
HEMOGLOBIN: 11.6 G/DL (ref 12–16)
LYMPHOCYTES ABSOLUTE: 4.4 K/UL (ref 1–5.1)
LYMPHOCYTES RELATIVE PERCENT: 45 %
MAGNESIUM: 2.1 MG/DL (ref 1.8–2.4)
MCH RBC QN AUTO: 26.2 PG (ref 26–34)
MCHC RBC AUTO-ENTMCNC: 31.6 G/DL (ref 31–36)
MCV RBC AUTO: 83 FL (ref 80–100)
MONOCYTES ABSOLUTE: 0.9 K/UL (ref 0–1.3)
MONOCYTES RELATIVE PERCENT: 9 %
NEUTROPHILS ABSOLUTE: 4.2 K/UL (ref 1.7–7.7)
NEUTROPHILS RELATIVE PERCENT: 42.3 %
PDW BLD-RTO: 18.8 % (ref 12.4–15.4)
PLATELET # BLD: 337 K/UL (ref 135–450)
PMV BLD AUTO: 8.8 FL (ref 5–10.5)
POTASSIUM REFLEX MAGNESIUM: 3.4 MMOL/L (ref 3.5–5.1)
RBC # BLD: 4.44 M/UL (ref 4–5.2)
SODIUM BLD-SCNC: 137 MMOL/L (ref 136–145)
T4 FREE: 1.4 NG/DL (ref 0.9–1.8)
TOTAL PROTEIN: 6.3 G/DL (ref 6.4–8.2)
TSH SERPL DL<=0.05 MIU/L-ACNC: 2.74 UIU/ML (ref 0.27–4.2)
WBC # BLD: 9.9 K/UL (ref 4–11)

## 2023-02-14 PROCEDURE — 85025 COMPLETE CBC W/AUTO DIFF WBC: CPT

## 2023-02-14 PROCEDURE — 6370000000 HC RX 637 (ALT 250 FOR IP): Performed by: INTERNAL MEDICINE

## 2023-02-14 PROCEDURE — 80076 HEPATIC FUNCTION PANEL: CPT

## 2023-02-14 PROCEDURE — 6360000002 HC RX W HCPCS: Performed by: INTERNAL MEDICINE

## 2023-02-14 PROCEDURE — 2580000003 HC RX 258: Performed by: STUDENT IN AN ORGANIZED HEALTH CARE EDUCATION/TRAINING PROGRAM

## 2023-02-14 PROCEDURE — 6370000000 HC RX 637 (ALT 250 FOR IP): Performed by: NURSE PRACTITIONER

## 2023-02-14 PROCEDURE — 87799 DETECT AGENT NOS DNA QUANT: CPT

## 2023-02-14 PROCEDURE — 6370000000 HC RX 637 (ALT 250 FOR IP): Performed by: STUDENT IN AN ORGANIZED HEALTH CARE EDUCATION/TRAINING PROGRAM

## 2023-02-14 PROCEDURE — 2060000000 HC ICU INTERMEDIATE R&B

## 2023-02-14 PROCEDURE — 99233 SBSQ HOSP IP/OBS HIGH 50: CPT | Performed by: NURSE PRACTITIONER

## 2023-02-14 PROCEDURE — 80048 BASIC METABOLIC PNL TOTAL CA: CPT

## 2023-02-14 PROCEDURE — 83735 ASSAY OF MAGNESIUM: CPT

## 2023-02-14 RX ORDER — METOPROLOL SUCCINATE 25 MG/1
12.5 TABLET, EXTENDED RELEASE ORAL DAILY
Status: DISCONTINUED | OUTPATIENT
Start: 2023-02-14 | End: 2023-02-15

## 2023-02-14 RX ORDER — FUROSEMIDE 40 MG/1
40 TABLET ORAL DAILY
Status: DISCONTINUED | OUTPATIENT
Start: 2023-02-14 | End: 2023-02-17

## 2023-02-14 RX ORDER — POTASSIUM CHLORIDE 20 MEQ/1
40 TABLET, EXTENDED RELEASE ORAL ONCE
Status: COMPLETED | OUTPATIENT
Start: 2023-02-14 | End: 2023-02-14

## 2023-02-14 RX ADMIN — FUROSEMIDE 40 MG: 40 TABLET ORAL at 10:36

## 2023-02-14 RX ADMIN — Medication 10 MG: at 23:10

## 2023-02-14 RX ADMIN — SODIUM CHLORIDE, PRESERVATIVE FREE 10 ML: 5 INJECTION INTRAVENOUS at 23:10

## 2023-02-14 RX ADMIN — SACUBITRIL AND VALSARTAN 0.5 TABLET: 24; 26 TABLET, FILM COATED ORAL at 10:34

## 2023-02-14 RX ADMIN — EMPAGLIFLOZIN 10 MG: 10 TABLET, FILM COATED ORAL at 10:35

## 2023-02-14 RX ADMIN — POTASSIUM CHLORIDE 40 MEQ: 1500 TABLET, EXTENDED RELEASE ORAL at 10:34

## 2023-02-14 RX ADMIN — LACTULOSE 20 G: 20 SOLUTION ORAL at 10:33

## 2023-02-14 RX ADMIN — LURASIDONE HYDROCHLORIDE 20 MG: 40 TABLET, FILM COATED ORAL at 13:42

## 2023-02-14 RX ADMIN — SODIUM CHLORIDE, PRESERVATIVE FREE 10 ML: 5 INJECTION INTRAVENOUS at 23:11

## 2023-02-14 RX ADMIN — SODIUM CHLORIDE, PRESERVATIVE FREE 10 ML: 5 INJECTION INTRAVENOUS at 10:39

## 2023-02-14 RX ADMIN — SODIUM CHLORIDE, PRESERVATIVE FREE 10 ML: 5 INJECTION INTRAVENOUS at 10:40

## 2023-02-14 ASSESSMENT — PAIN SCALES - GENERAL
PAINLEVEL_OUTOF10: 0

## 2023-02-14 NOTE — PROGRESS NOTES
CC: CHF  HPI:     S: Denies cp, sob or edema. Denies orthopnea or abdominal bloating. Tele: Sinus tach    O:  Physical Exam:  /76   Pulse (!) 105   Temp 98.6 °F (37 °C) (Oral)   Resp 16   Ht 5' 3\" (1.6 m)   Wt 137 lb 12.6 oz (62.5 kg)   SpO2 99%   BMI 24.41 kg/m²    General (appearance):  No acute distress  Eyes: anicteric   Neck: soft, No JVD  Ears/Nose/Mouth/Thorat: No cyanosis  CV: RRR    Respiratory:  clear, normal effort  GI: soft, non-tender, non-distended  Skin: Warm, dry. No rashes  Neuro/Psych: Alert and oriented x 3. Appropriate behavior  Ext:  No c/c. No significant LE edema  Pulses:  2+ radial     I.O's= -4.7 L     Weight  Admission: Weight: 130 lb (59 kg)   Today: Weight: 137 lb 12.6 oz (62.5 kg)    CBC:   Recent Labs     23  0350   WBC 8.7 8.5 9.9   HGB 12.6 13.8 11.6*   HCT 40.4 44.1 36.8   MCV 83.9 83.7 83.0    342 337     BMP:   Recent Labs     23  0350     --  140 141 137   K 3.8   < > 3.3*  3.3* 4.1  4.1 3.4*   CL 97*  --  95* 95* 94*   CO2 34*  --  34* 33* 33*   PHOS 2.7  --  3.3 4.1  --    BUN 11  --  10 10 18   CREATININE 0.7  --  0.7 0.7 0.8    < > = values in this interval not displayed. Estimated Creatinine Clearance: 87 mL/min (based on SCr of 0.8 mg/dL). Mag:   Lab Results   Component Value Date/Time    MG 2.10 2023 03:50 AM     LIVER PROFILE:   Recent Labs     230 23  0350   * 273* 179*   * 644* 486*   BILIDIR <0.2 <0.2 <0.2   BILITOT 0.6 0.7 0.4   ALKPHOS 129 137* 118     PT/INR:   Recent Labs     23  0940 23  1850 23  0330   PROTIME 14.3 14.4 14.6*   INR 1.11 1.13 1.14     Pro-BNP:   Lab Results   Component Value Date/Time    PROBNP 4,456 2023 03:14 PM     Imagin2023 Echo   Dilated left ventricle cavity size. Normal left ventricle wall thickness.    Ejection fraction is visually estimated to be 10-15 %. Severe global   hypokinesis of the left ventricle. Grade II diastolic dysfunction with elevated LV filling pressures. Normal right ventricle size. Mildly Reduced right ventricle systolic   function. Moderate mitral regurgitation. Moderate tricuspid regurgitation. IVC size is dilated (>2.1 cm) and collapses < 50% with respiration   consistent with elevated RA pressure (15 mmHg). Estimated pulmonary artery systolic pressure is elevated at 48 mmHg assuming   a right atrial pressure of 15 mmHg. 2/8/2023 CT chest     1. There is edema identified within the fat planes of the neck as well as within the subcutaneous fat about the thorax. There is also edema within the superior mediastinum. Correlate for anasarca-third spacing. 2. Large layering right-sided pleural effusion is increased from the prior study. There is a small layering left pleural effusion which is mildly increased from the prior study. 3. There is some compressive atelectasis within dependent portion of right lower lobe. Linear atelectasis is present with the right middle lobe and lingula. 4. There are multiple venous collaterals identified about the left shoulder girdle. This is nonspecific. The SVC, left innominate vein, left subclavian vein and left axillary vein are patent. 5. There is some enlargement of the left ventricle, right ventricle and right atrium. Correlate for cardiomyopathy. This could be better evaluated with echocardiogram     2/3/2023 CXR     No evidence for acute cardiopulmonary disease. Assessment:  Acute HFrEF.  Biventricular systolic HF  Hypotension  Tachycardia   Elevated LFTs    Plan:  -Keep K>4, Mg>2.  -Lasix gtt switched to lasix 40 mg po daily  -Start toprol 12.5 mg po daily Hold for SBP< 100 mmHg or HR <60 bpm  -Cont entresto 24/26 (1/2 tablet) po bid   -Start jardiance 10 mg po daily    -Defer ICD at this point  -will need ischemic work-up, please obtain CTA if heart rate is an issue, plan for calcium score first.    Us of BB and ACE and MRA will be limited by BP     Medication compliance maybe an issues. Affordability also an issue. Pt is homeless and has hx of mental illness.

## 2023-02-14 NOTE — CARE COORDINATION
CM following for discharge planning. CM met with patient at bedside at nurse request as patient had expressed an interest in inpatient rehab. CM met with patient at bedside. She is currently living in a tent and her 4 children are in [facility]. Pt was tearful but appeared motivated to try an inpatient program. CM reached out to Amie with [facility] who will call patient today and come see her in the am. CM to continue to follow.      Toribio Gallagher RN, BSN, [id number]  Case Management Department  [phone]

## 2023-02-14 NOTE — PROGRESS NOTES
Hospitalist Progress Note      PCP: No primary care provider on file. Date of Admission: 2/3/2023    Chief Complaint: Abdominal pain, nausea and vomiting      Subjective: Chart reviewed. Abdominal pain, nausea and vomiting have resolved. She is upset and do not like the carb restricted diet. She wants to eat regular diet. Still has some swelling in lower extremities but shortness of breath is much better. Medications:  Reviewed    Infusion Medications    sodium chloride      sodium chloride 25 mL (02/12/23 3994)     Scheduled Medications    furosemide  40 mg Oral Daily    metoprolol succinate  12.5 mg Oral Daily    empagliflozin  10 mg Oral Daily    lurasidone  20 mg Oral Daily    sacubitril-valsartan  0.5 tablet Oral BID    magnesium sulfate  4,000 mg IntraVENous Once    enoxaparin  40 mg SubCUTAneous Daily    melatonin  10 mg Oral Nightly    acetaminophen  650 mg Oral Once    lidocaine 1 % injection  5 mL IntraDERmal Once    sodium chloride flush  5-40 mL IntraVENous 2 times per day    polyethylene glycol  17 g Oral Daily    sodium chloride flush  10 mL IntraVENous 2 times per day    nicotine  1 patch TransDERmal Daily     PRN Meds: sodium chloride flush, sodium chloride, docusate sodium, perflutren lipid microspheres, LORazepam, hydrOXYzine HCl, sodium chloride flush, sodium chloride, potassium chloride, magnesium sulfate, promethazine **OR** ondansetron      Intake/Output Summary (Last 24 hours) at 2/14/2023 1344  Last data filed at 2/14/2023 1150  Gross per 24 hour   Intake 340 ml   Output 2300 ml   Net -1960 ml       Physical Exam Performed:    /77   Pulse (!) 115   Temp 98.1 °F (36.7 °C) (Oral)   Resp 17   Ht 5' 3\" (1.6 m)   Wt 137 lb 12.6 oz (62.5 kg)   SpO2 98%   BMI 24.41 kg/m²     General appearance: No apparent distress, appears stated age and cooperative. HEENT: Pupils equal, round, and reactive to light. Conjunctivae/corneas clear.   Neck: Supple, with full range of motion. No jugular venous distention. Trachea midline. Respiratory: Occasional basal crackles  Cardiovascular: Regular rate and rhythm with normal S1/S2 without murmurs, rubs or gallops. Abdomen: Soft, non-tender, non-distended with normal bowel sounds. Musculoskeletal: Lower extremity edema  Skin: Skin color, texture, turgor normal.  No rashes or lesions. Neurologic: Awake oriented x3      Labs:   Recent Labs     02/12/23 0425 02/13/23 0330 02/14/23  0350   WBC 8.7 8.5 9.9   HGB 12.6 13.8 11.6*   HCT 40.4 44.1 36.8    342 337     Recent Labs     02/11/23 1818 02/11/23  1818 02/12/23 0425 02/13/23 0330 02/14/23  0350     --  140 141 137   K 3.8   < > 3.3*  3.3* 4.1  4.1 3.4*   CL 97*  --  95* 95* 94*   CO2 34*  --  34* 33* 33*   BUN 11  --  10 10 18   CREATININE 0.7  --  0.7 0.7 0.8   CALCIUM 7.8*  --  8.5 9.0 8.4   PHOS 2.7  --  3.3 4.1  --     < > = values in this interval not displayed. Recent Labs     02/12/23 0425 02/13/23 0330 02/14/23  0350   * 273* 179*   * 644* 486*   BILIDIR <0.2 <0.2 <0.2   BILITOT 0.6 0.7 0.4   ALKPHOS 129 137* 118     Recent Labs     02/12/23  0940 02/12/23  1850 02/13/23  0330   INR 1.11 1.13 1.14     No results for input(s): Emanuel Salazar in the last 72 hours. Urinalysis:      Lab Results   Component Value Date/Time    NITRU POSITIVE 02/03/2023 07:49 PM    WBCUA 21-50 02/03/2023 07:49 PM    BACTERIA 4+ 02/03/2023 07:49 PM    RBCUA 11-20 02/03/2023 07:49 PM    RBCUA 9 08/10/2021 01:51 PM    BLOODU LARGE 02/03/2023 07:49 PM    SPECGRAV >=1.030 02/03/2023 07:49 PM    GLUCOSEU Negative 02/03/2023 07:49 PM       Radiology:  CT CHEST ABDOMEN PELVIS W CONTRAST Additional Contrast? None   Final Result      1. There is edema identified within the fat planes of the neck as well as within the subcutaneous fat about the thorax. There is also edema within the superior mediastinum. Correlate for anasarca-third spacing.    2. Large layering right-sided pleural effusion is increased from the prior study. There is a small layering left pleural effusion which is mildly increased from the prior study. 3. There is some compressive atelectasis within dependent portion of right lower lobe. Linear atelectasis is present with the right middle lobe and lingula. 4. There are multiple venous collaterals identified about the left shoulder girdle. This is nonspecific. The SVC, left innominate vein, left subclavian vein and left axillary vein are patent. 5. There is some enlargement of the left ventricle, right ventricle and right atrium. Correlate for cardiomyopathy. This could be better evaluated with echocardiogram.      ABDOMEN AND PELVIS: There is subcutaneous edema identified about the abdomen and pelvis. Note should be made that the inferior pelvis was not included within the field-of-view. There is a small amount of free fluid identified with the right paracolic gutter, Morison's pouch as well as the pelvic cul-de-sac. Liver attenuation is decreased. There is some gallbladder wall edema. Pancreas, spleen and adrenal glands are normal. Renal size and enhancement is normal.      No bowel dilatation or bowel wall thickening is identified. Note should be made that some small bowel loops as well as the cecum were not entirely included within the field-of-view due to incomplete imaging through the pelvis. No adnexal mass is identified. No lymphadenopathy is identified. IMPRESSION:      1. Subcutaneous edema is identified about the abdomen and pelvis. This can be seen with anasarca-third spacing. 2. There is gallbladder wall edema. This is nonspecific and may be related to volume overload. Cholecystitis cannot entirely be excluded. 3. Liver attenuation is decreased. This may reflect hepatic steatosis or hepatitis. Correlate with liver function tests. 4. Small amount of free fluid is identified within the abdomen and pelvis.       CT HEAD WO CONTRAST   Final Result   1. No acute intracranial process. CT ABDOMEN PELVIS WO CONTRAST Additional Contrast? None   Final Result      1. Small to moderate right pleural effusion and small left pleural effusion with subsegmental atelectasis. No visible pneumonia. 2. Mild mesenteric and retroperitoneal edema, trace ascites and periportal edema. Hepatocellular disease is a possibility. Correlate with liver function tests. XR CHEST (2 VW)   Final Result      No evidence for acute cardiopulmonary disease. IP CONSULT TO SOCIAL WORK  IP CONSULT TO PSYCHIATRY  IP CONSULT TO GI  IP CONSULT TO CRITICAL CARE  IP CONSULT TO CARDIOLOGY  IP CONSULT TO PSYCHIATRY    Assessment/Plan:    Active Hospital Problems    Diagnosis     Hypotension due to drugs [I95.2]      Priority: Medium    Tachycardia [R00.0]      Priority: Medium    Elevated LFTs [R79.89]      Priority: Medium    Acute HFrEF (heart failure with reduced ejection fraction) (HCC) [I50.21]      Priority: Medium    Primary hypertension [I10]      Priority: Medium    Acute hepatitis B [B16.9]      Priority: Medium    Bipolar affective disorder, current episode manic ([facility] [address] [F31.10]      Priority: Medium    Pyelonephritis [N12]      Priority: Medium    Acute pyelonephritis [N10]      Priority: Medium     1. Acute systolic heart failure likely due to illicit drug use and stress induced cardiomyopathy.  -Continue Lasix drips for now consider to change to oral in the next 1 to 2 days.  -Continue Entresto  -Monitor intake output and daily body weight  -Switch to regular diet but with low sodium. Jaylan Gonzales started today, cardiology input noticed    2. Severe transaminitis likely due to viral hepatitis B and C and possible liver ischemia due to severe heart failure. -LFTs are trending downward. Continue to monitor and avoid hepatotoxic medication.   -GI recommended reassessment of hepatitis B status and potential for hepatitis C re-treatment as outpatient GI recommendations noticed will need to check H BV DNA levels in 3 months to confirm regarding chronic hepatitis B status before considering treatment it can be early infection patient did had a history of hepatitis C treated with [facility] about 2 years back      3. Acute pyelonephritis s/p treatment this admission    4. History of substance abuse. Urine drug screen was positive for amphetamine. 5.  History of bipolar disorder with history of amphetamine use  Patient having facial twitching, not able to complete his sentences, did discuss with psych, patient need to be on her psych medicines, patient used to be on Latuda BuSpar and vortioxetine in the past, after discussion with the psych we did started [place], psych will be seeing the patient tomorrow,  -    6. Acute metabolic encephalopathy likely due to severe liver disease, UTI. Resolved. DVT Prophylaxis: Lovenox  Diet: ADULT DIET; Regular; Low Sodium (2 gm)  Code Status: Full Code  PT/OT Eval Status: No need    Dispo -discussed with the patient, resume [place] and started [place] patient is on [employer] [place]. Medication compliance will be a key. Psych evaluation to help patient psych medicine.   Hopefully discharge tomorrow after psych eval.      Bert Lambert MD

## 2023-02-14 NOTE — BH NOTE
Aware of psychiatry consult placed today at 1300. Discussed with Dr. Lizeth Huggins. I met with Cornelia Taylor in consult on 02/08 prior to her transfer to ICU and recommended restarting her home Latuda at 20 mg daily (with food). It was held when she went to ICU and it was just unheld today (not yet administered at the time this note was filed). Will plan on following up with patient tomorrow at the request of Dr Lizeth Huggins.

## 2023-02-14 NOTE — PLAN OF CARE
Problem: Discharge Planning  Goal: Discharge to home or other facility with appropriate resources  Outcome: Progressing     Problem: Safety - Adult  Goal: Free from fall injury  Outcome: Progressing     Problem: ABCDS Injury Assessment  Goal: Absence of physical injury  Outcome: Progressing     Problem: Pain  Goal: Verbalizes/displays adequate comfort level or baseline comfort level  Outcome: Progressing     Problem: Chronic Conditions and Co-morbidities  Goal: Patient's chronic conditions and co-morbidity symptoms are monitored and maintained or improved  Outcome: Progressing     Problem: Respiratory - Adult  Goal: Achieves optimal ventilation and oxygenation  Outcome: Progressing     Problem: Cardiovascular - Adult  Goal: Maintains optimal cardiac output and hemodynamic stability  Outcome: Progressing  Goal: Absence of cardiac dysrhythmias or at baseline  Outcome: Progressing     Problem: Metabolic/Fluid and Electrolytes - Adult  Goal: Electrolytes maintained within normal limits  Outcome: Progressing  Goal: Hemodynamic stability and optimal renal function maintained  Outcome: Progressing     Problem: Nutrition Deficit:  Goal: Optimize nutritional status  Outcome: Progressing

## 2023-02-15 PROCEDURE — 2580000003 HC RX 258: Performed by: STUDENT IN AN ORGANIZED HEALTH CARE EDUCATION/TRAINING PROGRAM

## 2023-02-15 PROCEDURE — 6370000000 HC RX 637 (ALT 250 FOR IP): Performed by: STUDENT IN AN ORGANIZED HEALTH CARE EDUCATION/TRAINING PROGRAM

## 2023-02-15 PROCEDURE — 2060000000 HC ICU INTERMEDIATE R&B

## 2023-02-15 PROCEDURE — 6370000000 HC RX 637 (ALT 250 FOR IP): Performed by: NURSE PRACTITIONER

## 2023-02-15 PROCEDURE — 99232 SBSQ HOSP IP/OBS MODERATE 35: CPT | Performed by: NURSE PRACTITIONER

## 2023-02-15 PROCEDURE — 6370000000 HC RX 637 (ALT 250 FOR IP): Performed by: INTERNAL MEDICINE

## 2023-02-15 PROCEDURE — 99222 1ST HOSP IP/OBS MODERATE 55: CPT | Performed by: INTERNAL MEDICINE

## 2023-02-15 RX ORDER — METOPROLOL SUCCINATE 25 MG/1
12.5 TABLET, EXTENDED RELEASE ORAL DAILY
Qty: 30 TABLET | Refills: 3 | Status: SHIPPED | OUTPATIENT
Start: 2023-02-16 | End: 2023-02-16 | Stop reason: SDUPTHER

## 2023-02-15 RX ORDER — METOPROLOL SUCCINATE 50 MG/1
50 TABLET, EXTENDED RELEASE ORAL DAILY
Status: DISCONTINUED | OUTPATIENT
Start: 2023-02-16 | End: 2023-02-16

## 2023-02-15 RX ORDER — LOSARTAN POTASSIUM 25 MG/1
25 TABLET ORAL DAILY
Status: DISCONTINUED | OUTPATIENT
Start: 2023-02-16 | End: 2023-02-17

## 2023-02-15 RX ORDER — POTASSIUM CHLORIDE 20 MEQ/1
40 TABLET, EXTENDED RELEASE ORAL ONCE
Status: COMPLETED | OUTPATIENT
Start: 2023-02-15 | End: 2023-02-15

## 2023-02-15 RX ORDER — SPIRONOLACTONE 25 MG/1
25 TABLET ORAL DAILY
Status: DISCONTINUED | OUTPATIENT
Start: 2023-02-16 | End: 2023-02-16

## 2023-02-15 RX ORDER — MAGNESIUM HYDROXIDE/ALUMINUM HYDROXICE/SIMETHICONE 120; 1200; 1200 MG/30ML; MG/30ML; MG/30ML
30 SUSPENSION ORAL EVERY 6 HOURS PRN
Status: DISCONTINUED | OUTPATIENT
Start: 2023-02-15 | End: 2023-02-17 | Stop reason: HOSPADM

## 2023-02-15 RX ORDER — METOPROLOL SUCCINATE 25 MG/1
25 TABLET, EXTENDED RELEASE ORAL ONCE
Status: COMPLETED | OUTPATIENT
Start: 2023-02-15 | End: 2023-02-15

## 2023-02-15 RX ADMIN — LURASIDONE HYDROCHLORIDE 20 MG: 40 TABLET, FILM COATED ORAL at 08:56

## 2023-02-15 RX ADMIN — METOPROLOL SUCCINATE 12.5 MG: 25 TABLET, FILM COATED, EXTENDED RELEASE ORAL at 08:52

## 2023-02-15 RX ADMIN — SODIUM CHLORIDE, PRESERVATIVE FREE 10 ML: 5 INJECTION INTRAVENOUS at 08:57

## 2023-02-15 RX ADMIN — SACUBITRIL AND VALSARTAN 0.5 TABLET: 24; 26 TABLET, FILM COATED ORAL at 10:24

## 2023-02-15 RX ADMIN — POLYETHYLENE GLYCOL 3350 17 G: 17 POWDER, FOR SOLUTION ORAL at 08:54

## 2023-02-15 RX ADMIN — VORTIOXETINE 5 MG: 10 TABLET, FILM COATED ORAL at 14:31

## 2023-02-15 RX ADMIN — SODIUM CHLORIDE, PRESERVATIVE FREE 10 ML: 5 INJECTION INTRAVENOUS at 20:26

## 2023-02-15 RX ADMIN — POTASSIUM CHLORIDE 40 MEQ: 1500 TABLET, EXTENDED RELEASE ORAL at 11:36

## 2023-02-15 RX ADMIN — SODIUM CHLORIDE, PRESERVATIVE FREE 10 ML: 5 INJECTION INTRAVENOUS at 08:56

## 2023-02-15 RX ADMIN — METOPROLOL SUCCINATE 25 MG: 25 TABLET, FILM COATED, EXTENDED RELEASE ORAL at 16:30

## 2023-02-15 RX ADMIN — EMPAGLIFLOZIN 10 MG: 10 TABLET, FILM COATED ORAL at 08:53

## 2023-02-15 RX ADMIN — FUROSEMIDE 40 MG: 40 TABLET ORAL at 08:53

## 2023-02-15 ASSESSMENT — PAIN DESCRIPTION - PAIN TYPE: TYPE: ACUTE PAIN

## 2023-02-15 ASSESSMENT — PAIN DESCRIPTION - ORIENTATION: ORIENTATION: MID

## 2023-02-15 ASSESSMENT — PAIN DESCRIPTION - FREQUENCY: FREQUENCY: INTERMITTENT

## 2023-02-15 ASSESSMENT — PAIN SCALES - GENERAL
PAINLEVEL_OUTOF10: 0
PAINLEVEL_OUTOF10: 3

## 2023-02-15 ASSESSMENT — PAIN DESCRIPTION - LOCATION: LOCATION: ABDOMEN

## 2023-02-15 ASSESSMENT — PAIN DESCRIPTION - ONSET: ONSET: GRADUAL

## 2023-02-15 NOTE — PROGRESS NOTES
Hospitalist Progress Note      PCP: No primary care provider on file. Date of Admission: 2/3/2023    Chief Complaint: Abdominal pain, nausea and vomiting      Subjective: Chart reviewed. Comfortable, did had some mild abdominal pain in the morning which is better      Medications:  Reviewed    Infusion Medications    sodium chloride      sodium chloride 25 mL (02/12/23 4853)     Scheduled Medications    [START ON 2/16/2023] lurasidone  20 mg Oral Nightly    VORTIoxetine  5 mg Oral Daily    furosemide  40 mg Oral Daily    metoprolol succinate  12.5 mg Oral Daily    empagliflozin  10 mg Oral Daily    sacubitril-valsartan  0.5 tablet Oral BID    magnesium sulfate  4,000 mg IntraVENous Once    enoxaparin  40 mg SubCUTAneous Daily    melatonin  10 mg Oral Nightly    acetaminophen  650 mg Oral Once    lidocaine 1 % injection  5 mL IntraDERmal Once    sodium chloride flush  5-40 mL IntraVENous 2 times per day    polyethylene glycol  17 g Oral Daily    sodium chloride flush  10 mL IntraVENous 2 times per day    nicotine  1 patch TransDERmal Daily     PRN Meds: aluminum & magnesium hydroxide-simethicone, sodium chloride flush, sodium chloride, docusate sodium, perflutren lipid microspheres, LORazepam, hydrOXYzine HCl, sodium chloride flush, sodium chloride, potassium chloride, magnesium sulfate, promethazine **OR** ondansetron      Intake/Output Summary (Last 24 hours) at 2/15/2023 1526  Last data filed at 2/15/2023 1432  Gross per 24 hour   Intake 910 ml   Output 1600 ml   Net -690 ml       Physical Exam Performed:    BP 98/65   Pulse 92   Temp 97.5 °F (36.4 °C) (Axillary)   Resp 18   Ht 5' 3\" (1.6 m)   Wt 138 lb 0.1 oz (62.6 kg)   SpO2 98%   BMI 24.45 kg/m²     General appearance: No apparent distress, appears stated age and cooperative. HEENT: Pupils equal, round, and reactive to light. Conjunctivae/corneas clear. Neck: Supple, with full range of motion. No jugular venous distention.  Trachea midline. Respiratory: Occasional basal crackles  Cardiovascular: Regular rate and rhythm with normal S1/S2 without murmurs, rubs or gallops. Abdomen: Soft, non-tender, non-distended with normal bowel sounds. Musculoskeletal: Lower extremity edema  Skin: Skin color, texture, turgor normal.  No rashes or lesions. Neurologic: Awake oriented x3      Labs:   Recent Labs     02/13/23  0330 02/14/23  0350   WBC 8.5 9.9   HGB 13.8 11.6*   HCT 44.1 36.8    337     Recent Labs     02/13/23  0330 02/14/23  0350    137   K 4.1  4.1 3.4*   CL 95* 94*   CO2 33* 33*   BUN 10 18   CREATININE 0.7 0.8   CALCIUM 9.0 8.4   PHOS 4.1  --      Recent Labs     02/13/23  0330 02/14/23  0350   * 179*   * 486*   BILIDIR <0.2 <0.2   BILITOT 0.7 0.4   ALKPHOS 137* 118     Recent Labs     02/12/23  1850 02/13/23  0330   INR 1.13 1.14     No results for input(s): Rhae Childes in the last 72 hours. Urinalysis:      Lab Results   Component Value Date/Time    NITRU POSITIVE 02/03/2023 07:49 PM    WBCUA 21-50 02/03/2023 07:49 PM    BACTERIA 4+ 02/03/2023 07:49 PM    RBCUA 11-20 02/03/2023 07:49 PM    RBCUA 9 08/10/2021 01:51 PM    BLOODU LARGE 02/03/2023 07:49 PM    SPECGRAV >=1.030 02/03/2023 07:49 PM    GLUCOSEU Negative 02/03/2023 07:49 PM       Radiology:  CT CHEST ABDOMEN PELVIS W CONTRAST Additional Contrast? None   Final Result      1. There is edema identified within the fat planes of the neck as well as within the subcutaneous fat about the thorax. There is also edema within the superior mediastinum. Correlate for anasarca-third spacing. 2. Large layering right-sided pleural effusion is increased from the prior study. There is a small layering left pleural effusion which is mildly increased from the prior study. 3. There is some compressive atelectasis within dependent portion of right lower lobe. Linear atelectasis is present with the right middle lobe and lingula.    4. There are multiple venous collaterals identified about the left shoulder girdle. This is nonspecific. The SVC, left innominate vein, left subclavian vein and left axillary vein are patent. 5. There is some enlargement of the left ventricle, right ventricle and right atrium. Correlate for cardiomyopathy. This could be better evaluated with echocardiogram.      ABDOMEN AND PELVIS: There is subcutaneous edema identified about the abdomen and pelvis. Note should be made that the inferior pelvis was not included within the field-of-view. There is a small amount of free fluid identified with the right paracolic gutter, Morison's pouch as well as the pelvic cul-de-sac. Liver attenuation is decreased. There is some gallbladder wall edema. Pancreas, spleen and adrenal glands are normal. Renal size and enhancement is normal.      No bowel dilatation or bowel wall thickening is identified. Note should be made that some small bowel loops as well as the cecum were not entirely included within the field-of-view due to incomplete imaging through the pelvis. No adnexal mass is identified. No lymphadenopathy is identified. IMPRESSION:      1. Subcutaneous edema is identified about the abdomen and pelvis. This can be seen with anasarca-third spacing. 2. There is gallbladder wall edema. This is nonspecific and may be related to volume overload. Cholecystitis cannot entirely be excluded. 3. Liver attenuation is decreased. This may reflect hepatic steatosis or hepatitis. Correlate with liver function tests. 4. Small amount of free fluid is identified within the abdomen and pelvis. CT HEAD WO CONTRAST   Final Result   1. No acute intracranial process. CT ABDOMEN PELVIS WO CONTRAST Additional Contrast? None   Final Result      1. Small to moderate right pleural effusion and small left pleural effusion with subsegmental atelectasis. No visible pneumonia.       2. Mild mesenteric and retroperitoneal edema, trace ascites and periportal edema. Hepatocellular disease is a possibility. Correlate with liver function tests. XR CHEST (2 VW)   Final Result      No evidence for acute cardiopulmonary disease. IP CONSULT TO SOCIAL WORK  IP CONSULT TO PSYCHIATRY  IP CONSULT TO GI  IP CONSULT TO CRITICAL CARE  IP CONSULT TO CARDIOLOGY  IP CONSULT TO PSYCHIATRY    Assessment/Plan:    Active Hospital Problems    Diagnosis     Hypotension due to drugs [I95.2]      Priority: Medium    Tachycardia [R00.0]      Priority: Medium    Elevated LFTs [R79.89]      Priority: Medium    Acute HFrEF (heart failure with reduced ejection fraction) (HCC) [I50.21]      Priority: Medium    Primary hypertension [I10]      Priority: Medium    Acute hepatitis B [B16.9]      Priority: Medium    Bipolar affective disorder, current episode manic ([facility] [address] [F31.10]      Priority: Medium    Pyelonephritis [N12]      Priority: Medium    Acute pyelonephritis [N10]      Priority: Medium     1. Acute systolic heart failure likely due to illicit drug use and stress induced cardiomyopathy.  -Continue Lasix drips for now consider to change to oral in the next 1 to 2 days.  -Continue Entresto  -Monitor intake output and daily body weight  -Switch to regular diet but with low sodium. Lafaye Reach started today, cardiology input noticed  I was notified by RN that cardiology planning testing    2. Severe transaminitis likely due to viral hepatitis B and C and possible liver ischemia due to severe heart failure. -LFTs are trending downward. Continue to monitor and avoid hepatotoxic medication. -GI recommended reassessment of hepatitis B status and potential for hepatitis C re-treatment as outpatient GI recommendations noticed will need to check H BV DNA levels in 3 months to confirm regarding chronic hepatitis B status before considering treatment it can be early infection patient did had a history of hepatitis C treated with Milmay Combe about 2 years back      3.   Acute pyelonephritis s/p treatment this admission    4. History of substance abuse. Urine drug screen was positive for amphetamine. 5.  History of bipolar disorder with history of amphetamine use  Started on [place] and Trintellix, psych input noticed  -    6. Acute metabolic encephalopathy likely due to severe liver disease, UTI. Resolved. DVT Prophylaxis: Lovenox  Diet: ADULT DIET; Regular; Low Sodium (2 gm)  Code Status: Full Code  PT/OT Eval Status: No need    Dispo -discussed with the patient, resume [place] and started [place] patient is on [place] [place]. Medication compliance will be a key. Psych evaluation to help patient psych medicine.   Hopefully discharge tomorrow after psych eval.      Bonnie Pappas MD

## 2023-02-15 NOTE — CONSULTS
Cardiology Consultation                                                                    Pt Name: Rj Darby  Age: 28 y.o. Sex: female  : 1995  Location: [id number]/[id number]    Referring Physician: Jessica Laura MD  Primary cardiologist: Dr John Acosta / Dr Josh Menchaca      Reason for Consult:     Reason for Consultation/Chief Complaint: new and acute HFrEF    HPI:      Rj Darby is a 28 y.o. female with a past medical history of IV drug use (amphetamines) and crack cocaine, past heavy alcohol use (quit 2022), HBV, HCV, bipolar disorder, HTN. Patient presented to the emergency room on 2/3 with abdominal and back pain, shortness of breath, nausea vomiting, fevers. She was admitted for acute systolic heart failure, acute liver injury, acute pyelonephritis. Echo 2023: Mild LVD (LVEDD 6.1 cm), EF 05-46%, grade 2 diastolic dysfunction with increased filling pressures, normal RV size with mild RV dysfunction, moderate MR, moderate TR, RVSP 48 (15). ECG consistent with sinus tachycardia, nonspecific changes. CT chest/abdomen/pelvis with contrast 23: Anasarca, large right-sided pleural effusion, enlargement of the left ventricle, right ventricle, right atrium    UDS positive for amphetamines. Cardiology was consulted, Dr. John Acosta originally saw patient. She was diuresed successfully and was placed on heart failure medications. I was consulted today to assess patient for new onset of severe systolic heart failure. Patient reports an episode of severe midsternal chest pain with dyspnea that happened in the last week of 2023. She claims symptoms lasted all day. Otherwise she denies any previous cardiac history. Currently reports improved symptoms, she is at her baseline. She is planning to be transferred to a rehab facility for drug rehabilitation.       Histories     Past Medical History:   has a past medical history of Allergic rhinitis, Asthma, Bipolar 1 disorder ([facility] [address] Bipolar affective disorder ([facility] [address] Cigarette nicotine dependence, History of amphetamine dependence/abuse ([facility] [address] Hypertension, and Systolic CHF ([facility] [address] Surgical History:   has a past surgical history that includes  section. Social History:   reports that she has been smoking cigarettes. She has been smoking an average of .5 packs per day. She has never used smokeless tobacco. She reports current drug use. Drug: Methamphetamines (Crystal Meth). She reports that she does not drink alcohol. Family History:  No evidence for sudden cardiac death or premature CAD      Medications:       Home Medications  Were reviewed and are listed in nursing record. and/or listed below  Prior to Admission medications    Medication Sig Start Date End Date Taking?  Authorizing Provider   metoprolol succinate (TOPROL XL) 25 MG extended release tablet Take 0.5 tablets by mouth daily 23  Yes Darnell Godinez MD   sacubitril-valsartan [facility]) 24-26 MG per tablet Take 0.5 tablets by mouth 2 times daily 2/15/23  Yes Jairon Deluna MD   empagliflozin (JARDIANCE) 10 MG tablet Take 1 tablet by mouth daily 23  Yes Jairon Deluna MD   VORTIoxetine [facility]) 5 MG tablet Take 1 tablet by mouth daily 2/15/23  Yes Jairon Deluna MD   lurasidone (LATUDA) 20 MG TABS tablet Take 1 tablet by mouth nightly 2/15/23  Yes Jairon Deluna MD          Inpatient Medications:   [START ON 2023] lurasidone  20 mg Oral Nightly    VORTIoxetine  5 mg Oral Daily    [START ON 2023] metoprolol succinate  50 mg Oral Daily    metoprolol succinate  25 mg Oral Once    [START ON 2023] losartan  25 mg Oral Daily    [START ON 2023] spironolactone  25 mg Oral Daily    furosemide  40 mg Oral Daily    magnesium sulfate  4,000 mg IntraVENous Once    enoxaparin  40 mg SubCUTAneous Daily    melatonin  10 mg Oral Nightly    acetaminophen  650 mg Oral Once    lidocaine 1 % injection  5 mL IntraDERmal Once sodium chloride flush  5-40 mL IntraVENous 2 times per day    polyethylene glycol  17 g Oral Daily    sodium chloride flush  10 mL IntraVENous 2 times per day    nicotine  1 patch TransDERmal Daily       IV drips:   sodium chloride      sodium chloride 25 mL (02/12/23 1579)       PRN:  aluminum & magnesium hydroxide-simethicone, sodium chloride flush, sodium chloride, docusate sodium, perflutren lipid microspheres, LORazepam, hydrOXYzine HCl, sodium chloride flush, sodium chloride, potassium chloride, magnesium sulfate, promethazine **OR** ondansetron    Allergy:     Latex, Bee venom, Pcn [penicillins], Amoxil [amoxicillin], and Sulfa antibiotics       Review of Systems:     All 12 point review of symptoms completed. Pertinent positives identified in the HPI, all other review of symptoms negative as below. CONSTITUTIONAL: No fatigue  SKIN: No rash or pruritis. EYES: No visual changes or diplopia. No scleral icterus. ENT: No Headaches, hearing loss or vertigo. No mouth sores or sore throat. CARDIOVASCULAR: No chest pain/chest pressure/chest discomfort. No palpitations. No edema. RESPIRATORY: No dyspnea. No cough or wheezing, no sputum production. GASTROINTESTINAL: No N/V/D. No abdominal pain, appetite loss, blood in stools. GENITOURINARY: No dysuria, trouble voiding, or hematuria. MUSCULOSKELETAL:  No gait disturbance, weakness or joint complaints. NEUROLOGICAL: No headache, diplopia, change in muscle strength, numbness or tingling. No change in gait, balance, coordination, mood, affect, memory, mentation, behavior. ENDOCRINE: No excessive thirst, fluid intake, or urination. No tremor. HEMATOLOGIC: No abnormal bruising or bleeding. ALLERGY: No nasal congestion or hives.       Physical Examination:     Vitals:    02/15/23 0831 02/15/23 0852 02/15/23 1023 02/15/23 1137   BP: 110/75  125/63 98/65   Pulse:  (!) 106 (!) 101 92   Resp:       Temp:    97.5 °F (36.4 °C)   TempSrc:    Axillary   SpO2:    98% Weight:       Height:           Wt Readings from Last 3 Encounters:   02/15/23 138 lb 0.1 oz (62.6 kg)   08/10/21 144 lb 4 oz (65.4 kg)   08/18/17 155 lb (70.3 kg)         General Appearance:  Alert, cooperative, no distress, appears stated age Appropriate weight   Head:  Normocephalic, without obvious abnormality, atraumatic   Eyes:  PERRL, conjunctiva/corneas clear EOM intact  Ears normal   Throat no lesions       Nose: Nares normal, no drainage or sinus tenderness   Throat: Lips, mucosa, and tongue normal   Neck: Supple, symmetrical, trachea midline, no adenopathy, thyroid: not enlarged, symmetric, no tenderness/mass/nodules, no carotid bruit. Lungs:   Respirations unlabored, clear to auscultation bilaterally, without any wheezes, rubs or ronchi. Chest Wall:  No tenderness or deformity   Heart:  Regular rhythm, rate is controlled, S1, S2 normal, there is no murmur, there is no rub or gallop, cannot assess jvd, no bilateral lower extremity edema   Abdomen:   Soft, non-tender, bowel sounds active all four quadrants,  no masses, no organomegaly       Extremities: Extremities normal, atraumatic, no cyanosis. Pulses: 2+ and symmetric   Skin: Skin color, texture, turgor normal, no rashes or lesions   Pysch: Normal mood and affect   Neurologic: Normal gross motor and sensory exam.  Cranial nerves intact        Labs:     Recent Labs     02/13/23  0330 02/14/23  0350    137   K 4.1  4.1 3.4*   BUN 10 18   CREATININE 0.7 0.8   CL 95* 94*   CO2 33* 33*   GLUCOSE 152* 95   CALCIUM 9.0 8.4   MG 2.00 2.10     Recent Labs     02/13/23  0330 02/14/23  0350   WBC 8.5 9.9   HGB 13.8 11.6*   HCT 44.1 36.8    337   MCV 83.7 83.0     No results for input(s): CHOLTOT, TRIG, HDL, CHOLHDL, LDL in the last 72 hours. Invalid input(s): [employer], [id number]  Recent Labs     02/12/23  1850 02/13/23  0330   INR 1.13 1.14     No results for input(s): CKTOTAL, CKMB, CKMBINDEX, TROPONINI in the last 72 hours.   No results for input(s): BNP in the last 72 hours. No results for input(s): TSH in the last 72 hours. No results for input(s): CHOL, HDL, LDLCALC, TRIG in the last 72 hours.]    Lab Results   Component Value Date    CKTOTAL 462 (H) 02/08/2023         Telemetry:     Telemetry personally reviewed:  NSR    Assessment / Plan:     1. Acute and new HFrEF. Patient presented with volume overload/acute heart failure. She has severe systolic dysfunction. This could be ischemic in etiology (CAD, patient with an episode of severe chest pain couple of weeks ago, positive for amphetamines - high risk for plaque rupture) versus (more likely) nonischemic cardiomyopathy (cocaine and amphetamines, prior alcohol use). He is now euvolemic and hemodynamically tenuous.    - Plan for coronary CTA tomorrow am (hopefully rate better controlled). - In view of patient's financial condition and for better compliance, will place her on affordable medications for heart failure. - Will change Entresto half tab of low dose bid to losartan 25 mg daily due to low Bps, cost and compliance. - Will increase Toprol gradually to 50 mg daily tomorrow. Rate needs to be better controlled for CCTA. - Will start ryan 25 daily  - Cw lasix 40 daily  - Will not start Jardiance this admission due to low BP and cost of medication; will plan to consider it as outpatient (will discuss at that time). - If LVEF remains < 35% after 90 days of GDMT, will discuss referral to EP for possible device placement. In the meantime, patient will need a LifeVest (as outpatient). - Strict I's and O's every shift and standing weights if possible, low-salt diet, daily BMP with reflex to Mg (correct lytes for goals K >4.0 and Mg > 2.0) and wean supplemental oxygen to off (or down to baseline supplemental oxygen requirements) for sats greater than 92-94%.     2.  Multi-substance abuse:  I had an extensive discussion with patient explaining the risks of substance abuse especially for ACS and HF.     -Patient will be discharged to a drug rehab facility. 3. Acute liver injury:   I suspect multifactorial (hepatitis B and C infection, liver congestion from acute heart failure). Liver enzymes are now improving.    - Treatment per #1 for heart failure  - Hepatitis infection to be treated per primary team.    4. Acute pyelonephritis:   On antibiotics per primary team.                I have personally reviewed the reports and images of labs, radiological studies, cardiac studies including ECG's and telemetry, current and old medical records. The note was completed using EMR and Dragon dictation system. Every effort was made to ensure accuracy; however, inadvertent computerized transcription errors may be present. All questions and concerns were addressed to the patient/family. Alternatives to my treatment were discussed. I would like to thank you for providing me the opportunity to participate in the care of your patient. If you have any questions, please do not hesitate to contact me.      Rafia Phillips MD, [facility], [address]  The [address]  [facility] [place] [address] [place] [address]  Ph: [phone]  Fax: [phone]

## 2023-02-15 NOTE — CONSULTS
Psychiatry Follow-Up Consultation    Patient Name: Fany Still  MRN: [id number]  Admission Date: 2/3/2023    Reason for Consult: H/o bipolar, not on any psych meds at this time, psych medication recommendations for discharge    Patient's chart was reviewed, case was discussed with nursing/OT/RT staff, and collaborated with  about the treatment plan. Met with Cornelia Taylor for initial psychiatry consult on 02/08/2023 and recommended restarting her on Latuda 20 mg daily with food (was previously on 40 mg; we discussed starting at a lower dose and tapering up) as she was on it previously and felt it was helpful. She then transferred to ICU and Luna Locus was held. I spoke to Dr. Lizeth Huggins on 02/14; Luna Locus was restarted at that time. 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 .     Met with Barbie. She was asleep but woke to voice after multiple attempts with startle response. She had underlying irritability but was more cooperative than when I met with her on 02/08; did not yell at me today. She states that she was previously on Latuda 40 mg daily and Trintellix on \"the lowest dose\". States she has been on the Trintellix for a few weeks and finds it helpful with her depression. She is interested in restarting this. States she is on the Buspar to help her stop smoking. Has plans to follow up with [facility] substance abuse treatment.      ROS: Patient has new complaints: No    Current Medications Ordered:   furosemide  40 mg Oral Daily    metoprolol succinate  12.5 mg Oral Daily    empagliflozin  10 mg Oral Daily    lurasidone  20 mg Oral Daily    sacubitril-valsartan  0.5 tablet Oral BID    magnesium sulfate  4,000 mg IntraVENous Once    enoxaparin  40 mg SubCUTAneous Daily    melatonin  10 mg Oral Nightly    acetaminophen  650 mg Oral Once    lidocaine 1 % injection  5 mL IntraDERmal Once    sodium chloride flush  5-40 mL IntraVENous 2 times per day polyethylene glycol  17 g Oral Daily    sodium chloride flush  10 mL IntraVENous 2 times per day    nicotine  1 patch TransDERmal Daily      PRN Meds: aluminum & magnesium hydroxide-simethicone, sodium chloride flush, sodium chloride, docusate sodium, perflutren lipid microspheres, LORazepam, hydrOXYzine HCl, sodium chloride flush, sodium chloride, potassium chloride, magnesium sulfate, promethazine **OR** ondansetron     Objective:     PE:    BP 97/64   Pulse 93   Temp 97.8 °F (36.6 °C) (Oral)   Resp 16   Ht 5' 3\" (1.6 m)   Wt 138 lb 0.1 oz (62.6 kg)   SpO2 98%   BMI 24.45 kg/m²       Motor / Gait: Observed laying in bed, gait deferred    Mental Status Examination:    Appearance: WF, appears stated age, lying in bed, wearing hospital attire, fair grooming and fair hygiene  Behavior/Attitude Toward Examiner: Underlying irritability but more cooperative than when I saw her 2/8, limited eye contact  Speech: Normal rate, normal volume  Mood: \"I'm tired\", irritability  Affect: Mood congruent   Thought Processes: Linear  Thought Content: No SI/HI verbalized, no delusions voiced, no obsessions  Perceptions: No AVH verbalized, did not appear to be RTIS  Attention: Intact to interview  Cognition: Alert and oriented to person, place, time, and situation  Insight: Limited insight   Judgment: Limited judgment      LAB: Reviewed labs from last 24 hours      Dx:   Primary Psychiatric (DSM V) Diagnosis: Bipolar disorder  Secondary Psychiatric (DSM V) Diagnoses: PTSD, bulimia nervosa, ADHD per history  Chemical Dependency Diagnoses: History of stimulant use disorder; cannabis use    Assessment  Reviewed nursing and ancillary staff notes since arrival to hospital, reviewed previous records and records available through Care Everywhere. Evaluated medications and assessed for side effects and effectiveness.  Assessed patient's educational needs including reviewing plan of care, medications, and diagnosis. Recommendations:    Richie Lazaro was initially ordered 02/08 but ultimately held when she transferred to ICU due to medical issues. Restarted at 20 mg daily with food yesterday, 02/14/23. Will change to HS as Latuda can be sedating when given during the day. Will restart home Trintellix 5 mg daily. Unclear if this is on formulary at [facility] inpatient pharmacy. Will hold off on ordering Buspar at this time. Recommend she get established with an outpatient psychiatric provider. She states that she has plans to follow up with [facility] for substance abuse treatment. Will place additional referrals in AVS.     Total face to face time with patient was 35 minutes and more than 50% of that time was spent counseling the patient on their symptoms, treatment and expected goals.     Pedrito Alba, MPH, PMHNP-BC  02/15/23

## 2023-02-15 NOTE — CARE COORDINATION
CM following for discharge planning. Amie from [facility] met with patient and she has agreed to go to outpt treatment and they will pick her up from the hospital at discharge. Amie is requesting patient be discharged by 1:00pm if possible. Pt is scheduled to have coronary CTA in the morning. Pt will need meds to beds. If pt unable to discharge by 1:00pm would need to stay until 2/17/23 if possible so she can go straight from hospital to [facility] with a safe ride.      Hessie Severs RN, BSN, [address]  Case Management Department  [phone]

## 2023-02-15 NOTE — PROGRESS NOTES
Patient's heart rate ranging from 104-122. No complaints of chest pain or shortness of breath. Dr. Perri Nowak MD and on call cardiology notified.

## 2023-02-15 NOTE — PROGRESS NOTES
Patient bp=91/60 this nurse notified Nimco Iniguez who ordered to hold the entresto for the evening dose will continue to monitor

## 2023-02-16 ENCOUNTER — APPOINTMENT (OUTPATIENT)
Dept: CT IMAGING | Age: 28
DRG: 871 | End: 2023-02-16
Payer: COMMERCIAL

## 2023-02-16 LAB
ANION GAP SERPL CALCULATED.3IONS-SCNC: 9 MMOL/L (ref 3–16)
BUN BLDV-MCNC: 27 MG/DL (ref 7–20)
CALCIUM SERPL-MCNC: 9.2 MG/DL (ref 8.3–10.6)
CHLORIDE BLD-SCNC: 103 MMOL/L (ref 99–110)
CO2: 28 MMOL/L (ref 21–32)
CREAT SERPL-MCNC: 0.7 MG/DL (ref 0.6–1.1)
GFR SERPL CREATININE-BSD FRML MDRD: >60 ML/MIN/{1.73_M2}
GLUCOSE BLD-MCNC: 102 MG/DL (ref 70–99)
INR BLD: 1.04 (ref 0.87–1.14)
MAGNESIUM: 2.4 MG/DL (ref 1.8–2.4)
POTASSIUM SERPL-SCNC: 4.8 MMOL/L (ref 3.5–5.1)
PROTHROMBIN TIME: 13.5 SEC (ref 11.7–14.5)
SODIUM BLD-SCNC: 140 MMOL/L (ref 136–145)

## 2023-02-16 PROCEDURE — 99452 NTRPROF PH1/NTRNET/EHR RFRL: CPT | Performed by: INTERNAL MEDICINE

## 2023-02-16 PROCEDURE — 75571 CT HRT W/O DYE W/CA TEST: CPT

## 2023-02-16 PROCEDURE — 6370000000 HC RX 637 (ALT 250 FOR IP): Performed by: INTERNAL MEDICINE

## 2023-02-16 PROCEDURE — 2580000003 HC RX 258: Performed by: STUDENT IN AN ORGANIZED HEALTH CARE EDUCATION/TRAINING PROGRAM

## 2023-02-16 PROCEDURE — 80048 BASIC METABOLIC PNL TOTAL CA: CPT

## 2023-02-16 PROCEDURE — 2500000003 HC RX 250 WO HCPCS: Performed by: INTERNAL MEDICINE

## 2023-02-16 PROCEDURE — 6370000000 HC RX 637 (ALT 250 FOR IP): Performed by: NURSE PRACTITIONER

## 2023-02-16 PROCEDURE — 85610 PROTHROMBIN TIME: CPT

## 2023-02-16 PROCEDURE — 83735 ASSAY OF MAGNESIUM: CPT

## 2023-02-16 PROCEDURE — 6370000000 HC RX 637 (ALT 250 FOR IP): Performed by: STUDENT IN AN ORGANIZED HEALTH CARE EDUCATION/TRAINING PROGRAM

## 2023-02-16 PROCEDURE — 36415 COLL VENOUS BLD VENIPUNCTURE: CPT

## 2023-02-16 PROCEDURE — 2060000000 HC ICU INTERMEDIATE R&B

## 2023-02-16 RX ORDER — METOPROLOL SUCCINATE 25 MG/1
25 TABLET, EXTENDED RELEASE ORAL DAILY
Status: DISCONTINUED | OUTPATIENT
Start: 2023-02-16 | End: 2023-02-17 | Stop reason: HOSPADM

## 2023-02-16 RX ORDER — METOPROLOL SUCCINATE 25 MG/1
12.5 TABLET, EXTENDED RELEASE ORAL DAILY
Qty: 30 TABLET | Refills: 3 | Status: SHIPPED | OUTPATIENT
Start: 2023-02-16

## 2023-02-16 RX ORDER — METOPROLOL TARTRATE 5 MG/5ML
5 INJECTION INTRAVENOUS ONCE
Status: COMPLETED | OUTPATIENT
Start: 2023-02-16 | End: 2023-02-16

## 2023-02-16 RX ORDER — LOSARTAN POTASSIUM 25 MG/1
25 TABLET ORAL DAILY
Qty: 30 TABLET | Refills: 3 | Status: SHIPPED | OUTPATIENT
Start: 2023-02-17 | End: 2023-02-17 | Stop reason: HOSPADM

## 2023-02-16 RX ORDER — SPIRONOLACTONE 25 MG/1
12.5 TABLET ORAL DAILY
Qty: 30 TABLET | Refills: 3 | Status: SHIPPED | OUTPATIENT
Start: 2023-02-17 | End: 2023-02-17 | Stop reason: HOSPADM

## 2023-02-16 RX ORDER — SPIRONOLACTONE 25 MG/1
12.5 TABLET ORAL DAILY
Status: DISCONTINUED | OUTPATIENT
Start: 2023-02-17 | End: 2023-02-17

## 2023-02-16 RX ORDER — FUROSEMIDE 40 MG/1
40 TABLET ORAL DAILY
Qty: 60 TABLET | Refills: 3 | Status: SHIPPED | OUTPATIENT
Start: 2023-02-17 | End: 2023-02-17 | Stop reason: SDUPTHER

## 2023-02-16 RX ADMIN — LURASIDONE HYDROCHLORIDE 20 MG: 40 TABLET, FILM COATED ORAL at 09:50

## 2023-02-16 RX ADMIN — SODIUM CHLORIDE, PRESERVATIVE FREE 10 ML: 5 INJECTION INTRAVENOUS at 09:17

## 2023-02-16 RX ADMIN — SPIRONOLACTONE 25 MG: 25 TABLET, FILM COATED ORAL at 09:15

## 2023-02-16 RX ADMIN — VORTIOXETINE 5 MG: 10 TABLET, FILM COATED ORAL at 09:15

## 2023-02-16 RX ADMIN — Medication 10 MG: at 20:15

## 2023-02-16 RX ADMIN — SODIUM CHLORIDE, PRESERVATIVE FREE 10 ML: 5 INJECTION INTRAVENOUS at 20:15

## 2023-02-16 RX ADMIN — METOPROLOL TARTRATE 5 MG: 1 INJECTION, SOLUTION INTRAVENOUS at 09:50

## 2023-02-16 RX ADMIN — Medication 10 MG: at 00:23

## 2023-02-16 RX ADMIN — FUROSEMIDE 40 MG: 40 TABLET ORAL at 09:15

## 2023-02-16 NOTE — PROGRESS NOTES
EMR was reviewed. There were no overnight events. Creatinine 1.7, K4.8.  BP low normal therefore meds were held this morning. She remains off supplemental oxygen. She is scheduled for coronary CTA later today. Assessment / Plan:      1. Acute and new HFrEF. Patient presented with volume overload/acute heart failure. She has severe systolic dysfunction. This could be ischemic in etiology (CAD, patient with an episode of severe chest pain couple of weeks ago, positive for amphetamines - high risk for plaque rupture) versus (more likely) nonischemic cardiomyopathy (cocaine and amphetamines, prior alcohol use). He is now euvolemic and hemodynamically tenuous.     - Plan for coronary CTA today (hopefully rate better controlled); will give lopressor 5 mg iv x 1 now for rate. - In view of patient's financial condition and for better compliance, will place her on affordable medications for heart failure. - Cw losartan 25 mg daily due to low Bps, cost and compliance. - Will decrease Toprol to 25 mg daily.   - Decrease ryan 12.5 daily  - Cw lasix 40 daily  - Will not start Jardiance this admission due to low BP and cost of medication; will plan to consider it as outpatient (will discuss at that time). - If LVEF remains < 35% after 90 days of GDMT, will discuss referral to EP for possible device placement. In the meantime, patient will need a LifeVest (as outpatient). - Strict I's and O's every shift and standing weights if possible, low-salt diet, daily BMP with reflex to Mg (correct lytes for goals K >4.0 and Mg > 2.0) and wean supplemental oxygen to off (or down to baseline supplemental oxygen requirements) for sats greater than 92-94%. 2.  Multi-substance abuse:  I had an extensive discussion with patient explaining the risks of substance abuse especially for ACS and HF.      -Patient will be discharged to a drug rehab facility.       3. Acute liver injury:   I suspect multifactorial (hepatitis B and C infection, liver congestion from acute heart failure). Liver enzymes are now improving.    - Treatment per #1 for heart failure  - Hepatitis infection to be treated per primary team.     4. Acute pyelonephritis:   On antibiotics per primary team.       If coronary CTA does not reveal any significant coronary artery disease, patient can be discharged later today to the drug rehab facility without any further testing on the current heart failure medications. and follow up with me in 1 week with a BMP prior to visit. Please call me with any questions. I would like to thank you for providing me the opportunity to participate in the care of your patient. If you have any questions, please do not hesitate to contact me.      Rayshawn Burgos MD, [facility], [address]  The [address]  [facility] [place] [address] [place] [address]  Ph: [phone]  Fax: [phone]

## 2023-02-16 NOTE — PROGRESS NOTES
Hospitalist Progress Note      PCP: No primary care provider on file. Date of Admission: 2/3/2023    Chief Complaint: Abdominal pain, nausea and vomiting      Subjective: Chart reviewed. Patient agitated, wanting to take her Latuda in the morning instead of evening  Comfortable  Medications:  Reviewed    Infusion Medications    sodium chloride      sodium chloride 25 mL (02/12/23 5738)     Scheduled Medications    [START ON 2/17/2023] spironolactone  12.5 mg Oral Daily    metoprolol succinate  25 mg Oral Daily    lurasidone  20 mg Oral Daily    VORTIoxetine  5 mg Oral Daily    losartan  25 mg Oral Daily    furosemide  40 mg Oral Daily    magnesium sulfate  4,000 mg IntraVENous Once    enoxaparin  40 mg SubCUTAneous Daily    melatonin  10 mg Oral Nightly    acetaminophen  650 mg Oral Once    lidocaine 1 % injection  5 mL IntraDERmal Once    sodium chloride flush  5-40 mL IntraVENous 2 times per day    polyethylene glycol  17 g Oral Daily    sodium chloride flush  10 mL IntraVENous 2 times per day    nicotine  1 patch TransDERmal Daily     PRN Meds: iopamidol, aluminum & magnesium hydroxide-simethicone, sodium chloride flush, sodium chloride, docusate sodium, perflutren lipid microspheres, LORazepam, hydrOXYzine HCl, sodium chloride flush, sodium chloride, potassium chloride, magnesium sulfate, promethazine **OR** ondansetron      Intake/Output Summary (Last 24 hours) at 2/16/2023 1839  Last data filed at 2/16/2023 1822  Gross per 24 hour   Intake 1690 ml   Output 2450 ml   Net -760 ml       Physical Exam Performed:    BP 94/63   Pulse 97   Temp 98 °F (36.7 °C) (Oral)   Resp 18   Ht 5' 3\" (1.6 m)   Wt 137 lb 5.6 oz (62.3 kg)   SpO2 100%   BMI 24.33 kg/m²     General appearance: No apparent distress, appears stated age and cooperative. HEENT: Pupils equal, round, and reactive to light. Conjunctivae/corneas clear. Neck: Supple, with full range of motion. No jugular venous distention.  Trachea midline. Respiratory: Occasional basal crackles  Cardiovascular: Regular rate and rhythm with normal S1/S2 without murmurs, rubs or gallops. Abdomen: Soft, non-tender, non-distended with normal bowel sounds. Musculoskeletal: Lower extremity edema  Skin: Skin color, texture, turgor normal.  No rashes or lesions. Neurologic: Awake oriented x3      Labs:   Recent Labs     02/14/23  0350   WBC 9.9   HGB 11.6*   HCT 36.8        Recent Labs     02/14/23  0350 02/16/23  0510    140   K 3.4* 4.8   CL 94* 103   CO2 33* 28   BUN 18 27*   CREATININE 0.8 0.7   CALCIUM 8.4 9.2     Recent Labs     02/14/23  0350   *   *   BILIDIR <0.2   BILITOT 0.4   ALKPHOS 118     Recent Labs     02/16/23  0510   INR 1.04     No results for input(s): Adonica Hoose in the last 72 hours. Urinalysis:      Lab Results   Component Value Date/Time    NITRU POSITIVE 02/03/2023 07:49 PM    WBCUA 21-50 02/03/2023 07:49 PM    BACTERIA 4+ 02/03/2023 07:49 PM    RBCUA 11-20 02/03/2023 07:49 PM    RBCUA 9 08/10/2021 01:51 PM    BLOODU LARGE 02/03/2023 07:49 PM    SPECGRAV >=1.030 02/03/2023 07:49 PM    GLUCOSEU Negative 02/03/2023 07:49 PM       Radiology:  CT CARDIAC CALCIUM SCORING   Final Result   1. The total calcium score is 0. This implies that there is no identifiable plaque. The risk of coronary disease is very low, generally less than 5%. 2. Borderline cardiomegaly with small pericardial effusion. CT CHEST ABDOMEN PELVIS W CONTRAST Additional Contrast? None   Final Result      1. There is edema identified within the fat planes of the neck as well as within the subcutaneous fat about the thorax. There is also edema within the superior mediastinum. Correlate for anasarca-third spacing. 2. Large layering right-sided pleural effusion is increased from the prior study. There is a small layering left pleural effusion which is mildly increased from the prior study.    3. There is some compressive atelectasis within dependent portion of right lower lobe. Linear atelectasis is present with the right middle lobe and lingula. 4. There are multiple venous collaterals identified about the left shoulder girdle. This is nonspecific. The SVC, left innominate vein, left subclavian vein and left axillary vein are patent. 5. There is some enlargement of the left ventricle, right ventricle and right atrium. Correlate for cardiomyopathy. This could be better evaluated with echocardiogram.      ABDOMEN AND PELVIS: There is subcutaneous edema identified about the abdomen and pelvis. Note should be made that the inferior pelvis was not included within the field-of-view. There is a small amount of free fluid identified with the right paracolic gutter, Morison's pouch as well as the pelvic cul-de-sac. Liver attenuation is decreased. There is some gallbladder wall edema. Pancreas, spleen and adrenal glands are normal. Renal size and enhancement is normal.      No bowel dilatation or bowel wall thickening is identified. Note should be made that some small bowel loops as well as the cecum were not entirely included within the field-of-view due to incomplete imaging through the pelvis. No adnexal mass is identified. No lymphadenopathy is identified. IMPRESSION:      1. Subcutaneous edema is identified about the abdomen and pelvis. This can be seen with anasarca-third spacing. 2. There is gallbladder wall edema. This is nonspecific and may be related to volume overload. Cholecystitis cannot entirely be excluded. 3. Liver attenuation is decreased. This may reflect hepatic steatosis or hepatitis. Correlate with liver function tests. 4. Small amount of free fluid is identified within the abdomen and pelvis. CT HEAD WO CONTRAST   Final Result   1. No acute intracranial process. CT ABDOMEN PELVIS WO CONTRAST Additional Contrast? None   Final Result      1.  Small to moderate right pleural effusion and small left pleural effusion with subsegmental atelectasis. No visible pneumonia. 2. Mild mesenteric and retroperitoneal edema, trace ascites and periportal edema. Hepatocellular disease is a possibility. Correlate with liver function tests. XR CHEST (2 VW)   Final Result      No evidence for acute cardiopulmonary disease. IP CONSULT TO SOCIAL WORK  IP CONSULT TO PSYCHIATRY  IP CONSULT TO GI  IP CONSULT TO CRITICAL CARE  IP CONSULT TO CARDIOLOGY  IP CONSULT TO PSYCHIATRY  IP CONSULT TO CARDIOLOGY    Assessment/Plan:    Active Hospital Problems    Diagnosis     Hypotension due to drugs [I95.2]      Priority: Medium    Tachycardia [R00.0]      Priority: Medium    Elevated LFTs [R79.89]      Priority: Medium    Acute HFrEF (heart failure with reduced ejection fraction) (HCC) [I50.21]      Priority: Medium    Primary hypertension [I10]      Priority: Medium    Acute hepatitis B [B16.9]      Priority: Medium    Bipolar affective disorder, current episode manic ([facility] [address] [F31.10]      Priority: Medium    Pyelonephritis [N12]      Priority: Medium    Acute pyelonephritis [N10]      Priority: Medium     1. Acute systolic heart failure likely due to illicit drug use and stress induced cardiomyopathy.  -Continue Lasix drips for now consider to change to oral in the next 1 to 2 days.  -Continue Entresto  -Monitor intake output and daily body weight  -Switch to regular diet but with low sodium. Rambo Hidalgo started today, cardiology input noticed  I was notified by RN that cardiology planning testing  Confirmed with the  patient Rambo Hidalgo and Peterson Memos is covered but cardiology wants to change back to Toprol losartan spironolactone Lasix, calcium scoring is 0.    2.  Severe transaminitis likely due to viral hepatitis B and C and possible liver ischemia due to severe heart failure. -LFTs are trending downward. Continue to monitor and avoid hepatotoxic medication.   -GI recommended reassessment of hepatitis B status and potential for hepatitis C re-treatment as outpatient GI recommendations noticed will need to check H BV DNA levels in 3 months to confirm regarding chronic hepatitis B status before considering treatment it can be early infection patient did had a history of hepatitis C treated with Catheline Kins about 2 years back      3. Acute pyelonephritis s/p treatment this admission    4. History of substance abuse. Urine drug screen was positive for amphetamine. 5.  History of bipolar disorder with history of amphetamine use  Started on [place] and Trintellix, psych input noticed  -    6. Acute metabolic encephalopathy likely due to severe liver disease, UTI. Resolved. DVT Prophylaxis: Lovenox  Diet: ADULT DIET; Regular;  Low Sodium (2 gm)  Code Status: Full Code  PT/OT Eval Status: No need    Dispo -discharge order placed calcium scoring 0    Indira Camp MD

## 2023-02-16 NOTE — PLAN OF CARE
Problem: Discharge Planning  Goal: Discharge to home or other facility with appropriate resources  Outcome: Progressing  Flowsheets (Taken 2/15/2023 1858)  Discharge to home or other facility with appropriate resources:   Identify barriers to discharge with patient and caregiver   Arrange for needed discharge resources and transportation as appropriate   Refer to discharge planning if patient needs post-hospital services based on physician order or complex needs related to functional status, cognitive ability or social support system   Identify discharge learning needs (meds, wound care, etc)     Problem: Safety - Adult  Goal: Free from fall injury  Outcome: Progressing  Flowsheets (Taken 2/15/2023 1858)  Free From Fall Injury:   Instruct family/caregiver on patient safety   Based on caregiver fall risk screen, instruct family/caregiver to ask for assistance with transferring infant if caregiver noted to have fall risk factors  Note: Patient's bed in lowest position, bed brakes locked, bed alarm in placed, call light within reach, and encouraged to call for assistance. Will continue to monitor. Problem: ABCDS Injury Assessment  Goal: Absence of physical injury  Outcome: Progressing  Flowsheets (Taken 2/15/2023 1858)  Absence of Physical Injury: Implement safety measures based on patient assessment     Problem: Pain  Goal: Verbalizes/displays adequate comfort level or baseline comfort level  Outcome: Progressing  Flowsheets (Taken 2/15/2023 1858)  Verbalizes/displays adequate comfort level or baseline comfort level:   Encourage patient to monitor pain and request assistance   Assess pain using appropriate pain scale   Administer analgesics based on type and severity of pain and evaluate response   Implement non-pharmacological measures as appropriate and evaluate response   Notify Licensed Independent Practitioner if interventions unsuccessful or patient reports new pain  Note: Patient states, \"no pain\".  Will continue to monitor and assess. Problem: Chronic Conditions and Co-morbidities  Goal: Patient's chronic conditions and co-morbidity symptoms are monitored and maintained or improved  Outcome: Progressing  Flowsheets (Taken 2/15/2023 1858)  Care Plan - Patient's Chronic Conditions and Co-Morbidity Symptoms are Monitored and Maintained or Improved:   Monitor and assess patient's chronic conditions and comorbid symptoms for stability, deterioration, or improvement   Collaborate with multidisciplinary team to address chronic and comorbid conditions and prevent exacerbation or deterioration   Update acute care plan with appropriate goals if chronic or comorbid symptoms are exacerbated and prevent overall improvement and discharge     Problem: Respiratory - Adult  Goal: Achieves optimal ventilation and oxygenation  Outcome: Progressing  Flowsheets (Taken 2/15/2023 1858)  Achieves optimal ventilation and oxygenation:   Assess for changes in respiratory status   Assess for changes in mentation and behavior   Oxygen supplementation based on oxygen saturation or arterial blood gases   Position to facilitate oxygenation and minimize respiratory effort  Note: Patient on room air SpO2 100%. Will continue to monitor and assess. Problem: Cardiovascular - Adult  Goal: Maintains optimal cardiac output and hemodynamic stability  Outcome: Progressing  Flowsheets (Taken 2/15/2023 1900)  Maintains optimal cardiac output and hemodynamic stability:   Monitor blood pressure and heart rate   Monitor urine output and notify Licensed Independent Practitioner for values outside of normal range   Assess for signs of decreased cardiac output   Administer fluid and/or volume expanders as ordered   Administer vasoactive medications as ordered  Note: Patient heart rate sinus tachy heart rate 100-122. MD notified. Administered metoprolol as ordered. Will continue to monitor and assess.        Problem: Cardiovascular - Adult  Goal: Absence of cardiac dysrhythmias or at baseline  Outcome: Progressing  Flowsheets (Taken 2/15/2023 1900)  Absence of cardiac dysrhythmias or at baseline:   Monitor cardiac rate and rhythm   Assess for signs of decreased cardiac output   Administer antiarrhythmia medication and electrolyte replacement as ordered  Note: Patient heart rate sinus tachy heart rate 100-122. MD notified. Administered metoprolol as ordered. Will continue to monitor and assess. Problem: Metabolic/Fluid and Electrolytes - Adult  Goal: Electrolytes maintained within normal limits  Outcome: Progressing  Flowsheets (Taken 2/15/2023 1900)  Electrolytes maintained within normal limits:   Monitor labs and assess patient for signs and symptoms of electrolyte imbalances   Administer electrolyte replacement as ordered   Monitor response to electrolyte replacements, including repeat lab results as appropriate   Instruct patient on fluid and nutrition restrictions as appropriate  Note: Patient's potassium level 3.4 and notified MD. Administered PO potassium as ordered.

## 2023-02-16 NOTE — PROGRESS NOTES
Comprehensive Nutrition Assessment    RECOMMENDATIONS:  PO Diet: Regular, Low Sodium  ONS: n/a  Nutrition Education: No recommendation at this time       NUTRITION ASSESSMENT:   Nutritional summary & status: Follow-up: Pt is on a Regular; low sodium diet. Meal intake adequate at %. ONS not indicated at present.  lbs, down 36 lbs since previous assessment. Noted -7. 17L since admit. Will continue to monitor and if po intake decreased to consistently  <50% of meals, will offer ONS. Plans to discharge to rehab facility. Continue to follow throughout stay. Admission/PMH: Acute pyelonephritis, CHF, HTN, Hep B    MALNUTRITION ASSESSMENT  Context of Malnutrition: Acute Illness   Malnutrition Status: At risk for malnutrition (Comment)      NUTRITION DIAGNOSIS   In context of social or environmental circumstances related to lack or limited access to food as evidenced by poor intake prior to admission    Nutrition Monitoring and Evaluation:   Food/Nutrient Intake Outcomes:  Food and Nutrient Intake  Physical Signs/Symptoms Outcomes:  Biochemical Data, Nutrition Focused Physical Findings, Weight     OBJECTIVE DATA: Significant to nutrition assessment  Nutrition Related Findings: glu 102 LBM 2/13 BLE trace non- pitting edema. Wounds: None  Nutrition Goals: PO intake 75% or greater, prior to discharge     [facility] DIET; Regular; Low Sodium (2 gm)  PO Intake: %   PO Supplement Intake:None Ordered  Additional Sources of Calories/IVF:n/a     ANTHROPOMETRICS  Current Height: 5' 3\" (160 cm)  Current Weight: 137 lb 5.6 oz (62.3 kg)    Admission weight: 130 lb (59 kg)  Ideal Body Weight (IBW): 115 lbs  (52 kg)    Usual Bodyweight     BMI: 24.4    COMPARATIVE STANDARDS  Energy (kcal):  5035-3066 (25-30 kcal/CBW/62 kg)     Protein (g):  74-93 (1.2-1.5 gm/CBW/62.3 kg)       Fluid (mL/day):  1 mL/kcal or per MD    The patient will be monitored per nutrition standards of care.  Consult dietitian if additional nutrition interventions are needed prior to RD reassessment.      Brennan hCun, [address]:  [phone]  Office:  [phone]

## 2023-02-16 NOTE — PLAN OF CARE
Problem: Safety - Adult  Goal: Free from fall injury  2/15/2023 2218 by Paloma Phelps RN  Note: Patient remained free from falls overnight. Calls appropriately for assist. Bed alarm remains active, bed in low/locked position, non-skid footwear in place, and call light in reach. Will continue fall risk protocol. Paloma Phelps RN 2/15/2023        Problem: Pain  Goal: Verbalizes/displays adequate comfort level or baseline comfort level  2/15/2023 2218 by Paloma Phelps RN  Note: Denies pain and appeared to rest comfortably in bed overnight. Will continue to monitor. Paloma Phelps RN 2/15/2023      Problem: Cardiovascular - Adult  Goal: Maintains optimal cardiac output and hemodynamic stability  2/15/2023 2218 by Paloma Phelps RN  Note: Patient remains tachycardic 100-110s at rest, with activity increases up to 130s. Remaining vitals stable overnight. Will continue to monitor.     Paloma Phelps RN 2/15/2023

## 2023-02-16 NOTE — CARE COORDINATION
CM continues to follow for DC planning and needs. CM spoke with OP Pharmacy, they have the patients meds to beds ready for patient to  at DC. There is $0 cost for the patient. Patient is going to go to OP rehab at [facility], Gina Aguirre with [facility] can assist with getting patient safe ride to rehab at [facility]. Patient needs take her medications with her to [facility] when she goes and needs to DC BEFORE 1pm. CM will contact [address] [place] [address] once patient ready.      Thank you,  Kelsey Yusuf RN,   4th Floor Progressive Care Unit  [phone]

## 2023-02-17 VITALS
SYSTOLIC BLOOD PRESSURE: 107 MMHG | WEIGHT: 136.91 LBS | BODY MASS INDEX: 24.26 KG/M2 | HEIGHT: 63 IN | OXYGEN SATURATION: 96 % | RESPIRATION RATE: 16 BRPM | TEMPERATURE: 97.8 F | DIASTOLIC BLOOD PRESSURE: 75 MMHG | HEART RATE: 98 BPM

## 2023-02-17 LAB
ANION GAP SERPL CALCULATED.3IONS-SCNC: 7 MMOL/L (ref 3–16)
BUN BLDV-MCNC: 29 MG/DL (ref 7–20)
CALCIUM SERPL-MCNC: 9.2 MG/DL (ref 8.3–10.6)
CHLORIDE BLD-SCNC: 101 MMOL/L (ref 99–110)
CO2: 29 MMOL/L (ref 21–32)
CREAT SERPL-MCNC: 1 MG/DL (ref 0.6–1.1)
GFR SERPL CREATININE-BSD FRML MDRD: >60 ML/MIN/{1.73_M2}
GLUCOSE BLD-MCNC: 91 MG/DL (ref 70–99)
POTASSIUM REFLEX MAGNESIUM: 5 MMOL/L (ref 3.5–5.1)
SODIUM BLD-SCNC: 137 MMOL/L (ref 136–145)

## 2023-02-17 PROCEDURE — 6370000000 HC RX 637 (ALT 250 FOR IP): Performed by: NURSE PRACTITIONER

## 2023-02-17 PROCEDURE — 6370000000 HC RX 637 (ALT 250 FOR IP): Performed by: INTERNAL MEDICINE

## 2023-02-17 PROCEDURE — 36415 COLL VENOUS BLD VENIPUNCTURE: CPT

## 2023-02-17 PROCEDURE — 80048 BASIC METABOLIC PNL TOTAL CA: CPT

## 2023-02-17 PROCEDURE — 2580000003 HC RX 258: Performed by: STUDENT IN AN ORGANIZED HEALTH CARE EDUCATION/TRAINING PROGRAM

## 2023-02-17 PROCEDURE — 99452 NTRPROF PH1/NTRNET/EHR RFRL: CPT | Performed by: INTERNAL MEDICINE

## 2023-02-17 PROCEDURE — 6370000000 HC RX 637 (ALT 250 FOR IP): Performed by: STUDENT IN AN ORGANIZED HEALTH CARE EDUCATION/TRAINING PROGRAM

## 2023-02-17 RX ORDER — FUROSEMIDE 20 MG/1
20 TABLET ORAL DAILY
Status: DISCONTINUED | OUTPATIENT
Start: 2023-02-18 | End: 2023-02-17

## 2023-02-17 RX ORDER — LOSARTAN POTASSIUM 25 MG/1
12.5 TABLET ORAL DAILY
Status: DISCONTINUED | OUTPATIENT
Start: 2023-02-18 | End: 2023-02-17

## 2023-02-17 RX ORDER — FUROSEMIDE 20 MG/1
20 TABLET ORAL DAILY
Status: DISCONTINUED | OUTPATIENT
Start: 2023-02-17 | End: 2023-02-17 | Stop reason: HOSPADM

## 2023-02-17 RX ORDER — LOSARTAN POTASSIUM 25 MG/1
12.5 TABLET ORAL DAILY
Status: DISCONTINUED | OUTPATIENT
Start: 2023-02-17 | End: 2023-02-17 | Stop reason: HOSPADM

## 2023-02-17 RX ORDER — FUROSEMIDE 40 MG/1
20 TABLET ORAL DAILY
Qty: 60 TABLET | Refills: 3 | Status: SHIPPED | OUTPATIENT
Start: 2023-02-17

## 2023-02-17 RX ORDER — ALBUTEROL SULFATE 90 UG/1
2 AEROSOL, METERED RESPIRATORY (INHALATION) 4 TIMES DAILY PRN
Qty: 54 G | Refills: 1 | Status: SHIPPED | OUTPATIENT
Start: 2023-02-17

## 2023-02-17 RX ORDER — LOSARTAN POTASSIUM 25 MG/1
12.5 TABLET ORAL DAILY
Qty: 30 TABLET | Refills: 3 | Status: SHIPPED | OUTPATIENT
Start: 2023-02-18

## 2023-02-17 RX ORDER — M-VIT,TX,IRON,MINS/CALC/FOLIC 27MG-0.4MG
1 TABLET ORAL DAILY
Qty: 30 TABLET | Refills: 11 | Status: SHIPPED | OUTPATIENT
Start: 2023-02-17 | End: 2024-02-17

## 2023-02-17 RX ADMIN — VORTIOXETINE 5 MG: 10 TABLET, FILM COATED ORAL at 09:33

## 2023-02-17 RX ADMIN — LURASIDONE HYDROCHLORIDE 20 MG: 40 TABLET, FILM COATED ORAL at 09:30

## 2023-02-17 RX ADMIN — SODIUM CHLORIDE, PRESERVATIVE FREE 10 ML: 5 INJECTION INTRAVENOUS at 09:29

## 2023-02-17 RX ADMIN — METOPROLOL SUCCINATE 25 MG: 25 TABLET, FILM COATED, EXTENDED RELEASE ORAL at 09:32

## 2023-02-17 RX ADMIN — LOSARTAN POTASSIUM 12.5 MG: 25 TABLET, FILM COATED ORAL at 09:28

## 2023-02-17 RX ADMIN — FUROSEMIDE 20 MG: 20 TABLET ORAL at 09:28

## 2023-02-17 ASSESSMENT — PAIN SCALES - GENERAL
PAINLEVEL_OUTOF10: 0
PAINLEVEL_OUTOF10: 0

## 2023-02-17 ASSESSMENT — PAIN SCALES - WONG BAKER: WONGBAKER_NUMERICALRESPONSE: 0

## 2023-02-17 NOTE — PROGRESS NOTES
Discharge order received per Dr. Cole Ahuja. Meds filled via meds to beds and delivered to patient in room. Reviewed discharge paperwork with pt. Instructed her to follow up with cardiology at her scheduled appt on 2/21. She verbalized understanding. RN spoke with Gerald Griggs from cardiology who confirmed pt will get lifevest as outpatient. Reviewed all medications with pt, she verbalized understanding. CHF education included. Tele and IV dc'd. Pt left floor via wheelchair.   Cyndia Duane, RN  2/17/2023  5:03 PM

## 2023-02-17 NOTE — DISCHARGE SUMMARY
HOSPITALISTS DISCHARGE SUMMARY    Patient Demographics    Patient. Rj Marker  Date of Birth. 1995  MRN. [id number]     Primary care provider. No primary care provider on file. (Tel: None)    Admit date: 2/3/2023    Discharge date (blank if same as Note Date): Note Date: 2/17/2023     Reason for Hospitalization. Chief Complaint   Patient presents with    Abdominal Pain    Back Pain         Significant Findings. Principal Problem:    Acute pyelonephritis  Active Problems:    Pyelonephritis    Acute HFrEF (heart failure with reduced ejection fraction) (HCC)    Primary hypertension    Acute hepatitis B    Bipolar affective disorder, current episode manic (HCC)    Hypotension due to drugs    Tachycardia    Elevated LFTs  Resolved Problems:    * No resolved hospital problems. *       Problems and results from this hospitalization that need follow up. Cardiomyopathy   Elevated LFTs  Bipolar    Significant test results and incidental findings. CT CARDIAC CALCIUM SCORING   Final Result   1. The total calcium score is 0. This implies that there is no identifiable plaque. The risk of coronary disease is very low, generally less than 5%. 2. Borderline cardiomegaly with small pericardial effusion. CT CHEST ABDOMEN PELVIS W CONTRAST Additional Contrast? None   Final Result      1. There is edema identified within the fat planes of the neck as well as within the subcutaneous fat about the thorax. There is also edema within the superior mediastinum. Correlate for anasarca-third spacing. 2. Large layering right-sided pleural effusion is increased from the prior study. There is a small layering left pleural effusion which is mildly increased from the prior study. 3. There is some compressive atelectasis within dependent portion of right lower lobe. Linear atelectasis is present with the right middle lobe and lingula.    4. There are multiple venous collaterals identified about the left shoulder girdle. This is nonspecific. The SVC, left innominate vein, left subclavian vein and left axillary vein are patent. 5. There is some enlargement of the left ventricle, right ventricle and right atrium. Correlate for cardiomyopathy. This could be better evaluated with echocardiogram.      ABDOMEN AND PELVIS: There is subcutaneous edema identified about the abdomen and pelvis. Note should be made that the inferior pelvis was not included within the field-of-view. There is a small amount of free fluid identified with the right paracolic gutter, Morison's pouch as well as the pelvic cul-de-sac. Liver attenuation is decreased. There is some gallbladder wall edema. Pancreas, spleen and adrenal glands are normal. Renal size and enhancement is normal.      No bowel dilatation or bowel wall thickening is identified. Note should be made that some small bowel loops as well as the cecum were not entirely included within the field-of-view due to incomplete imaging through the pelvis. No adnexal mass is identified. No lymphadenopathy is identified. IMPRESSION:      1. Subcutaneous edema is identified about the abdomen and pelvis. This can be seen with anasarca-third spacing. 2. There is gallbladder wall edema. This is nonspecific and may be related to volume overload. Cholecystitis cannot entirely be excluded. 3. Liver attenuation is decreased. This may reflect hepatic steatosis or hepatitis. Correlate with liver function tests. 4. Small amount of free fluid is identified within the abdomen and pelvis. CT HEAD WO CONTRAST   Final Result   1. No acute intracranial process. CT ABDOMEN PELVIS WO CONTRAST Additional Contrast? None   Final Result      1. Small to moderate right pleural effusion and small left pleural effusion with subsegmental atelectasis. No visible pneumonia. 2. Mild mesenteric and retroperitoneal edema, trace ascites and periportal edema.  Hepatocellular disease is a possibility. Correlate with liver function tests. XR CHEST (2 VW)   Final Result      No evidence for acute cardiopulmonary disease. Invasive procedures and treatments. [facility] Course. Patient did have a prolonged stay in the hospital patient was admitted on February. Patient was initially admitted for abdominal pain and back pain. Initially was thought to have possible pyelonephritis also concerns of pleural effusion on CT. Screen was positive for amphetamine. Patient did had abnormal hepatitis B screen. Patient was refusing labs during initial part of the stay. Patient also homeless  Patient did had a worsening of LFTs. Mild mental status changes. Patient was transferred to ICU on eighth. An echocardiogram which did show low EF which is new for the patient. Patient is started getting better after diuresis. LFTs also started getting better. Patient was thought to have biventricular heart failure. Patient has been started on heart failure medicines for heart new onset acute systolic heart failure. Please see the list below. Cardiology did order calcium scoring test which was 0. Patient did follow with cardiology as outpatient as if EF does not improve with medicines she might be candidate for ICD. Elevated LFTs seem like combination of passive liver congestion along with possible D/C infection. GI recommended reassessment of hepatitis B status and potential for hepatitis C re-treatment as outpatient GI recommendations noticed will need to check H BV DNA levels in 3 months to confirm regarding chronic hepatitis B status before considering treatment it can be early infection patient did had a history of hepatitis C treated with Catheline Kins about 2 years back    Does have a history of bipolar disorder not on any psych medicines since December. I did reconsult psych.   After discussion with the psych we will be discharging the patient on Althea Abrams which she was taking at home earlier and also on low-dose of Trintellix. Patient is also accepted at outpatient rehab. Patient need to establish with the outpatient psychiatric provider as outpatient. Consults. IP CONSULT TO SOCIAL WORK  IP CONSULT TO PSYCHIATRY  IP CONSULT TO GI  IP CONSULT TO CRITICAL CARE  IP CONSULT TO CARDIOLOGY  IP CONSULT TO PSYCHIATRY  IP CONSULT TO CARDIOLOGY    Physical examination on discharge day. /76   Pulse (!) 103   Temp 98 °F (36.7 °C) (Oral)   Resp 16   Ht 5' 3\" (1.6 m)   Wt 136 lb 14.5 oz (62.1 kg)   SpO2 99%   BMI 24.25 kg/m²   General appearance. Alert. Looks comfortable. HEENT. Sclera clear. Moist mucus membranes. Cardiovascular. Regular rate and rhythm, normal S1, S2. No murmur. Respiratory. Not using accessory muscles. Clear to auscultation bilaterally, no wheeze. Gastrointestinal. Abdomen soft, non-tender, not distended, normal bowel sounds  Neurology. Facial symmetry. No speech deficits. Moving all extremities equally. Extremities. No edema in lower extremities. Skin. Warm, dry, normal turgor        Condition at time of discharge stable    Medication instructions provided to patient at discharge.      Medication List        START taking these medications      albuterol sulfate  (90 Base) MCG/ACT inhaler  Commonly known as: Ventolin HFA  Inhale 2 puffs into the lungs 4 times daily as needed for Wheezing     furosemide 40 MG tablet  Commonly known as: LASIX  Take 0.5 tablets by mouth daily     losartan 25 MG tablet  Commonly known as: COZAAR  Take 0.5 tablets by mouth daily  Start taking on: February 18, 2023     lurasidone 40 MG Tabs tablet  Commonly known as: LATUDA  Take 0.5 tablets by mouth nightly     metoprolol succinate 25 MG extended release tablet  Commonly known as: TOPROL XL  Take 0.5 tablets by mouth daily     therapeutic multivitamin-minerals tablet  Take 1 tablet by mouth daily     VORTIoxetine 5 MG tablet  Commonly known as: TRINTELLIX  Take 1 tablet by mouth daily               Where to Get Your Medications        These medications were sent to [place], [address] [place] [phone] - F [phone] [address] [facility] [address]      Phone: [phone]   albuterol sulfate  (90 Base) MCG/ACT inhaler  furosemide 40 MG tablet  losartan 25 MG tablet  lurasidone 40 MG Tabs tablet  metoprolol succinate 25 MG extended release tablet  therapeutic multivitamin-minerals tablet  VORTIoxetine 5 MG tablet            Discharge recommendations given to patient. Follow Up. Primary care provider GI cardiology psychiatry  Disposition. home  Activity. activity as tolerated  Diet: ADULT DIET; Regular; Low Sodium (2 gm)      Spent 40 minutes in discharge process.     Signed:  Ko Torres MD     2/17/2023 10:53 AM

## 2023-02-17 NOTE — PLAN OF CARE
Problem: Discharge Planning  Goal: Discharge to home or other facility with appropriate resources  Outcome: Progressing  Flowsheets (Taken 2/16/2023 0913 by Boubacar Harley, RN)  Discharge to home or other facility with appropriate resources: Identify barriers to discharge with patient and caregiver     Problem: Safety - Adult  Goal: Free from fall injury  Outcome: Progressing  Flowsheets (Taken 2/16/2023 0913 by Boubacar Harley, RN)  Free From Fall Injury: Instruct family/caregiver on patient safety     Problem: ABCDS Injury Assessment  Goal: Absence of physical injury  Outcome: Progressing  Flowsheets (Taken 2/16/2023 0913 by Boubacar Harley, RN)  Absence of Physical Injury: Implement safety measures based on patient assessment

## 2023-02-17 NOTE — PROGRESS NOTES
Cardiology Consultation                                                                      Pt Name: Yady Hamilton  Age: 28 y.o. Sex: female  : 1995  Location: [facility]/7706-90     Referring Physician: Zuleyka Huddleston MD  Primary cardiologist: Dr Ronnell Gonzales / Dr Soren Sterling        Reason for Consult:      Reason for Consultation/Chief Complaint: new and acute HFrEF     HPI:       Yady Hamilton is a 28 y.o. female with a past medical history of IV drug use (amphetamines) and crack cocaine, past heavy alcohol use (quit 2022), HBV, HCV, bipolar disorder, HTN. Patient presented to the emergency room on 2/3 with abdominal and back pain, shortness of breath, nausea vomiting, fevers. She was admitted for acute systolic heart failure, acute liver injury, acute pyelonephritis. Echo 2023: Mild LVD (LVEDD 6.1 cm), EF 47-16%, grade 2 diastolic dysfunction with increased filling pressures, normal RV size with mild RV dysfunction, moderate MR, moderate TR, RVSP 48 (15). ECG consistent with sinus tachycardia, nonspecific changes. CT chest/abdomen/pelvis with contrast 23: Anasarca, large right-sided pleural effusion, enlargement of the left ventricle, right ventricle, right atrium     UDS positive for amphetamines. Cardiology was consulted, Dr. Ronnell Gonzales originally saw patient. She was diuresed successfully and was placed on heart failure medications. I was consulted today to assess patient for new onset of severe systolic heart failure. Patient reports an episode of severe midsternal chest pain with dyspnea that happened in the last week of 2023. She claims symptoms lasted all day. Otherwise she denies any previous cardiac history. Currently reports improved symptoms, she is at her baseline. She is planning to be transferred to a rehab facility for drug rehabilitation. CT calcium score 23: Total Agatston score of 0. Interval History:   EMR was reviewed.   There were no overnight events. I's and O's -1 L, weight down 1 pound. Creatinine up at 1.0 from 0.7, K increasing 5.0. Metoprolol and losartan were held due to low BP. Assessment / Plan:      1. Acute and new HFrEF. Patient presented with volume overload/acute heart failure. She has severe systolic dysfunction. This could be ischemic in etiology (CAD, patient with an episode of severe chest pain couple of weeks ago, positive for amphetamines - high risk for plaque rupture) versus (more likely) nonischemic cardiomyopathy (cocaine and amphetamines, prior alcohol use). Patient is now euvolemic and hemodynamically tenuous. CT calcium score is very low risk for significant CAD (total score of 0). - In view of patient's low blood pressure, financial condition and for better compliance, will place her on affordable medications at low doses for heart failure (despite apparently good coverage with insurance at this time). - Decrease losartan 12.5 mg daily and stop spironolactone  - Cw Toprol to 25 mg daily.   - Decrease lasix to 20 daily. - Will not start Jardiance this admission due to low BP and cost of medication; will plan to consider it as outpatient (will discuss at that time). - If LVEF remains < 35% after 90 days of GDMT, will discuss referral to EP for possible device placement. In the meantime, patient will need a LifeVest (as outpatient). - Strict I's and O's every shift and standing weights if possible, low-salt diet, daily BMP with reflex to Mg (correct lytes for goals K >4.0 and Mg > 2.0) and wean supplemental oxygen to off (or down to baseline supplemental oxygen requirements) for sats greater than 92-94%. 2.  Multi-substance abuse:  I had an extensive discussion with patient explaining the risks of substance abuse especially for ACS and HF.      -Patient will be discharged to a drug rehab facility.       3. Acute liver injury:   I suspect multifactorial (hepatitis B and C infection, liver congestion from acute heart failure). Liver enzymes are now improving.    - Treatment per #1 for heart failure  - Hepatitis infection to be treated per primary team.     4. Acute pyelonephritis:   On antibiotics per primary team.    Patient may be discharged to facility (if ok with primary team) today on the above meds and follow up with me in 1 week with a BMP prior to visit. Please call me with any questions. I would like to thank you for providing me the opportunity to participate in the care of your patient. If you have any questions, please do not hesitate to contact me.      Galilea Breaux MD, [facility], [address] [place] [address] [place] [address]  Ph: [phone]  Fax: [phone]

## 2023-02-17 NOTE — CARE COORDINATION
Case Management Assessment            Discharge Note                    Date / Time of Note: 2/17/2023 9:16 AM                  Discharge Note Completed by: RAFA Edmond, KARLIEW    Patient Name: Riana Garza   YOB: 1995  Diagnosis: Septicemia ([address] [A41.9]  Acute pyelonephritis [N10]  Bilateral pleural effusion [J90]  Pyelonephritis [N12]   Date / Time: 2/3/2023  6:45 PM    Current PCP: No primary care provider on file. Clinic patient: No    Hospitalization in the last 30 days: No    Advance Directives:  Code Status: Full Code  [place] DNR form completed and on chart: Yes    Financial:  Payor: Nova Adams / Plan: Tiago Sauceda / Product Type: *No Product type* /      Pharmacy:    [id number] - [facility], [address] [place] [address] [phone] - F [phone]  [place] [id number]  Phone: [phone] Fax: [phone]      Assistance purchasing medications?: Potential Assistance Purchasing Medications: Yes  Assistance provided by Case Management: None at this time    Does patient want to participate in local refill/ meds to beds program?: Yes    Meds To Beds General Rules:  1. Can ONLY be done Monday- Friday between 8:30am-5pm  2. Prescription(s) must be in pharmacy by 3pm to be filled same day  3. Copy of patient's insurance/ prescription drug card and patient face sheet must be sent along with the prescription(s)  4. Cost of Rx cannot be added to hospital bill. If financial assistance is needed, please contact unit  or ;  or  CANNOT provide pharmacy voucher for patients co-pays  5.  Patients can then  the prescription on their way out of the hospital at discharge, or pharmacy can deliver to the bedside if staff is available. (payment due at time of pick-up or delivery - cash, check, or card accepted)     Able to afford home medications/ co-pay costs: Yes    ADLS:  Current PT AM-PAC Score:   /24  Current OT AM-PAC Score:   /24      DISCHARGE Disposition: Home- No Services Needed    LOC at discharge: Not Applicable  HAI Completed: Yes    Notification completed in HENS/PAS?:  Not Applicable    IMM Completed:   Yes, Case management has presented and reviewed IMM letter #2 to the patient and/or family/ POA. Patient and/or family/POA verbalized understanding of their medicare rights and appeal process if needed. Patient and/or family/POA has signed, initialed and placed today's date (2/17/23) and time (.) on IMM letter #2 on the the appropriate lines. Patient and/or family/POA, copy of letter offered and they are aware that this original copy of IMM letter #2 is available prior to discharge from the paper chart on the unit. Electronic documentation has been entered into epic for IMM letter #2 and original paper copy has been added to the paper chart at the nurses station. Transportation:  Transportation PLAN for discharge: family   Mode of Transport: STEARCLEAR 46 ordered at discharge: No    Home Oxygen and Respiratory Equipment:  Oxygen needed at discharge?: No    Dialysis:  Dialysis patient: No    Referrals made at [facility] for outpatient continued care:  [facility]    Additional CM Notes:  Pt will DC today. Pt now declining going to complete intake for outpatient substance abuse treatment services at [facility]. She reported that she will follow up independently. Amie Parish spoke with pt today and will continue to follow after DC. She can also connect pt with homeless/housing resources and services as well. No other needs at this time.         The Plan for Transition of Care is related to the following treatment goals of Septicemia [facility]) [A41.9]  Acute pyelonephritis [N10]  Bilateral pleural effusion [J90]  Pyelonephritis [N12]    The Patient and/or patient representative Jeanne Marroquin and her family were provided with a choice of provider and agrees with the discharge plan Yes    Freedom of choice list was provided with basic dialogue that supports the patient's individualized plan of care/goals and shares the quality data associated with the providers.  Yes    Care Transitions patient: No    RAFA Lee, [facility]  Case Management Department  Ph: [phone]  Fax: [phone]

## 2023-02-18 LAB — MISCELLANEOUS LAB TEST RESULT: NORMAL

## 2023-02-19 LAB — MISCELLANEOUS LAB TEST ORDER: NORMAL

## 2023-02-20 ENCOUNTER — FOLLOWUP TELEPHONE ENCOUNTER (OUTPATIENT)
Dept: ADMINISTRATIVE | Age: 28
End: 2023-02-20

## 2023-02-20 ENCOUNTER — FOLLOWUP TELEPHONE ENCOUNTER (OUTPATIENT)
Dept: INPATIENT UNIT | Age: 28
End: 2023-02-20

## 2023-02-20 NOTE — PROGRESS NOTES
Attempted to reach patient for 72 hour HF hospital follow up. Call was unanswered after ringing multiple times. Will try again later.

## 2023-02-20 NOTE — PROGRESS NOTES
Second attempt to reach patient for 72 hour HF hospital follow up. Call unanswered -- phone rang multiple times. VM message came on that did not ID the number being called and then cut off before message could be left. Will try again later.

## 2023-09-13 NOTE — PROGRESS NOTES
[address]  [phone]  9/20/23  Referring:   No primary care provider on file. (PCP)    REASON FOR CONSULT/CHIEF COMPLAINT/HPI     Reason for visit/ Chief complaint  Heart Failure Management   HPI Rajesh Jack is a 33 y.o. new female patient here to establish care. She was found to have severe heart failure with LVEF 10-15% in [facility] in the context of pyelonephritis and sepsis. Not long after that admission, she was admitted to , where she was found to be pregnant, and had elective termination as she was thought not to be able to survive pregnancy, and a Nexplanon was implanted. She has schizoaffective disorder and bipolar disorder, as well as a long history of substance abuse. 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 She had Pre-eclampsia with her son and thinks this is when her heart trouble started. She has 4 children. Her maternal grandmother had heart failure. Her father is not listed on her birth certificate. She had been on metoprolol and losartan, but these weren't renewed when she moved to [facility]. Today she is here today with a support person, Steffany Shetty. She has SOB with walking. She has legs swelling. She has to use extra pillows, has to lay on her side, cannot lay flat on back. She has Nexplanon implanted. She wanted her tubes tied but they couldn't do it without 30 day consent. She was smoking this morning and got really light headed. She is trying to quit. She denies syncope. All of her medications were stopped at [facility] due to not having a PCP. She is currently in a half way house.      Patient is adherent with medications and is tolerating them well without side effects     HISTORY/ALLERGIES/ROS     MedHx:  has a past medical history of Allergic rhinitis, Asthma, Bipolar 1 disorder ([address]), Bipolar affective disorder ([address]), Cigarette nicotine dependence, History of

## 2023-09-20 ENCOUNTER — OFFICE VISIT (OUTPATIENT)
Dept: CARDIOLOGY CLINIC | Age: 28
End: 2023-09-20
Payer: COMMERCIAL

## 2023-09-20 ENCOUNTER — HOSPITAL ENCOUNTER (OUTPATIENT)
Age: 28
Discharge: HOME | End: 2023-09-20
Payer: COMMERCIAL

## 2023-09-20 VITALS
WEIGHT: 187.8 LBS | OXYGEN SATURATION: 98 % | HEART RATE: 98 BPM | HEIGHT: 63 IN | SYSTOLIC BLOOD PRESSURE: 106 MMHG | DIASTOLIC BLOOD PRESSURE: 74 MMHG | BODY MASS INDEX: 33.27 KG/M2

## 2023-09-20 DIAGNOSIS — Z75.8 DOES NOT HAVE PRIMARY CARE PROVIDER: ICD-10-CM

## 2023-09-20 DIAGNOSIS — F19.91 HISTORY OF ILLICIT DRUG USE: ICD-10-CM

## 2023-09-20 DIAGNOSIS — R06.02 SOB (SHORTNESS OF BREATH): ICD-10-CM

## 2023-09-20 DIAGNOSIS — I50.20 HFREF (HEART FAILURE WITH REDUCED EJECTION FRACTION) (HCC): Primary | ICD-10-CM

## 2023-09-20 DIAGNOSIS — I50.20 HFREF (HEART FAILURE WITH REDUCED EJECTION FRACTION) (HCC): ICD-10-CM

## 2023-09-20 DIAGNOSIS — R79.89 ELEVATED BRAIN NATRIURETIC PEPTIDE (BNP) LEVEL: ICD-10-CM

## 2023-09-20 DIAGNOSIS — I10 PRIMARY HYPERTENSION: ICD-10-CM

## 2023-09-20 DIAGNOSIS — R00.0 TACHYCARDIA: ICD-10-CM

## 2023-09-20 LAB
ALBUMIN SERPL-MCNC: 4.5 G/DL (ref 3.4–5)
ALBUMIN/GLOB SERPL: 1.9 {RATIO} (ref 1.1–2.2)
ALP SERPL-CCNC: 91 U/L (ref 40–129)
ALT SERPL-CCNC: 34 U/L (ref 10–40)
ANION GAP SERPL CALCULATED.3IONS-SCNC: 8 MMOL/L (ref 3–16)
AST SERPL-CCNC: 22 U/L (ref 15–37)
BILIRUB SERPL-MCNC: <0.2 MG/DL (ref 0–1)
BUN SERPL-MCNC: 9 MG/DL (ref 7–20)
CALCIUM SERPL-MCNC: 9.2 MG/DL (ref 8.3–10.6)
CHLORIDE SERPL-SCNC: 102 MMOL/L (ref 99–110)
CO2 SERPL-SCNC: 29 MMOL/L (ref 21–32)
CREAT SERPL-MCNC: 0.7 MG/DL (ref 0.6–1.1)
GFR SERPLBLD CREATININE-BSD FMLA CKD-EPI: >60 ML/MIN/{1.73_M2}
GLUCOSE SERPL-MCNC: 88 MG/DL (ref 70–99)
NT-PROBNP SERPL-MCNC: 110 PG/ML (ref 0–124)
POTASSIUM SERPL-SCNC: 4.4 MMOL/L (ref 3.5–5.1)
PROT SERPL-MCNC: 6.9 G/DL (ref 6.4–8.2)
SODIUM SERPL-SCNC: 139 MMOL/L (ref 136–145)

## 2023-09-20 PROCEDURE — G8427 DOCREV CUR MEDS BY ELIG CLIN: HCPCS | Performed by: INTERNAL MEDICINE

## 2023-09-20 PROCEDURE — 83880 ASSAY OF NATRIURETIC PEPTIDE: CPT

## 2023-09-20 PROCEDURE — 93000 ELECTROCARDIOGRAM COMPLETE: CPT | Performed by: INTERNAL MEDICINE

## 2023-09-20 PROCEDURE — G8417 CALC BMI ABV UP PARAM F/U: HCPCS | Performed by: INTERNAL MEDICINE

## 2023-09-20 PROCEDURE — 3074F SYST BP LT 130 MM HG: CPT | Performed by: INTERNAL MEDICINE

## 2023-09-20 PROCEDURE — 99214 OFFICE O/P EST MOD 30 MIN: CPT | Performed by: INTERNAL MEDICINE

## 2023-09-20 PROCEDURE — 4004F PT TOBACCO SCREEN RCVD TLK: CPT | Performed by: INTERNAL MEDICINE

## 2023-09-20 PROCEDURE — 3078F DIAST BP <80 MM HG: CPT | Performed by: INTERNAL MEDICINE

## 2023-09-20 PROCEDURE — 36415 COLL VENOUS BLD VENIPUNCTURE: CPT

## 2023-09-20 PROCEDURE — 80053 COMPREHEN METABOLIC PANEL: CPT

## 2023-09-20 RX ORDER — BUSPIRONE HYDROCHLORIDE 10 MG/1
TABLET ORAL
COMMUNITY
Start: 2021-06-04

## 2023-09-20 RX ORDER — PALIPERIDONE PALMITATE 234 MG/1.5ML
INJECTION INTRAMUSCULAR
COMMUNITY
Start: 2023-07-11

## 2023-09-20 RX ORDER — LOSARTAN POTASSIUM 25 MG/1
12.5 TABLET ORAL DAILY
Qty: 30 TABLET | Refills: 3 | Status: SHIPPED | OUTPATIENT
Start: 2023-09-20

## 2023-09-20 RX ORDER — METOPROLOL SUCCINATE 25 MG/1
12.5 TABLET, EXTENDED RELEASE ORAL DAILY
Qty: 30 TABLET | Refills: 3 | Status: SHIPPED | OUTPATIENT
Start: 2023-09-20

## 2023-09-20 RX ORDER — TRAZODONE HYDROCHLORIDE 50 MG/1
TABLET ORAL
COMMUNITY
Start: 2023-09-08

## 2023-09-20 NOTE — PATIENT INSTRUCTIONS
Follow up with Dr Aleks More in     Echo soon     Referral to Dr Carolyn Jackson to establish a PCP     Restart Losartan 12.5mg daily  Restart Metoprolol 12.5 daily     Call for any questions or concerns.

## 2023-09-22 ENCOUNTER — HOSPITAL ENCOUNTER (OUTPATIENT)
Dept: CARDIOLOGY | Age: 28
Discharge: HOME | End: 2023-09-22
Attending: INTERNAL MEDICINE
Payer: COMMERCIAL

## 2023-09-22 ENCOUNTER — TELEPHONE (OUTPATIENT)
Dept: CARDIOLOGY CLINIC | Age: 28
End: 2023-09-22

## 2023-09-22 DIAGNOSIS — R06.02 SOB (SHORTNESS OF BREATH): ICD-10-CM

## 2023-09-22 DIAGNOSIS — I50.20 HFREF (HEART FAILURE WITH REDUCED EJECTION FRACTION) (HCC): ICD-10-CM

## 2023-09-22 PROCEDURE — 93306 TTE W/DOPPLER COMPLETE: CPT

## 2023-09-22 NOTE — TELEPHONE ENCOUNTER
----- Message from Robert Sifuentes MD sent at 9/22/2023  1:51 PM EDT -----  Neyanick Margret news! LVEF has fully recovered, BNP, liver and kidney labs are all normal.  She should continue the ARB and beta blocker we started. Doesn't need lasix anymore based on this. Follow-up as scheduled.

## 2023-09-25 NOTE — TELEPHONE ENCOUNTER
Call placed to patient who was not available to speak with pertaining to results. Lmtc for message below.

## 2023-10-19 ENCOUNTER — OFFICE VISIT (OUTPATIENT)
Age: 28
End: 2023-10-19

## 2023-10-19 VITALS
SYSTOLIC BLOOD PRESSURE: 122 MMHG | RESPIRATION RATE: 18 BRPM | DIASTOLIC BLOOD PRESSURE: 87 MMHG | OXYGEN SATURATION: 97 % | TEMPERATURE: 98.1 F | HEART RATE: 92 BPM

## 2023-10-19 DIAGNOSIS — S93.402A INVERSION SPRAIN OF LEFT ANKLE, INITIAL ENCOUNTER: Primary | ICD-10-CM

## 2023-10-19 RX ORDER — ACETAMINOPHEN 500 MG
500 TABLET ORAL 4 TIMES DAILY PRN
Qty: 60 TABLET | Refills: 0 | Status: SHIPPED | OUTPATIENT
Start: 2023-10-19

## 2024-01-24 ENCOUNTER — TELEPHONE (OUTPATIENT)
Dept: CARDIOLOGY CLINIC | Age: 29
End: 2024-01-24

## 2024-01-24 NOTE — TELEPHONE ENCOUNTER
Attempted to contact pt with results but no answer. Left message for pt to return call.      Requested information has not  been faxed yet. We can fax to a secure fax machine ( another doctors office) but not to home fax. Pt can pick the information up or we can mail it to her home address if it is not a secure fax number.           Note        ----- Message from Josh Melgar MD sent at 9/22/2023  1:51 PM EDT -----  Great news!  LVEF has fully recovered, BNP, liver and kidney labs are all normal.  She should continue the ARB and beta blocker we started.  Doesn't need lasix anymore based on this.      Follow-up as scheduled.

## 2024-01-24 NOTE — TELEPHONE ENCOUNTER
Please fax echo and last ov notes to [phone].    Pt would also like to know her echo results.    Please call her at [phone].

## 2024-01-24 NOTE — TELEPHONE ENCOUNTER
I spoke with pt. . I relayed message per VIJAY. She verbalized understanding. She said that she has been having issues with edema , so she has been taking her aunt's furosemide 40 mg daily.

## 2024-01-25 RX ORDER — FUROSEMIDE 40 MG/1
40 TABLET ORAL DAILY
Qty: 90 TABLET | Refills: 3 | Status: SHIPPED | OUTPATIENT
Start: 2024-01-25

## 2024-01-25 NOTE — TELEPHONE ENCOUNTER
Discontinued old lasix script for 20mg. Medication ordered and sent to pharm on file. Attempted to reach patient with NA/LVM

## 2024-08-26 PROBLEM — R06.02 SOB (SHORTNESS OF BREATH): Status: ACTIVE | Noted: 2024-08-26

## 2024-08-26 NOTE — PROGRESS NOTES
[facility]      Follow up   [phone]  9/3/24  Referring:       REASON FOR CONSULT/CHIEF COMPLAINT/HPI     Reason for visit/ Chief complaint  Heart Failure Management   HPI Barbie Carroll is a 29 y.o. female patient here for follow up.     She was found to have severe heart failure with LVEF 10-15% in 2023 at [facility], in the context of pyelonephritis and sepsis.  Not long after that admission, she was admitted to , where she was found to be pregnant, and had elective termination as she was thought not to be able to survive pregnancy, and a Nexplanon was implanted.    She has schizoaffective disorder and bipolar disorder, as well as a long history of substance abuse.  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  She is a smoker.     She had Pre-eclampsia with her son and thinks this is when her heart trouble started. She has 4 children. Her maternal grandmother had heart failure. Her father is not listed on her birth certificate.     Today she feels well.  She is back in intensive outpatient rehab.  She had a relapse and briefly used meth around [place].  She has been clean since then.      She has no chest pain, shortness of breath, palpitations, dizziness, syncope, orthopnea, or PND.    She is now getting monthly IM shots for her bipolar disorder, which are very effective.  No recent manic episodes.  She was recently diagnosed with hepatitis C and is on antiviral tx for this.    Patient is adherent with medications and is tolerating them well without side effects     HISTORY/ALLERGIES/ROS     MedHx:  has a past medical history of Allergic rhinitis, Asthma, Bipolar 1 disorder (HCC), Bipolar affective disorder (HCC), Cigarette nicotine dependence, History of amphetamine dependence/abuse (HCC), Hypertension, and Systolic CHF (HCC).  SurgHx:  has a past surgical history that includes  section.   SocHx:  reports that she has been smoking cigarettes. She has a 5

## 2024-09-03 ENCOUNTER — OFFICE VISIT (OUTPATIENT)
Dept: CARDIOLOGY CLINIC | Age: 29
End: 2024-09-03
Payer: COMMERCIAL

## 2024-09-03 VITALS
SYSTOLIC BLOOD PRESSURE: 92 MMHG | DIASTOLIC BLOOD PRESSURE: 52 MMHG | WEIGHT: 218.2 LBS | HEIGHT: 69 IN | HEART RATE: 91 BPM | BODY MASS INDEX: 32.32 KG/M2 | OXYGEN SATURATION: 98 %

## 2024-09-03 DIAGNOSIS — F19.91 HISTORY OF ILLICIT DRUG USE: ICD-10-CM

## 2024-09-03 DIAGNOSIS — I50.20 HFREF (HEART FAILURE WITH REDUCED EJECTION FRACTION) (HCC): Primary | ICD-10-CM

## 2024-09-03 DIAGNOSIS — I10 PRIMARY HYPERTENSION: ICD-10-CM

## 2024-09-03 DIAGNOSIS — R00.0 TACHYCARDIA: ICD-10-CM

## 2024-09-03 PROCEDURE — 99214 OFFICE O/P EST MOD 30 MIN: CPT | Performed by: INTERNAL MEDICINE

## 2024-09-03 PROCEDURE — 4004F PT TOBACCO SCREEN RCVD TLK: CPT | Performed by: INTERNAL MEDICINE

## 2024-09-03 PROCEDURE — 3074F SYST BP LT 130 MM HG: CPT | Performed by: INTERNAL MEDICINE

## 2024-09-03 PROCEDURE — 93000 ELECTROCARDIOGRAM COMPLETE: CPT | Performed by: INTERNAL MEDICINE

## 2024-09-03 PROCEDURE — G8417 CALC BMI ABV UP PARAM F/U: HCPCS | Performed by: INTERNAL MEDICINE

## 2024-09-03 PROCEDURE — G8427 DOCREV CUR MEDS BY ELIG CLIN: HCPCS | Performed by: INTERNAL MEDICINE

## 2024-09-03 PROCEDURE — 3078F DIAST BP <80 MM HG: CPT | Performed by: INTERNAL MEDICINE

## 2024-09-03 RX ORDER — METOPROLOL TARTRATE 25 MG/1
25 TABLET, FILM COATED ORAL 2 TIMES DAILY
COMMUNITY
Start: 2024-08-19

## 2024-09-03 RX ORDER — NALTREXONE 380 MG
380 KIT INTRAMUSCULAR
COMMUNITY
Start: 2024-08-06

## 2024-09-03 RX ORDER — GLECAPREVIR AND PIBRENTASVIR 40; 100 MG/1; MG/1
3 TABLET, FILM COATED ORAL DAILY
COMMUNITY
Start: 2024-08-13 | End: 2024-10-08

## 2024-09-03 NOTE — PATIENT INSTRUCTIONS
Your heart is back to normal on your last echo!  Continue metoprolol  Stop losartan    Follow up in 6 months, sooner if needed    Call with any questions